# Patient Record
Sex: FEMALE | Race: OTHER | Employment: UNEMPLOYED | URBAN - METROPOLITAN AREA
[De-identification: names, ages, dates, MRNs, and addresses within clinical notes are randomized per-mention and may not be internally consistent; named-entity substitution may affect disease eponyms.]

---

## 2019-06-13 ENCOUNTER — OFFICE VISIT (OUTPATIENT)
Dept: FAMILY MEDICINE CLINIC | Age: 59
End: 2019-06-13

## 2019-06-13 VITALS
BODY MASS INDEX: 25.1 KG/M2 | HEIGHT: 64 IN | RESPIRATION RATE: 18 BRPM | TEMPERATURE: 98.8 F | SYSTOLIC BLOOD PRESSURE: 114 MMHG | HEART RATE: 74 BPM | WEIGHT: 147 LBS | DIASTOLIC BLOOD PRESSURE: 76 MMHG

## 2019-06-13 DIAGNOSIS — I10 ESSENTIAL HYPERTENSION: ICD-10-CM

## 2019-06-13 DIAGNOSIS — R05.9 COUGH: Primary | ICD-10-CM

## 2019-06-13 RX ORDER — VALSARTAN 80 MG/1
80 TABLET ORAL
COMMUNITY
Start: 2019-05-22 | End: 2019-06-27 | Stop reason: ALTCHOICE

## 2019-06-13 RX ORDER — LORATADINE 10 MG/1
10 TABLET ORAL DAILY
COMMUNITY
End: 2019-06-27

## 2019-06-13 RX ORDER — BENZONATATE 200 MG/1
200 CAPSULE ORAL
Qty: 30 CAP | Refills: 0 | Status: SHIPPED | OUTPATIENT
Start: 2019-06-13 | End: 2019-06-27

## 2019-06-13 NOTE — PROGRESS NOTES
Chief Complaint   Patient presents with    Establish Care    Cough     at night x 4 days      1. Have you been to the ER, urgent care clinic since your last visit? Hospitalized since your last visit? No     2. Have you seen or consulted any other health care providers outside of the [address] [place] [address] since your last visit? Include any pap smears or colon screening.  No

## 2019-06-13 NOTE — PROGRESS NOTES
HISTORY OF PRESENT ILLNESS  Marcio Cramer is a 60 y.o. female. Marcio Cramer is here to establish care. She is concerned about a nightly cough for the past 4 days. It is getting worse. The cough is nonproductive and she has no other symptoms other than occasional itching in the back of her throat. \"It is just dry. \"  She has not had this problem before. Nothing in particular makes it better or worse. It is keeping her up. Review of Systems   Constitutional: Negative for chills, fever and malaise/fatigue. HENT: Negative for congestion, ear pain and sore throat. Eyes: Negative for discharge and redness. Respiratory: Negative for cough, sputum production, shortness of breath and wheezing. Cardiovascular: Negative for chest pain. Neurological: Negative for headaches. Visit Vitals  /76   Pulse 74   Temp 98.8 °F (37.1 °C) (Oral)   Resp 18   Ht 5' 4.17\" (1.63 m)   Wt 147 lb (66.7 kg)   BMI 25.10 kg/m²     Physical Exam   Constitutional: She is oriented to person, place, and time. She appears well-developed and well-nourished. No distress. HENT:   Head: Normocephalic. Right Ear: Tympanic membrane, external ear and ear canal normal.   Left Ear: Tympanic membrane, external ear and ear canal normal.   Nose: Nose normal. Right sinus exhibits no maxillary sinus tenderness and no frontal sinus tenderness. Left sinus exhibits no maxillary sinus tenderness and no frontal sinus tenderness. Mouth/Throat: Uvula is midline, oropharynx is clear and moist and mucous membranes are normal.   Eyes: Right eye exhibits no discharge. Left eye exhibits no discharge. Right conjunctiva is not injected. Left conjunctiva is not injected. Cardiovascular: Normal rate, regular rhythm and normal heart sounds. Exam reveals no gallop and no friction rub. No murmur heard. Pulmonary/Chest: Effort normal and breath sounds normal. No respiratory distress. She has no wheezes. She has no rales.    Lymphadenopathy:     She has no cervical adenopathy. Neurological: She is alert and oriented to person, place, and time. Skin: Skin is warm and dry. She is not diaphoretic. Nursing note and vitals reviewed. ASSESSMENT and PLAN    ICD-10-CM ICD-9-CM    1. Cough R05 786.2 DISCONTINUED: loratadine (CLARITIN) 10 mg tablet      DISCONTINUED: benzonatate (TESSALON) 200 mg capsule   2. Essential hypertension J32 409.0 METABOLIC PANEL, BASIC      DISCONTINUED: benzonatate (TESSALON) 200 mg capsule        Cough, possibly allergy related  Blood pressure controlled  Claritin  Tessalon prn    Follow-up and Dispositions    · Return in about 6 months (around 12/13/2019) for blood pressure, if cough not better in 3-4 weeks. Reviewed plan of care. Patient has provided input and agrees with goals.

## 2019-06-14 LAB
BUN SERPL-MCNC: 19 MG/DL (ref 6–24)
BUN/CREAT SERPL: 29 (ref 9–23)
CALCIUM SERPL-MCNC: 9.3 MG/DL (ref 8.7–10.2)
CHLORIDE SERPL-SCNC: 103 MMOL/L (ref 96–106)
CO2 SERPL-SCNC: 24 MMOL/L (ref 20–29)
CREAT SERPL-MCNC: 0.65 MG/DL (ref 0.57–1)
GLUCOSE SERPL-MCNC: 97 MG/DL (ref 65–99)
POTASSIUM SERPL-SCNC: 4 MMOL/L (ref 3.5–5.2)
SODIUM SERPL-SCNC: 142 MMOL/L (ref 134–144)

## 2019-06-21 ENCOUNTER — OFFICE VISIT (OUTPATIENT)
Dept: FAMILY MEDICINE CLINIC | Age: 59
End: 2019-06-21

## 2019-06-21 VITALS
HEART RATE: 82 BPM | DIASTOLIC BLOOD PRESSURE: 70 MMHG | BODY MASS INDEX: 24.89 KG/M2 | TEMPERATURE: 98.7 F | RESPIRATION RATE: 16 BRPM | SYSTOLIC BLOOD PRESSURE: 107 MMHG | OXYGEN SATURATION: 100 % | WEIGHT: 145.8 LBS | HEIGHT: 64 IN

## 2019-06-21 DIAGNOSIS — M25.561 RIGHT KNEE PAIN, UNSPECIFIED CHRONICITY: ICD-10-CM

## 2019-06-21 DIAGNOSIS — M25.571 RIGHT ANKLE PAIN, UNSPECIFIED CHRONICITY: ICD-10-CM

## 2019-06-21 DIAGNOSIS — J01.90 ACUTE NON-RECURRENT SINUSITIS, UNSPECIFIED LOCATION: Primary | ICD-10-CM

## 2019-06-21 RX ORDER — AMOXICILLIN AND CLAVULANATE POTASSIUM 875; 125 MG/1; MG/1
1 TABLET, FILM COATED ORAL 2 TIMES DAILY
Qty: 20 TAB | Refills: 0 | Status: SHIPPED | OUTPATIENT
Start: 2019-06-21 | End: 2019-07-01

## 2019-06-21 NOTE — PATIENT INSTRUCTIONS
Although symptoms may be viral at this point it seems reasonable from a time course perspective and also from the patient's symptomatology to treat as though this is a sinus infection. Medications prescribed, discussed risks, benefits, alternatives and judicious use of antibiotic medications  Supportive care at home discussed as well  Patient has ongoing physical therapy needs and she cannot afford to continue to be seen at [address] [place] [address] for her physical therapy because of insurance coverage.   I will make a referral to physical therapy to be seen at a [facility] facility hopefully this will be more appropriate from an insurance coverage standpoint and she will be able to afford to continue with the therapies that she needs for her recovery in the postsurgical.  Call with questions or concerns

## 2019-06-21 NOTE — PROGRESS NOTES
Family Medicine Acute Visit Progress Note  Patient: Selina Roberts  1960, 60 y.o., female  Encounter Date: 6/21/2019    ASSESSMENT & PLAN    ICD-10-CM ICD-9-CM    1. Acute non-recurrent sinusitis, unspecified location J01.90 461.9    2. Right ankle pain, unspecified chronicity M25.571 719.47 REFERRAL TO PHYSICAL THERAPY   3. Right knee pain, unspecified chronicity M25.561 719.46 REFERRAL TO PHYSICAL THERAPY       Orders Placed This Encounter   [address] [facility] [place]     Referral Priority:   Routine     Referral Type:   PT/OT/ST     Referral Reason:   Specialty Services Required     Requested Specialty:   Physical Therapy     Number of Visits Requested:   1    amoxicillin-clavulanate (AUGMENTIN) 875-125 mg per tablet     Sig: Take 1 Tab by mouth two (2) times a day for 10 days. WITH FOOD     Dispense:  20 Tab     Refill:  0       Patient Instructions   Although symptoms may be viral at this point it seems reasonable from a time course perspective and also from the patient's symptomatology to treat as though this is a sinus infection. Medications prescribed, discussed risks, benefits, alternatives and judicious use of antibiotic medications  Supportive care at home discussed as well  Patient has ongoing physical therapy needs and she cannot afford to continue to be seen at [address] [place] [address] for her physical therapy because of insurance coverage. I will make a referral to physical therapy to be seen at a [facility] facility hopefully this will be more appropriate from an insurance coverage standpoint and she will be able to afford to continue with the therapies that she needs for her recovery in the postsurgical.  Call with questions or concerns      [address] [facility]  Chief Complaint   Patient presents with    Cold Symptoms     x 2 weeks    Cough     Yellow phlem started today.        Reggie De Leon is a 60 y.o. female presenting today for more than 2 weeks of symptoms of cough, ear fullness, facial fullness, purulent drainage, sinus congestion and pain. She reports she was seen a few weeks ago, her cough was dry and the symptoms were just beginning to start and she was given benzonatate. This has not seemed to help and symptoms have progressively worsened over these last 2 weeks. She denies any fevers at home. She is tried some over-the-counter medications, increasing her fluid consumption and increasing her rest but has not had significant improvement. Also she mentions that she has been following with [address] [place] [address] for post surgical care for her right knee and ankle. She unfortunately does not have insurance coverage for the physical therapy done at [address] [place] [address] and recently received quite a large bill from there. She wonders if I could make a referral for her to be seen for physical therapy had a [facility] location so that she might have insurance cover these treatments. [facility] notes reviewed in chart    Review of Systems  A 12 point review of systems was negative except as noted here or in the HPI. OBJECTIVE  Visit Vitals  /70 (BP 1 Location: Left arm, BP Patient Position: Sitting)   Pulse 82   Temp 98.7 °F (37.1 °C) (Oral)   Resp 16   Ht 5' 4.17\" (1.63 m)   Wt 145 lb 12.8 oz (66.1 kg)   SpO2 100%   BMI 24.89 kg/m²       Physical Exam   Constitutional: She is oriented to person, place, and time. She appears well-developed and well-nourished. No distress. Nad, appears stated age, non toxic   HENT:   Head: Normocephalic and atraumatic. Throat clear but erythematous, no exudates or concretions, nares boggy and edematous, tenderness to palpation of frontal sinuses, bilateral erythema of TM with moderate bulging   Eyes: Pupils are equal, round, and reactive to light. Conjunctivae and EOM are normal. Right eye exhibits no discharge. Left eye exhibits no discharge. No scleral icterus. Neck: Normal range of motion. Neck supple.    Cardiovascular: Normal rate, regular rhythm and normal heart sounds. Exam reveals no gallop and no friction rub. No murmur heard. Pulmonary/Chest: Effort normal and breath sounds normal. No stridor. No respiratory distress. She has no wheezes. She has no rales. Abdominal: Soft. Bowel sounds are normal. She exhibits no distension. There is no tenderness. Musculoskeletal: She exhibits no edema. postsurgical scars on right knee and ankle noted   Lymphadenopathy:     She has cervical adenopathy. Neurological: She is alert and oriented to person, place, and time. Skin: Skin is warm and dry. No rash noted. She is not diaphoretic. Psychiatric: She has a normal mood and affect. Her behavior is normal.   Nursing note and vitals reviewed. No results found for any visits on 06/21/19. HISTORICAL  PMH, PSH, FHX, SOCHX, ALLERGIES and MES were reviewed and updated today. Addis Mcdermott MD  [facility]  06/21/19 11:19 AM    Portions of this note may have been populated using smart dictation software and may have \"sounds-like\" errors present. Pt was counseled on risks, benefits and alternatives of treatment options. All questions were asked and answered and the patient was agreeable with the treatment plan as outlined.

## 2019-06-21 NOTE — PROGRESS NOTES
Chief Complaint   Patient presents with    Cold Symptoms     x 2 weeks    Cough     Yellow phlem started today. 1. Have you been to the ER, urgent care clinic since your last visit? Hospitalized since your last visit? No    2. Have you seen or consulted any other health care providers outside of the [address] [place] [address] since your last visit? Include any pap smears or colon screening.  No

## 2019-06-27 ENCOUNTER — OFFICE VISIT (OUTPATIENT)
Dept: FAMILY MEDICINE CLINIC | Age: 59
End: 2019-06-27

## 2019-06-27 ENCOUNTER — HOSPITAL ENCOUNTER (OUTPATIENT)
Dept: GENERAL RADIOLOGY | Age: 59
Discharge: HOME | End: 2019-06-27
Payer: COMMERCIAL

## 2019-06-27 VITALS
SYSTOLIC BLOOD PRESSURE: 123 MMHG | BODY MASS INDEX: 25.1 KG/M2 | HEART RATE: 70 BPM | DIASTOLIC BLOOD PRESSURE: 74 MMHG | HEIGHT: 64 IN | WEIGHT: 147 LBS | RESPIRATION RATE: 18 BRPM | TEMPERATURE: 98.4 F

## 2019-06-27 DIAGNOSIS — N63.20 LEFT BREAST MASS: ICD-10-CM

## 2019-06-27 DIAGNOSIS — I10 ESSENTIAL HYPERTENSION: ICD-10-CM

## 2019-06-27 DIAGNOSIS — R05.9 COUGH: Primary | ICD-10-CM

## 2019-06-27 DIAGNOSIS — R05.9 COUGH: ICD-10-CM

## 2019-06-27 PROCEDURE — 71046 X-RAY EXAM CHEST 2 VIEWS: CPT

## 2019-06-27 RX ORDER — AMLODIPINE BESYLATE 5 MG/1
5 TABLET ORAL DAILY
Qty: 30 TAB | Refills: 1 | Status: SHIPPED | OUTPATIENT
Start: 2019-06-27 | End: 2019-07-18 | Stop reason: SDUPTHER

## 2019-06-27 NOTE — PROGRESS NOTES
Chief Complaint   Patient presents with    Breast Mass     next to left nipple   Pt states she is still coughing. 1. Have you been to the ER, urgent care clinic since your last visit? Hospitalized since your last visit? No     2. Have you seen or consulted any other health care providers outside of the [address] [place] [address] since your last visit? Include any pap smears or colon screening.  No

## 2019-06-27 NOTE — PROGRESS NOTES
HISTORY OF PRESENT ILLNESS  Patrick Jones is a 60 y.o. female. Patrick Jones is here due to a lump in her left breast 2 days ago. Also, she has black spots around the nipple. It is not painful. Her last mammogram was 4 years ago. Also, I saw her for a cough, thought to be allergy related, on the 13th. It is still present and is unchanged. No relief with Tessalon and Claritin. Nothing makes it better or worse. Currently, she is taking Valsartan for her HTN. Review of Systems   HENT: Negative for congestion. No postnasal drainage. Occasional throat itching. Eyes: Negative for discharge and redness. Respiratory: Positive for cough. Negative for shortness of breath and wheezing. Genitourinary:        Left breast mass       Visit Vitals  /74   Pulse 70   Temp 98.4 °F (36.9 °C) (Oral)   Resp 18   Ht 5' 4.17\" (1.63 m)   Wt 147 lb (66.7 kg)   BMI 25.10 kg/m²     Physical Exam   Constitutional: She is oriented to person, place, and time. She appears well-developed and well-nourished. No distress. HENT:   Head: Normocephalic. Right Ear: Tympanic membrane, external ear and ear canal normal.   Left Ear: Tympanic membrane, external ear and ear canal normal.   Nose: Nose normal. Right sinus exhibits no maxillary sinus tenderness and no frontal sinus tenderness. Left sinus exhibits no maxillary sinus tenderness and no frontal sinus tenderness. Mouth/Throat: Uvula is midline, oropharynx is clear and moist and mucous membranes are normal.   Eyes: Right eye exhibits no discharge. Left eye exhibits no discharge. Right conjunctiva is not injected. Left conjunctiva is not injected. Cardiovascular: Normal rate, regular rhythm and normal heart sounds. Exam reveals no gallop and no friction rub. No murmur heard. Pulmonary/Chest: Effort normal and breath sounds normal. No respiratory distress. She has no wheezes. She has no rales. No breast swelling, tenderness or discharge.        Lymphadenopathy: She has no cervical adenopathy. Neurological: She is alert and oriented to person, place, and time. Skin: Skin is warm and dry. She is not diaphoretic. No breast skin or nipple abnormailities   Nursing note and vitals reviewed. ASSESSMENT and PLAN    ICD-10-CM ICD-9-CM    1. Cough R05 786.2 XR CHEST PA LAT   2. Left breast mass N63.20 611.72 XR CHEST PA LAT      ANALI MAMMOGRAM DIAG BILAT SAME DAY INCL CAD      REFERRAL TO BREAST SURGERY   3. Essential hypertension I10 401.9 amLODIPine (NORVASC) 5 mg tablet        Cough may be related to her valsartan use  Diagnostic mammogram today  Referral to breast surgery  chest X-ray  Stop valsartan  Start Norvasc    Follow-up and Dispositions    · Return in about 1 month (around 7/27/2019) for blood pressure. Reviewed plan of care. Patient has provided input and agrees with goals.

## 2019-07-07 ENCOUNTER — TELEPHONE (OUTPATIENT)
Dept: FAMILY MEDICINE CLINIC | Age: 59
End: 2019-07-07

## 2019-07-08 ENCOUNTER — HOSPITAL ENCOUNTER (OUTPATIENT)
Dept: PHYSICAL THERAPY | Age: 59
Discharge: HOME | End: 2019-07-08
Payer: COMMERCIAL

## 2019-07-08 PROCEDURE — 97161 PT EVAL LOW COMPLEX 20 MIN: CPT | Performed by: PHYSICAL THERAPIST

## 2019-07-08 PROCEDURE — 97110 THERAPEUTIC EXERCISES: CPT | Performed by: PHYSICAL THERAPIST

## 2019-07-08 NOTE — PROGRESS NOTES
[facility]  [facility]  [facility]  Phone: [phone]  Fax: [phone]    Plan of Care/ Statement of Necessity for Physical Therapy Services 2-15    Patient name: Vern Bernardo  : 1960  Provider#: [id number]  Referral source: Nina Odom MD      Medical/Treatment Diagnosis: Right knee pain [M25.561]  Right ankle pain [M25.571]     Prior Hospitalization: see medical history     Comorbidities: None  Prior Level of Function: see initial eval  Medications: Verified on Patient Summary List    Start of Care: 18      Onset Date: R knee: 3/2016           R ankle: 2018       The Plan of Care and following information is based on the information from the initial evaluation. Assessment/ key information: Patient presents with R knee OA with a previous surgery several years ago as well as a fracture of the R ankle one year ago. She continues to have ROM and strength limitations at both joints that is limiting her ability to walk for prolonged periods. Evaluation Complexity History MEDIUM  Complexity : 1-2 comorbidities / personal factors will impact the outcome/ POC ; Examination MEDIUM Complexity : 3 Standardized tests and measures addressing body structure, function, activity limitation and / or participation in recreation  ;Presentation MEDIUM Complexity : Evolving with changing characteristics  ; Clinical Decision Making MEDIUM Complexity : FOTO score of 26-74  Overall Complexity Rating: MEDIUM    Problem List: pain affecting function, decrease ROM, decrease strength, impaired gait/ balance, decrease ADL/ functional abilitiies, decrease activity tolerance, decrease flexibility/ joint mobility and decrease transfer abilities   Treatment Plan may include any combination of the following: Therapeutic exercise, Therapeutic activities, Neuromuscular re-education, Physical agent/modality, Gait/balance training, Manual therapy, Patient education, Self Care training, Functional mobility training, Home safety training and Stair training  Patient / Family readiness to learn indicated by: asking questions, trying to perform skills and interest  Persons(s) to be included in education: patient (P)  Barriers to Learning/Limitations: None  Patient Goal (s): I want to be able to walk with less pain.   Patient Self Reported Health Status: excellent  Rehabilitation Potential: excellent    Short Term Goals: To be accomplished in 8 treatments:  1. Patient will be able to walk for 20 minutes with no pain or limitation. 2. Patient will be able to perform a sit-to-stand transfer with no pain or limitation. 3. Patient will be able to descend a curb with her R LE with no pain or limitation. Long Term Goals: To be accomplished in 16 treatments:  1. Patient will be able to walk for 30 minutes with no pain or limitation. 2. Patient will be able to squat through a full ROM with no pain or limitation. 3. Patient will be able to ambulate a flight of stairs with no pain or limitation. Frequency / Duration: Patient to be seen 2 times per week for 8 weeks. Patient/ Caregiver education and instruction: self care, activity modification and exercises    [x]  Plan of care has been reviewed with PTA    Eusebio Oliver, PT , DPT, OCS, Cert. DN   7/8/2019     ________________________________________________________________________    I certify that the above Therapy Services are being furnished while the patient is under my care. I agree with the treatment plan and certify that this therapy is necessary.     [id number] Signature:____________________  Date:____________Time: _________

## 2019-07-10 ENCOUNTER — HOSPITAL ENCOUNTER (OUTPATIENT)
Dept: MAMMOGRAPHY | Age: 59
Discharge: HOME | End: 2019-07-10
Attending: FAMILY MEDICINE
Payer: COMMERCIAL

## 2019-07-10 ENCOUNTER — OFFICE VISIT (OUTPATIENT)
Dept: SURGERY | Age: 59
End: 2019-07-10

## 2019-07-10 VITALS
WEIGHT: 147 LBS | SYSTOLIC BLOOD PRESSURE: 117 MMHG | HEIGHT: 64 IN | DIASTOLIC BLOOD PRESSURE: 64 MMHG | HEART RATE: 83 BPM | BODY MASS INDEX: 25.1 KG/M2

## 2019-07-10 DIAGNOSIS — N63.20 MASS OF LEFT BREAST: ICD-10-CM

## 2019-07-10 DIAGNOSIS — N60.12 FIBROCYSTIC BREAST, LEFT: Primary | ICD-10-CM

## 2019-07-10 PROCEDURE — 77066 DX MAMMO INCL CAD BI: CPT

## 2019-07-10 PROCEDURE — 76642 ULTRASOUND BREAST LIMITED: CPT

## 2019-07-10 NOTE — PROGRESS NOTES
HISTORY OF PRESENT ILLNESS Miesha Butt is a 60 y.o. female. HPI NEW patient referral for consultation by Dr. Philip Meyers for a palpable LEFT breast lump. The patient had breast imaging done today. She denies any nipple inversion or discharge. OB History None Obstetric Comments Menarche  LMP unsure, # of children 2, age of 1st delivery 31 Hysterectomy/oophorectomy no/no, Breast bx none  history of breast feeding no, BCP yes, Hormone therapy none ANALI Results (most recent): 
Results from Hospital Encounter encounter on 07/10/19 [facility] MAMMO BI DX INCL CAD Narrative STUDY:  Bilateral Diagnostic Digital Mammography with left breast ultrasound INDICATION: Palpable abnormality in the left breast 
 
COMPARISON:  New baseline study BREAST COMPOSITION: The breasts are extremely dense, which lowers the 
sensitivity of mammography. FINDINGS: Bilateral digital diagnostic mammography was performed, and is 
interpreted in conjunction with a computer assisted detection (CAD) system. Spot 
compression views were performed of the region of palpable concern, the 
periareolar left breast at approximately 2:00. There are no suspicious findings 
within either breast. There is no skin thickening or nipple retraction. Left breast ultrasound was performed of the region of palpable concern, the 
lateral aspect of the areola at approximately 2:00. There is a 4 mm Hardy 
gland cyst, which accounts for the region of palpable concern. There are no 
suspicious lesions. Impression IMPRESSION: 
 
1. BI-RADS Assessment Category 2: Benign finding. 4 mm Hardy gland cyst in 
the lateral aspect of the left areola, explaining the region of palpable 
concern. 2. No mammographic evidence of malignancy within either breast. 
3. Left breast ultrasound confirms these benign findings. Recommend annual screening mammography. The patient has been notified of these results and recommendations. No family history of breast or ovarian cancer Review of Systems Constitutional: Negative. HENT: Negative. Eyes: Negative. Respiratory: Negative. Cardiovascular: Negative. Gastrointestinal: Negative. Genitourinary: Negative. Musculoskeletal: Negative. Skin: Negative. Neurological: Negative. Endo/Heme/Allergies: Negative. Psychiatric/Behavioral: Negative. Physical Exam 
 
ASSESSMENT and PLAN 
{ASSESSMENT/PLAN:73700}

## 2019-07-10 NOTE — PROGRESS NOTES
HISTORY OF PRESENT ILLNESS  Zari Callaway is a 60 y.o. female. HPI  NEW patient referral for consultation by Dr. Yaakov Ceron for a palpable LEFT breast lump. The patient had breast imaging done today. She denies any nipple inversion or discharge. OB History    None       Obstetric Comments   Menarche  LMP unsure, # of children 2, age of 1st delivery 31 Hysterectomy/oophorectomy no/no, Breast bx none  history of breast feeding no, BCP yes, Hormone therapy none             [facility] Results (most recent):       Results from [place] encounter on 07/10/19   [facility] MAMMO BI DX INCL CAD     Narrative STUDY:  Bilateral Diagnostic Digital Mammography with left breast ultrasound     INDICATION: Palpable abnormality in the left breast     COMPARISON:  New baseline study     BREAST COMPOSITION: The breasts are extremely dense, which lowers the  sensitivity of mammography.     FINDINGS: Bilateral digital diagnostic mammography was performed, and is  interpreted in conjunction with a computer assisted detection (CAD) system. Spot  compression views were performed of the region of palpable concern, the  periareolar left breast at approximately 2:00. There are no suspicious findings  within either breast. There is no skin thickening or nipple retraction.     Left breast ultrasound was performed of the region of palpable concern, the  lateral aspect of the areola at approximately 2:00. There is a 4 mm Hardy  gland cyst, which accounts for the region of palpable concern. There are no  suspicious lesions.        Impression IMPRESSION:     1. BI-RADS Assessment Category 2: Benign finding. 4 mm Hardy gland cyst in  the lateral aspect of the left areola, explaining the region of palpable  concern.   2. No mammographic evidence of malignancy within either breast.  3. Left breast ultrasound confirms these benign findings.     Recommend annual screening mammography.     The patient has been notified of these results and recommendations. No family history of breast or ovarian cancer       Past Medical History:   Diagnosis Date    Arthritis     bilateral knees    Hypercholesterolemia     Hypertension        Past Surgical History:   Procedure Laterality Date    HX ANKLE FRACTURE TX Right     HX KNEE REPLACEMENT Right        Social History     Socioeconomic History    Marital status: SINGLE     Spouse name: Not on file    Number of children: Not on file    Years of education: Not on file    Highest education level: Not on file   Occupational History    Not on file   Social Needs    Financial resource strain: Not on file    Food insecurity:     Worry: Not on file     Inability: Not on file    Transportation needs:     Medical: Not on file     Non-medical: Not on file   Tobacco Use    Smoking status: Never Smoker    Smokeless tobacco: Never Used   Substance and Sexual Activity    Alcohol use: Never     Frequency: Never    Drug use: Never    Sexual activity: Yes     Birth control/protection: None   Lifestyle    Physical activity:     Days per week: Not on file     Minutes per session: Not on file    Stress: Not on file   Relationships    Social connections:     Talks on phone: Not on file     Gets together: Not on file     Attends [employer] service: Not on file     Active member of club or organization: Not on file     Attends meetings of clubs or organizations: Not on file     Relationship status: Not on file    Intimate partner violence:     Fear of current or ex partner: Not on file     Emotionally abused: Not on file     Physically abused: Not on file     Forced sexual activity: Not on file   Other Topics Concern    Not on file   Social History Narrative    Not on file       Current Outpatient Medications on File Prior to Visit   Medication Sig Dispense Refill    amLODIPine (NORVASC) 5 mg tablet Take 1 Tab by mouth daily. 30 Tab 1     No current facility-administered medications on file prior to visit. No Known Allergies    OB History    None      Obstetric Comments   Menarche  LMP unsure, # of children 2, age of 1st delivery 31 Hysterectomy/oophorectomy no/no, Breast bx none  history of breast feeding no, BCP yes, Hormone therapy none             ROS  Constitutional: Negative. HENT: Negative. Eyes: Negative. Respiratory: Negative. Cardiovascular: Negative. Gastrointestinal: Negative. Genitourinary: Negative. Musculoskeletal: Negative. Skin: Negative. Neurological: Negative. Endo/Heme/Allergies: Negative. Psychiatric/Behavioral: Negative. Physical Exam   Cardiovascular: Normal rate, regular rhythm and normal heart sounds. Pulmonary/Chest: Breath sounds normal. Right breast exhibits no inverted nipple, no mass, no nipple discharge, no skin change and no tenderness. Left breast exhibits no inverted nipple, no mass, no nipple discharge, no skin change and no tenderness. Breasts are symmetrical.       Lymphadenopathy:     She has no cervical adenopathy. Right cervical: No superficial cervical, no deep cervical and no posterior cervical adenopathy present. Left cervical: No superficial cervical, no deep cervical and no posterior cervical adenopathy present. She has no axillary adenopathy. Right axillary: No pectoral and no lateral adenopathy present. Left axillary: No pectoral and no lateral adenopathy present. ASSESSMENT and PLAN    ICD-10-CM ICD-9-CM    1. Fibrocystic breast, left N60.12 610.1       New patient presents for evaluation of LEFT breast mass, and is doing well overall. Palpable arthur gland at 4:00 retroareolar. Reviewed mammogram results. Pt to continue annual mammography. F/U PRN. This plan was reviewed with the patient and patient agrees. All questions were answered.     Written by Justin Montana, as dictated by Dr. Fabiana Coreas MD.

## 2019-07-15 ENCOUNTER — HOSPITAL ENCOUNTER (OUTPATIENT)
Dept: PHYSICAL THERAPY | Age: 59
Discharge: HOME | End: 2019-07-15
Payer: COMMERCIAL

## 2019-07-15 PROCEDURE — 97110 THERAPEUTIC EXERCISES: CPT | Performed by: PHYSICAL THERAPIST

## 2019-07-15 NOTE — PROGRESS NOTES
PT DAILY TREATMENT NOTE 2-15    Patient Name: Leah Monte  Date:7/15/2019  : 1960  [x]  Patient  Verified  Payor: [address] [place] [address] Road / Plan: [address] / Product Type: Managed Care Medicaid /    In time:7:30 AM  Out time:8:25 AM  Total Treatment Time (min): 55  Visit #:  2    Treatment Area: Right knee pain [M25.561]  Right ankle pain [M25.571]    SUBJECTIVE  Pain Level (0-10 scale): 0/10  Any medication changes, allergies to medications, adverse drug reactions, diagnosis change, or new procedure performed?: [x] No    [] Yes (see summary sheet for update)  Subjective functional status/changes:   [] No changes reported  Patient reports no significant pain at this moment and was able to perform her HEP this past weekend.     OBJECTIVE    Modality rationale: Patient declined   Min Type Additional Details       [] Estim: []Att   []Unatt    []TENS instruct                  []IFC  []Premod   []NMES                     []Other:  []w/US   []w/ice   []w/heat  Position:  Location:       []  Traction: [] Cervical       []Lumbar                       [] Prone          []Supine                       []Intermittent   []Continuous Lbs:  [] before manual  [] after manual  []w/heat    []  Ultrasound: []Continuous   [] Pulsed                       at: []1MHz   []3MHz Location:  W/cm2:    [] Paraffin         Location:   []w/heat    []  Ice     []  Heat  []  Ice massage Position:  Location:    []  Laser  []  Other: Position:  Location:      []  Vasopneumatic Device Pressure:       [] lo [] med [] hi   Temperature:      [x] Skin assessment post-treatment:  [x]intact []redness- no adverse reaction    []redness - adverse reaction:         55 min Therapeutic Exercise:  [x] See flow sheet :   Rationale: increase ROM, increase strength, improve coordination, improve balance and increase proprioception to improve the patients ability to walk without pain        With   [] TE   [] TA   [] neuro   [] other: Patient Education: [x] Review HEP    [] Progressed/Changed HEP based on:   [] positioning   [] body mechanics   [] transfers   [] heat/ice application    [] other:      Other Objective/Functional Measures:      Pain Level (0-10 scale) post treatment: 0    ASSESSMENT/Changes in Function:   Moderate verbal cueing required with most new exercises due to form concerns. Patient will continue to benefit from skilled PT services to modify and progress therapeutic interventions, address functional mobility deficits, address ROM deficits, address strength deficits, analyze and address soft tissue restrictions, analyze and cue movement patterns, analyze and modify body mechanics/ergonomics and assess and modify postural abnormalities to attain remaining goals. []  See Plan of Care  []  See progress note/recertification  []  See Discharge Summary         Progress towards goals / Updated goals:  Patient tolerated today's progression of therapeutic exercises well and is showing good improvement towards short term goals. PLAN  [x]  Upgrade activities as tolerated     [x]  Continue plan of care  []  Update interventions per flow sheet       []  Discharge due to:_  []  Other:_      Darrius Roque, PT , DPT, OCS, Cert.  DN   7/15/2019

## 2019-07-17 ENCOUNTER — HOSPITAL ENCOUNTER (OUTPATIENT)
Dept: PHYSICAL THERAPY | Age: 59
Discharge: HOME | End: 2019-07-17
Payer: COMMERCIAL

## 2019-07-17 PROCEDURE — 97110 THERAPEUTIC EXERCISES: CPT | Performed by: PHYSICAL THERAPIST

## 2019-07-17 NOTE — PROGRESS NOTES
PT DAILY TREATMENT NOTE 2-15    Patient Name: Lonnie Delgado  Date:2019  : 1960  [x]  Patient  Verified  Payor: [address] [place] [address] Road / Plan: [address] / Product Type: Managed Care Medicaid /    In time:7:40 AM  Out time:8:35 AM  Total Treatment Time (min): 55  Visit #:  3    Treatment Area: Right knee pain [M25.561]  Right ankle pain [M25.571]    SUBJECTIVE  Pain Level (0-10 scale): 0/10  Any medication changes, allergies to medications, adverse drug reactions, diagnosis change, or new procedure performed?: [x] No    [] Yes (see summary sheet for update)  Subjective functional status/changes:   [] No changes reported  Patient reports that her new is much stronger than before and she is very happy with her progress.     OBJECTIVE    Modality rationale: Patient declined   Min Type Additional Details       [] Estim: []Att   []Unatt    []TENS instruct                  []IFC  []Premod   []NMES                     []Other:  []w/US   []w/ice   []w/heat  Position:  Location:       []  Traction: [] Cervical       []Lumbar                       [] Prone          []Supine                       []Intermittent   []Continuous Lbs:  [] before manual  [] after manual  []w/heat    []  Ultrasound: []Continuous   [] Pulsed                       at: []1MHz   []3MHz Location:  W/cm2:    [] Paraffin         Location:   []w/heat    []  Ice     []  Heat  []  Ice massage Position:  Location:    []  Laser  []  Other: Position:  Location:      []  Vasopneumatic Device Pressure:       [] lo [] med [] hi   Temperature:      [x] Skin assessment post-treatment:  [x]intact []redness- no adverse reaction    []redness - adverse reaction:         55 min Therapeutic Exercise:  [x] See flow sheet :   Rationale: increase ROM, increase strength, improve coordination, improve balance and increase proprioception to improve the patients ability to walk without pain        With   [] TE   [] TA   [] neuro   [] other: Patient Education: [x] Review HEP    [] Progressed/Changed HEP based on:   [] positioning   [] body mechanics   [] transfers   [] heat/ice application    [] other:      Other Objective/Functional Measures:    Patient reported an increase in knee pain with goblet squats. Pain Level (0-10 scale) post treatment: 0    ASSESSMENT/Changes in Function:   Min-to-mod verbal cueing required with most new exercises due to form concerns. Patient will continue to benefit from skilled PT services to modify and progress therapeutic interventions, address functional mobility deficits, address ROM deficits, address strength deficits, analyze and address soft tissue restrictions, analyze and cue movement patterns, analyze and modify body mechanics/ergonomics and assess and modify postural abnormalities to attain remaining goals. []  See Plan of Care  []  See progress note/recertification  []  See Discharge Summary         Progress towards goals / Updated goals:  Patient is showing improved form with squatting and is tolerating a steady progression of therapeutic exercises. PLAN  [x]  Upgrade activities as tolerated     [x]  Continue plan of care  []  Update interventions per flow sheet       []  Discharge due to:_  []  Other:_      Eusebio Oliver, PT , DPT, OCS, Cert.  DN   7/17/2019

## 2019-07-18 ENCOUNTER — OFFICE VISIT (OUTPATIENT)
Dept: FAMILY MEDICINE CLINIC | Age: 59
End: 2019-07-18

## 2019-07-18 VITALS
TEMPERATURE: 98 F | SYSTOLIC BLOOD PRESSURE: 113 MMHG | DIASTOLIC BLOOD PRESSURE: 69 MMHG | WEIGHT: 149 LBS | BODY MASS INDEX: 25.44 KG/M2 | HEIGHT: 64 IN | OXYGEN SATURATION: 99 % | RESPIRATION RATE: 16 BRPM | HEART RATE: 78 BPM

## 2019-07-18 DIAGNOSIS — J98.01 COUGH DUE TO BRONCHOSPASM: Primary | ICD-10-CM

## 2019-07-18 DIAGNOSIS — I10 ESSENTIAL HYPERTENSION: ICD-10-CM

## 2019-07-18 RX ORDER — ALBUTEROL SULFATE 90 UG/1
1 AEROSOL, METERED RESPIRATORY (INHALATION)
Qty: 1 INHALER | Refills: 0 | Status: SHIPPED | OUTPATIENT
Start: 2019-07-18 | End: 2019-10-31

## 2019-07-18 RX ORDER — PREDNISONE 20 MG/1
20 TABLET ORAL
Qty: 5 TAB | Refills: 0 | Status: SHIPPED | OUTPATIENT
Start: 2019-07-18 | End: 2019-07-23

## 2019-07-18 RX ORDER — AMLODIPINE BESYLATE 5 MG/1
5 TABLET ORAL DAILY
Qty: 90 TAB | Refills: 1 | Status: SHIPPED | OUTPATIENT
Start: 2019-07-18 | End: 2019-08-26 | Stop reason: SDUPTHER

## 2019-07-18 NOTE — PROGRESS NOTES
Family Medicine Acute Visit Progress Note  Patient: Angel Meredith  1960, 60 y.o., female  Encounter Date: 7/18/2019    ASSESSMENT & PLAN    ICD-10-CM ICD-9-CM    1. Cough due to bronchospasm J98.01 519.11    2. Essential hypertension I10 401.9 amLODIPine (NORVASC) 5 mg tablet       Orders Placed This Encounter    predniSONE (DELTASONE) 20 mg tablet     Sig: Take 20 mg by mouth daily (with breakfast) for 5 days. Dispense:  5 Tab     Refill:  0    albuterol (PROVENTIL HFA, VENTOLIN HFA, PROAIR HFA) 90 mcg/actuation inhaler     Sig: Take 1 Puff by inhalation every four (4) hours as needed for Wheezing. Dispense:  1 Inhaler     Refill:  0    amLODIPine (NORVASC) 5 mg tablet     Sig: Take 1 Tab by mouth daily. Dispense:  90 Tab     Refill:  1     On behalf of Dr John Contreras       Patient Instructions   BP adequately controlled on new medication  Cough may be due to bronchospasm given the patient's descriptors, and may also be due post viral cough syndrome, I will trial steroids with an albuterol inhaler, if not sufficient it may be that the patient needs to wait longer to see if this resolves as post viral cough can sometimes last up to 12 weeks. She understands this  She will continue take all medications as prescribed, drink plenty of water, use humidified air if needed. Okay to use the traditional [facility] medicines that she discussed with me today      CHIEF COMPLAINT  Chief Complaint   Patient presents with    Cough     x 1 month with brown phlem.        Pa Duncan is a 60 y.o. female presenting today for 1 month of cough, seems to be getting worse again, she reports she now has a headache along with oit  Cough, dry, not productive  It comes out of the blue  Tried switching bp med from losartan to amlodipine  Treated sinus infection with antibiotics  Had negative chest xray  No systemic signs of infection  Feels chest is tight  Review of Systems  A 12 point review of systems was negative except as noted here or in the HPI. OBJECTIVE  Visit Vitals  /69 (BP 1 Location: Left arm, BP Patient Position: Sitting)   Pulse 78   Temp 98 °F (36.7 °C) (Oral)   Resp 16   Ht 5' 4\" (1.626 m)   Wt 149 lb (67.6 kg)   SpO2 99%   BMI 25.58 kg/m²       Physical Exam   Constitutional: She is oriented to person, place, and time. She appears well-developed and well-nourished. No distress. NAD, Nontoxic, Appears Stated Age   HENT:   Head: Normocephalic and atraumatic. Mouth/Throat: Oropharynx is clear and moist.   Mild erythema of tonsillar pillars , no exudates, mild posterior cobblestoning   Eyes: Conjunctivae and EOM are normal. Right eye exhibits no discharge. Left eye exhibits no discharge. No scleral icterus. Neck: Neck supple. Cardiovascular: Normal rate, regular rhythm and normal heart sounds. No murmur heard. Pulmonary/Chest: Effort normal and breath sounds normal. No stridor. No respiratory distress. She has no rales. Faint exp wheezing scattered   Abdominal: Soft. Bowel sounds are normal. She exhibits no distension. There is no tenderness. Musculoskeletal: She exhibits no edema or tenderness. Neurological: She is alert and oriented to person, place, and time. Grossly intact CN   Skin: Skin is warm and dry. No rash noted. She is not diaphoretic. Psychiatric: She has a normal mood and affect. Her behavior is normal.   Nursing note and vitals reviewed. No results found for any visits on 07/18/19. HISTORICAL  PMH, PSH, FHX, SOCHX, ALLERGIES and MES were reviewed and updated today. Mendel Suggs MD  [facility]  07/18/19 11:59 AM    Portions of this note may have been populated using smart dictation software and may have \"sounds-like\" errors present. Pt was counseled on risks, benefits and alternatives of treatment options. All questions were asked and answered and the patient was agreeable with the treatment plan as outlined.

## 2019-07-18 NOTE — PROGRESS NOTES
Chief Complaint   Patient presents with    Cough     x 1 month with brown phlem. 1. Have you been to the ER, urgent care clinic since your last visit? Hospitalized since your last visit? No    2. Have you seen or consulted any other health care providers outside of the [address] [place] [address] since your last visit? Include any pap smears or colon screening.  No

## 2019-07-18 NOTE — PATIENT INSTRUCTIONS
BP adequately controlled on new medication  Cough may be due to bronchospasm given the patient's descriptors, and may also be due post viral cough syndrome, I will trial steroids with an albuterol inhaler, if not sufficient it may be that the patient needs to wait longer to see if this resolves as post viral cough can sometimes last up to 12 weeks. She understands this  She will continue take all medications as prescribed, drink plenty of water, use humidified air if needed.   Okay to use the traditional [facility] medicines that she discussed with me today

## 2019-07-22 ENCOUNTER — HOSPITAL ENCOUNTER (OUTPATIENT)
Dept: PHYSICAL THERAPY | Age: 59
Discharge: HOME | End: 2019-07-22
Payer: COMMERCIAL

## 2019-07-22 PROCEDURE — 97110 THERAPEUTIC EXERCISES: CPT

## 2019-07-22 NOTE — PROGRESS NOTES
PT DAILY TREATMENT NOTE 2-15    Patient Name: Ethan Chavez  Date:2019  : 1960  [x]  Patient  Verified  Payor: [address] [place] [address] Road / Plan: [address] / Product Type: Managed Care Medicaid /    In time:7:25a  Out time:8:10a  Total Treatment Time (min): 45  Visit #:  4    Treatment Area: Right knee pain [M25.561]  Right ankle pain [M25.571]    SUBJECTIVE  Pain Level (0-10 scale): 1/10- right ankle  Any medication changes, allergies to medications, adverse drug reactions, diagnosis change, or new procedure performed?: [x] No    [] Yes (see summary sheet for update)  Subjective functional status/changes:   [] No changes reported  Patient reports she woke with some medial ankle pain and is not sure why. Patient reports no issues with HEP.     OBJECTIVE    Modality rationale: Patient declined   Min Type Additional Details       [] Estim: []Att   []Unatt    []TENS instruct                  []IFC  []Premod   []NMES                     []Other:  []w/US   []w/ice   []w/heat  Position:  Location:       []  Traction: [] Cervical       []Lumbar                       [] Prone          []Supine                       []Intermittent   []Continuous Lbs:  [] before manual  [] after manual  []w/heat    []  Ultrasound: []Continuous   [] Pulsed                       at: []1MHz   []3MHz Location:  W/cm2:    [] Paraffin         Location:   []w/heat    []  Ice     []  Heat  []  Ice massage Position:  Location:    []  Laser  []  Other: Position:  Location:      []  Vasopneumatic Device Pressure:       [] lo [] med [] hi   Temperature:      [x] Skin assessment post-treatment:  [x]intact []redness- no adverse reaction    []redness - adverse reaction:         45 min Therapeutic Exercise:  [x] See flow sheet :   Rationale: increase ROM, increase strength, improve coordination, improve balance and increase proprioception to improve the patients ability to walk without pain          With   [] TE   [] TA   [] neuro   [] other: Patient Education: [x] Review HEP    [] Progressed/Changed HEP based on:   [] positioning   [] body mechanics   [] transfers   [] heat/ice application    [] other:      Other Objective/Functional Measures:    Patient reported an increase in knee pain with squats, decreased knee pain with form correction    Pain Level (0-10 scale) post treatment: 0    ASSESSMENT/Changes in Function:   Min-to-mod verbal cueing required with several exercises due to form concerns and speed of exercises. Patient will continue to benefit from skilled PT services to modify and progress therapeutic interventions, address functional mobility deficits, address ROM deficits, address strength deficits, analyze and address soft tissue restrictions, analyze and cue movement patterns, analyze and modify body mechanics/ergonomics and assess and modify postural abnormalities to attain remaining goals. []  See Plan of Care  []  See progress note/recertification  []  See Discharge Summary         Progress towards goals / Updated goals:  Patient will do well with continued progression at next session to continue to improve balance and strength. Patient making good progress with improved mechanics for squats and overall tolerance for exercises.      PLAN  [x]  Upgrade activities as tolerated     [x]  Continue plan of care  []  Update interventions per flow sheet       []  Discharge due to:_  []  Other:_      Vanetta Mcardle , PTA  7/22/2019

## 2019-07-23 ENCOUNTER — OFFICE VISIT (OUTPATIENT)
Dept: FAMILY MEDICINE CLINIC | Age: 59
End: 2019-07-23

## 2019-07-23 VITALS
BODY MASS INDEX: 25.44 KG/M2 | WEIGHT: 149 LBS | DIASTOLIC BLOOD PRESSURE: 69 MMHG | HEART RATE: 79 BPM | TEMPERATURE: 98.4 F | SYSTOLIC BLOOD PRESSURE: 110 MMHG | HEIGHT: 64 IN | RESPIRATION RATE: 18 BRPM

## 2019-07-23 DIAGNOSIS — R05.3 CHRONIC COUGH: Primary | ICD-10-CM

## 2019-07-23 DIAGNOSIS — Z11.1 TUBERCULOSIS SCREENING: ICD-10-CM

## 2019-07-23 DIAGNOSIS — Q24.9 CONGENITAL HEART DEFECT: ICD-10-CM

## 2019-07-23 RX ORDER — BENZONATATE 200 MG/1
200 CAPSULE ORAL
Qty: 30 CAP | Refills: 0 | Status: SHIPPED | OUTPATIENT
Start: 2019-07-23 | End: 2019-09-04

## 2019-07-23 RX ORDER — OMEPRAZOLE 40 MG/1
40 CAPSULE, DELAYED RELEASE ORAL DAILY
Qty: 30 CAP | Refills: 1 | Status: SHIPPED | OUTPATIENT
Start: 2019-07-23 | End: 2019-09-04

## 2019-07-23 NOTE — PROGRESS NOTES
HISTORY OF PRESENT ILLNESS  Tigre Payne is a 60 y.o. female. Tigre Payne is here for follow up on her chronic, nonproductive cough, which started in mid-June, and her HTN. I last saw her 6/26/19 and switched her from valsartan to Norvasc. Her chest X-ray, done around that time, was normal.  The cough is worse. Her HTN is a chronic problem. The current episode started more than 1 week ago. The problem occurs constantly and is stable. Pertinent negatives include no chest pain, no abdominal pain, and no shortness of breath. Nothing aggravates the symptoms. The symptoms are relieved by Norvasc, which is working well. She mentions that she is being seen in [facility] for a congenital heart defect. Review of Systems   Constitutional: Negative for weight loss. No weight gain   Eyes: Negative for blurred vision. Respiratory: Positive for sputum production and wheezing. Negative for cough and shortness of breath. Her sputum is white   Cardiovascular: Negative for chest pain and leg swelling. Gastrointestinal: Positive for heartburn. Negative for abdominal pain, nausea and vomiting. Neurological: Positive for headaches. Negative for dizziness, sensory change, speech change and focal weakness. Occasional headache       Visit Vitals  /69   Pulse 79   Temp 98.4 °F (36.9 °C) (Oral)   Resp 18   Ht 5' 4\" (1.626 m)   Wt 149 lb (67.6 kg)   BMI 25.58 kg/m²     BP Readings from Last 3 Encounters:   07/18/19 113/69   07/10/19 117/64   06/27/19 123/74     Physical Exam   Constitutional: She is oriented to person, place, and time. She appears well-developed and well-nourished. No distress. Cardiovascular: Normal rate, regular rhythm and normal heart sounds. Exam reveals no gallop and no friction rub. No murmur heard. Pulmonary/Chest: Effort normal and breath sounds normal. No respiratory distress. She has no wheezes. She has no rales. Abdominal: Soft.  Normal appearance and bowel sounds are normal. She exhibits no distension. There is no hepatosplenomegaly. There is no tenderness. There is no rebound and no guarding. Musculoskeletal: She exhibits no edema. Neurological: She is alert and oriented to person, place, and time. Skin: Skin is warm and dry. She is not diaphoretic. Nursing note and vitals reviewed. ASSESSMENT and PLAN    ICD-10-CM ICD-9-CM    1. Chronic cough R05 786.2 PULMONARY FUNCTION TEST      omeprazole (PRILOSEC) 40 mg capsule      benzonatate (TESSALON) 200 mg capsule      AMB POC TUBERCULOSIS, INTRADERMAL (SKIN TEST)   2. Congenital heart defect Q24.9 746.9 ECHO ADULT COMPLETE   3. Tuberculosis screening Z11.1 V74.1 AMB POC TUBERCULOSIS, INTRADERMAL (SKIN TEST)        GI vs Pulmonary vs Cardiac  Start Prilosec  Tessalon prn  pulmonary function tests  echocardiogram  PPD    Follow-up and Dispositions    · Return in about 6 weeks (around 9/3/2019) for chronic cough/2 days for PPD reading. Reviewed plan of care. Patient has provided input and agrees with goals.

## 2019-07-25 ENCOUNTER — HOSPITAL ENCOUNTER (OUTPATIENT)
Dept: PHYSICAL THERAPY | Age: 59
Discharge: HOME | End: 2019-07-25
Payer: COMMERCIAL

## 2019-07-25 ENCOUNTER — CLINICAL SUPPORT (OUTPATIENT)
Dept: FAMILY MEDICINE CLINIC | Age: 59
End: 2019-07-25

## 2019-07-25 DIAGNOSIS — Z11.1 ENCOUNTER FOR PPD SKIN TEST READING: Primary | ICD-10-CM

## 2019-07-25 LAB
MM INDURATION POC: 0 MM (ref 0–5)
PPD POC: NEGATIVE NEGATIVE

## 2019-07-25 PROCEDURE — 97110 THERAPEUTIC EXERCISES: CPT

## 2019-07-25 NOTE — PROGRESS NOTES
PT DAILY TREATMENT NOTE 2-15    Patient Name: Vern Bernardo  Date:2019  : 1960  [x]  Patient  Verified  Payor: [address] [place] [address] Road / Plan: [address] / Product Type: Managed Care Medicaid /    In time:7:25a  Out time:8:20a  Total Treatment Time (min): 55  Visit #:  5    Treatment Area: Right knee pain [M25.561]  Right ankle pain [M25.571]    SUBJECTIVE  Pain Level (0-10 scale): 0/10- right ankle  Any medication changes, allergies to medications, adverse drug reactions, diagnosis change, or new procedure performed?: [x] No    [] Yes (see summary sheet for update)  Subjective functional status/changes:   [] No changes reported  Patient reports she is feeling good today and has not had any issues since last visit.     OBJECTIVE    Modality rationale: Patient declined   Min Type Additional Details       [] Estim: []Att   []Unatt    []TENS instruct                  []IFC  []Premod   []NMES                     []Other:  []w/US   []w/ice   []w/heat  Position:  Location:       []  Traction: [] Cervical       []Lumbar                       [] Prone          []Supine                       []Intermittent   []Continuous Lbs:  [] before manual  [] after manual  []w/heat    []  Ultrasound: []Continuous   [] Pulsed                       at: []1MHz   []3MHz Location:  W/cm2:    [] Paraffin         Location:   []w/heat    []  Ice     []  Heat  []  Ice massage Position:  Location:    []  Laser  []  Other: Position:  Location:      []  Vasopneumatic Device Pressure:       [] lo [] med [] hi   Temperature:      [x] Skin assessment post-treatment:  [x]intact []redness- no adverse reaction    []redness - adverse reaction:         55 min Therapeutic Exercise:  [x] See flow sheet :   Rationale: increase ROM, increase strength, improve coordination, improve balance and increase proprioception to improve the patients ability to walk without pain          With   [] TE   [] TA   [] neuro   [] other: Patient Education: [x] Review HEP    [] Progressed/Changed HEP based on:   [] positioning   [] body mechanics   [] transfers   [] heat/ice application    [] other:      Other Objective/Functional Measures:    Pt with improved from with squats, pain with eccentric and SL HR    Pain Level (0-10 scale) post treatment: 0    ASSESSMENT/Changes in Function:   Min-to-mod verbal cueing required with several exercises due to form concerns and speed of exercises. Patient will continue to benefit from skilled PT services to modify and progress therapeutic interventions, address functional mobility deficits, address ROM deficits, address strength deficits, analyze and address soft tissue restrictions, analyze and cue movement patterns, analyze and modify body mechanics/ergonomics and assess and modify postural abnormalities to attain remaining goals. []  See Plan of Care  []  See progress note/recertification  []  See Discharge Summary         Progress towards goals / Updated goals:  Patient able to advance several exercises and improved form with squats. Patient continues to require verbal cues for mechanics and speed with several exercise. Patient making good progress with improved mechanics for squats and overall tolerance for exercises.      PLAN  [x]  Upgrade activities as tolerated     [x]  Continue plan of care  []  Update interventions per flow sheet       []  Discharge due to:_  []  Other:_      Janet Brito , DEVEN  7/25/2019

## 2019-07-29 ENCOUNTER — APPOINTMENT (OUTPATIENT)
Dept: PHYSICAL THERAPY | Age: 59
End: 2019-07-29
Payer: COMMERCIAL

## 2019-07-31 ENCOUNTER — TELEPHONE (OUTPATIENT)
Dept: FAMILY MEDICINE CLINIC | Age: 59
End: 2019-07-31

## 2019-07-31 ENCOUNTER — HOSPITAL ENCOUNTER (OUTPATIENT)
Dept: PULMONOLOGY | Age: 59
Discharge: HOME | End: 2019-07-31
Attending: FAMILY MEDICINE
Payer: COMMERCIAL

## 2019-07-31 ENCOUNTER — HOSPITAL ENCOUNTER (OUTPATIENT)
Dept: NON INVASIVE DIAGNOSTICS | Age: 59
Discharge: HOME | End: 2019-07-31
Attending: FAMILY MEDICINE
Payer: COMMERCIAL

## 2019-07-31 VITALS
HEIGHT: 64 IN | BODY MASS INDEX: 25.44 KG/M2 | WEIGHT: 149 LBS | SYSTOLIC BLOOD PRESSURE: 147 MMHG | DIASTOLIC BLOOD PRESSURE: 88 MMHG

## 2019-07-31 DIAGNOSIS — Q24.9 CONGENITAL HEART DEFECT: ICD-10-CM

## 2019-07-31 DIAGNOSIS — R05.3 CHRONIC COUGH: ICD-10-CM

## 2019-07-31 LAB
ECHO AO ROOT DIAM: 3.66 CM
ECHO AV AREA PEAK VELOCITY: 2.5 CM2
ECHO AV AREA/BSA PEAK VELOCITY: 1.4 CM2/M2
ECHO AV PEAK GRADIENT: 5.5 MMHG
ECHO AV PEAK VELOCITY: 116.96 CM/S
ECHO EST RA PRESSURE: 3 MMHG
ECHO LA AREA 4C: 17.6 CM2
ECHO LA MAJOR AXIS: 3.76 CM
ECHO LA TO AORTIC ROOT RATIO: 1.03
ECHO LA VOL 2C: 54.75 ML (ref 22–52)
ECHO LA VOL 4C: 43.16 ML (ref 22–52)
ECHO LA VOL BP: 48.8 ML (ref 22–52)
ECHO LA VOL/BSA BIPLANE: 28.27 ML/M2 (ref 16–28)
ECHO LA VOLUME INDEX A2C: 31.72 ML/M2 (ref 16–28)
ECHO LA VOLUME INDEX A4C: 25.01 ML/M2 (ref 16–28)
ECHO LV E' LATERAL VELOCITY: 12.26 CENTIMETER/SECOND
ECHO LV E' SEPTAL VELOCITY: 7.44 CENTIMETER/SECOND
ECHO LV INTERNAL DIMENSION DIASTOLIC: 4.96 CM (ref 3.9–5.3)
ECHO LV INTERNAL DIMENSION SYSTOLIC: 3.27 CM
ECHO LV IVSD: 0.79 CM (ref 0.6–0.9)
ECHO LV MASS 2D: 155.5 G (ref 67–162)
ECHO LV MASS INDEX 2D: 77.8 G/M2 (ref 43–95)
ECHO LV POSTERIOR WALL DIASTOLIC: 0.83 CM (ref 0.6–0.9)
ECHO LVOT DIAM: 1.98 CM
ECHO LVOT PEAK GRADIENT: 3.5 MMHG
ECHO LVOT PEAK VELOCITY: 93.81 CM/S
ECHO MV A VELOCITY: 70.64 CM/S
ECHO MV AREA PHT: 3.8 CM2
ECHO MV E DECELERATION TIME (DT): 200 MS
ECHO MV E VELOCITY: 63.03 CM/S
ECHO MV E/A RATIO: 0.9
ECHO MV PRESSURE HALF TIME (PHT): 58 MS
ECHO MV REGURGITANT PEAK GRADIENT: 82.2 MMHG
ECHO MV REGURGITANT PEAK VELOCITY: 453.3 CM/S
ECHO PULMONARY ARTERY SYSTOLIC PRESSURE (PASP): 21.1 MMHG
ECHO PV MAX VELOCITY: 67.4 CM/S
ECHO PV PEAK GRADIENT: 1.8 MMHG
ECHO RIGHT VENTRICULAR SYSTOLIC PRESSURE (RVSP): 21.1 MMHG
ECHO RV INTERNAL DIMENSION: 2.75 CM
ECHO RV TAPSE: 1.7 CM (ref 1.5–2)
ECHO TV REGURGITANT MAX VELOCITY: 212.75 CM/S
ECHO TV REGURGITANT PEAK GRADIENT: 18.1 MMHG

## 2019-07-31 PROCEDURE — 94726 PLETHYSMOGRAPHY LUNG VOLUMES: CPT

## 2019-07-31 PROCEDURE — 94375 RESPIRATORY FLOW VOLUME LOOP: CPT

## 2019-07-31 PROCEDURE — 93306 TTE W/DOPPLER COMPLETE: CPT

## 2019-07-31 PROCEDURE — 94729 DIFFUSING CAPACITY: CPT

## 2019-07-31 RX ORDER — ALBUTEROL SULFATE 0.83 MG/ML
2.5 SOLUTION RESPIRATORY (INHALATION)
Status: DISCONTINUED | OUTPATIENT
Start: 2019-07-31 | End: 2019-07-31

## 2019-08-01 ENCOUNTER — APPOINTMENT (OUTPATIENT)
Dept: PHYSICAL THERAPY | Age: 59
End: 2019-08-01
Payer: COMMERCIAL

## 2019-08-01 ENCOUNTER — HOSPITAL ENCOUNTER (OUTPATIENT)
Dept: PHYSICAL THERAPY | Age: 59
Discharge: HOME | End: 2019-08-01
Payer: COMMERCIAL

## 2019-08-01 PROCEDURE — 97110 THERAPEUTIC EXERCISES: CPT

## 2019-08-01 NOTE — PROGRESS NOTES
PT DAILY TREATMENT NOTE 2-15    Patient Name: Joe Lin  Date:2019  : 1960  [x]  Patient  Verified  Payor: [address] [place] [address] Road / Plan: [address] / Product Type: Managed Care Medicaid /    In time:7:30a  Out time:8:10a  Total Treatment Time (min): 40  Visit #:  6    Treatment Area: Right knee pain [M25.561]  Right ankle pain [M25.571]    SUBJECTIVE  Pain Level (0-10 scale): 0/10- right ankle  Any medication changes, allergies to medications, adverse drug reactions, diagnosis change, or new procedure performed?: [x] No    [] Yes (see summary sheet for update)  Subjective functional status/changes:   [x] No changes reported  Patient reports she is feeling good today and has not had any issues since last visit.     OBJECTIVE    Modality rationale: Patient declined   Min Type Additional Details       [] Estim: []Att   []Unatt    []TENS instruct                  []IFC  []Premod   []NMES                     []Other:  []w/US   []w/ice   []w/heat  Position:  Location:       []  Traction: [] Cervical       []Lumbar                       [] Prone          []Supine                       []Intermittent   []Continuous Lbs:  [] before manual  [] after manual  []w/heat    []  Ultrasound: []Continuous   [] Pulsed                       at: []1MHz   []3MHz Location:  W/cm2:    [] Paraffin         Location:   []w/heat    []  Ice     []  Heat  []  Ice massage Position:  Location:    []  Laser  []  Other: Position:  Location:      []  Vasopneumatic Device Pressure:       [] lo [] med [] hi   Temperature:      [x] Skin assessment post-treatment:  [x]intact []redness- no adverse reaction    []redness - adverse reaction:         40 min Therapeutic Exercise:  [x] See flow sheet :   Rationale: increase ROM, increase strength, improve coordination, improve balance and increase proprioception to improve the patients ability to walk without pain          With   [] TE   [] TA   [] neuro   [] other: Patient Education: [x] Review HEP    [] Progressed/Changed HEP based on:   [] positioning   [] body mechanics   [] transfers   [] heat/ice application    [] other:      Other Objective/Functional Measures:    Pt with good squat form and shows improve ROM,     Pain Level (0-10 scale) post treatment: 0    ASSESSMENT/Changes in Function:   Min verbal cueing required with several exercises due to form concerns and speed of exercises. Patient will continue to benefit from skilled PT services to modify and progress therapeutic interventions, address functional mobility deficits, address ROM deficits, address strength deficits, analyze and address soft tissue restrictions, analyze and cue movement patterns, analyze and modify body mechanics/ergonomics and assess and modify postural abnormalities to attain remaining goals. []  See Plan of Care  []  See progress note/recertification  []  See Discharge Summary         Progress towards goals / Updated goals:  Patient able to advance several exercises and improved form with squats. Patient continues to require verbal cues for mechanics and speed with several exercise. Patient making good progress towards goals and discharge with improved mechanics for squats and overall tolerance for exercises.      PLAN  [x]  Upgrade activities as tolerated     [x]  Continue plan of care  []  Update interventions per flow sheet       []  Discharge due to:_  [x]  Other:_  Will go over goals and measurements at next visit    Mary Soto , PTA  8/1/2019

## 2019-08-01 NOTE — PROCEDURES
FEV1 - 2.33, 94%  FVC - 2.82, 85%  FEV1/FVC - 83%  TLC - 5.28, 100%  RV - 123%  DLCO - 86%    Spirometry and lung volumes reveal mild air trapping.   Diffusing capacity is normal.

## 2019-08-05 ENCOUNTER — APPOINTMENT (OUTPATIENT)
Dept: PHYSICAL THERAPY | Age: 59
End: 2019-08-05
Payer: COMMERCIAL

## 2019-08-08 ENCOUNTER — HOSPITAL ENCOUNTER (OUTPATIENT)
Dept: PHYSICAL THERAPY | Age: 59
Discharge: HOME | End: 2019-08-08
Payer: COMMERCIAL

## 2019-08-08 PROCEDURE — 97110 THERAPEUTIC EXERCISES: CPT

## 2019-08-08 NOTE — PROGRESS NOTES
[employer] [facility]  [facility]  [facility]  [place], [address]  Phone: [phone]  Fax: [phone]    Discharge Summary  2-15    Patient name: Zari Callaway  : 1960  Provider#: [id number]  Referral source: Renee Dumont MD      Medical/Treatment Diagnosis: Right knee pain [M25.561]  Right ankle pain [M25.571]     Prior Hospitalization: see medical history     Comorbidities: See Plan of Care  Prior Level of Function:See Plan of Care  Medications: Verified on Patient Summary List    Start of Care: 19      Onset Date:   Visits from Start of Care: 7     Missed Visits: 0  Reporting Period : 19 to 19      ASSESSMENT/SUMMARY OF CARE:      AROM Ankle:             L                      R     DF:                              15                    15  PF                                55                    55  INV                              45                    45  EVR                             30                    30     AROM Knee:  Flexion:                       135                  130  Extension:                   0                      0     MMT:                           L                      R  Hip Abduction:             5/5                   5/5  Gluts:                           5/5                   5/5  HS:                              4+/5                 5/5  Quads:           4+/5                 5/5         Patient demonstrates overall improved AROM and strength with no pain. Patient has met all goals and is able to complete all ADL's with no issues. Patient will do well with HEP to maintain gains made in therapy.     Short Term Goals: To be accomplished in 8 treatments:  1. Patient will be able to walk for 20 minutes with no pain or limitation. Met  2. Patient will be able to perform a sit-to-stand transfer with no pain or limitation. Met  3. Patient will be able to descend a curb with her R LE with no pain or limitation.  Met  Long Term Goals: To be accomplished in 16 treatments:  1. Patient will be able to walk for 30 minutes with no pain or limitation. Met  2. Patient will be able to squat through a full ROM with no pain or limitation. Met  3. Patient will be able to ambulate a flight of stairs with no pain or limitation. Progressing  Frequency / Duration: Patient to be seen 2 times per week for 8 weeks.         RECOMMENDATIONS:  [x]Discontinue therapy: [x]Patient has reached or is progressing toward set goals      []Patient is non-compliant or has abdicated      []Due to lack of appreciable progress towards set goals      []Other    Orlando Maravilla, PT 8/8/2019

## 2019-08-08 NOTE — PROGRESS NOTES
PT DAILY TREATMENT NOTE 2-15    Patient Name: Anuj Anthony  Date:2019  : 1960  [x]  Patient  Verified  [facility] [address] / Plan: [address] / Product Type: Managed Care Medicaid /    In time:7:30a  Out time:8:10a  Total Treatment Time (min): 40  Visit #:  7    Treatment Area: Right knee pain [M25.561]  Right ankle pain [M25.571]    SUBJECTIVE  Pain Level (0-10 scale): 0/10- right ankle  Any medication changes, allergies to medications, adverse drug reactions, diagnosis change, or new procedure performed?: [x] No    [] Yes (see summary sheet for update)  Subjective functional status/changes:   [x] No changes reported  Patient states sh feels much stronger and is ready for discharge.     OBJECTIVE    Modality rationale: Patient declined   Min Type Additional Details       [] Estim: []Att   []Unatt    []TENS instruct                  []IFC  []Premod   []NMES                     []Other:  []w/US   []w/ice   []w/heat  Position:  Location:       []  Traction: [] Cervical       []Lumbar                       [] Prone          []Supine                       []Intermittent   []Continuous Lbs:  [] before manual  [] after manual  []w/heat    []  Ultrasound: []Continuous   [] Pulsed                       at: []1MHz   []3MHz Location:  W/cm2:    [] Paraffin         Location:   []w/heat    []  Ice     []  Heat  []  Ice massage Position:  Location:    []  Laser  []  Other: Position:  Location:      []  Vasopneumatic Device Pressure:       [] lo [] med [] hi   Temperature:      [x] Skin assessment post-treatment:  [x]intact []redness- no adverse reaction    []redness - adverse reaction:         40 min Therapeutic Exercise:  [x] See flow sheet :   Rationale: increase ROM, increase strength, improve coordination, improve balance and increase proprioception to improve the patients ability to walk without pain          With   [] TE   [] TA   [] neuro   [] other: Patient Education: [x] Review HEP    [] Progressed/Changed HEP based on:   [] positioning   [] body mechanics   [] transfers   [] heat/ice application    [] other:      Other Objective/Functional Measures:        AROM Ankle:  L  R    DF:   15  15  PF   55  55  INV   45  45  EVR   30  30    AROM Knee:  Flexion:  135  130  Extension:  0  0    MMT:   L  R  Hip Abduction:  5/5  5/5  Gluts:   5/5  5/5  HS:   4+/5  5/5  Quads:  4+/5  5/5      Pain Level (0-10 scale) post treatment: 0    ASSESSMENT/Changes in Function:        []  See Plan of Care  []  See progress note/recertification  [x]  See Discharge Summary         Progress towards goals / Updated goals:  Patient demonstrates overall improved AROM and strength with no pain. Patient has met all goals and is able to complete all ADL's with no issues. Patient will do well with HEP to maintain gains made in therapy. Short Term Goals: To be accomplished in 8 treatments:  1. Patient will be able to walk for 20 minutes with no pain or limitation. Met  2. Patient will be able to perform a sit-to-stand transfer with no pain or limitation. Met  3. Patient will be able to descend a curb with her R LE with no pain or limitation. Met  Long Term Goals: To be accomplished in 16 treatments:  1. Patient will be able to walk for 30 minutes with no pain or limitation. Met  2. Patient will be able to squat through a full ROM with no pain or limitation. Met  3. Patient will be able to ambulate a flight of stairs with no pain or limitation. Progressing  Frequency / Duration: Patient to be seen 2 times per week for 8 weeks.     PLAN  []  Upgrade activities as tolerated     []  Continue plan of care  []  Update interventions per flow sheet       [x]  Discharge due to:_  []  Other:_      Huy Martino , DEVEN  8/8/2019

## 2019-08-18 ENCOUNTER — TELEPHONE (OUTPATIENT)
Dept: FAMILY MEDICINE CLINIC | Age: 59
End: 2019-08-18

## 2019-08-19 NOTE — TELEPHONE ENCOUNTER
I received her pulmonary function tests reports and there is not interpretation on them. Please call the pulmonary lab and have them add this.

## 2019-08-23 NOTE — TELEPHONE ENCOUNTER
Called pulmonary department and spoke with Brody Portillo. Brody Portillo states he will look in to getting pt's interpretation and connect office back, he was provided office number.

## 2019-08-23 NOTE — TELEPHONE ENCOUNTER
Dani Funk called office back and advises narrative is in connect care. Under procedure tab, pt's narrative is there.

## 2019-08-26 DIAGNOSIS — I10 ESSENTIAL HYPERTENSION: ICD-10-CM

## 2019-08-26 NOTE — TELEPHONE ENCOUNTER
Please call patient and let her know I finally received her test interpretation and she has some air trapping in her lungs, otherwise they are normal.  This all needs to be taken into context, so I can evaluate this more when she comes in for appointment next month. Nisha Treviño

## 2019-08-27 RX ORDER — AMLODIPINE BESYLATE 5 MG/1
5 TABLET ORAL DAILY
Qty: 90 TAB | Refills: 0 | Status: SHIPPED | OUTPATIENT
Start: 2019-08-27 | End: 2019-10-31 | Stop reason: ALTCHOICE

## 2019-09-03 NOTE — PROGRESS NOTES
HISTORY OF PRESENT ILLNESS  Chandler Mcleod is a 60 y.o. female. Chandler Mcleod is here due to her chronic cough. It is almost gone. I last saw her for this on 7/23/19 and started her on Prilosec which is working very well. She discontinued it about 2 weeks ago. Her PPD was negative and her pulmonary function tests showed mild air trapping. There is no personal or family history of lung problems. Review of Systems   Constitutional: Negative for chills, fever and malaise/fatigue. Respiratory: Positive for cough and wheezing. Negative for sputum production and shortness of breath. Cardiovascular: Negative for chest pain and leg swelling. Gastrointestinal: Negative for abdominal pain, heartburn, nausea and vomiting. Visit Vitals  /65   Pulse 72   Temp 97.8 °F (36.6 °C) (Oral)   Resp 18   Ht 5' 4\" (1.626 m)   Wt 146 lb (66.2 kg)   SpO2 98%   BMI 25.06 kg/m²       Physical Exam   Constitutional: She is oriented to person, place, and time. She appears well-developed and well-nourished. No distress. Cardiovascular: Normal rate, regular rhythm and normal heart sounds. Exam reveals no gallop and no friction rub. No murmur heard. Pulmonary/Chest: Effort normal and breath sounds normal. No respiratory distress. She has no wheezes. She has no rales. Abdominal: Soft. Normal appearance and bowel sounds are normal. She exhibits no distension. There is no hepatosplenomegaly. There is no tenderness. There is no rebound and no guarding. Musculoskeletal: She exhibits no edema. Neurological: She is alert and oriented to person, place, and time. Skin: Skin is warm and dry. She is not diaphoretic. Nursing note and vitals reviewed. ASSESSMENT and PLAN    ICD-10-CM ICD-9-CM    1. Chronic cough R05 786.2 budesonide (PULMICORT FLEXHALER) 90 mcg/actuation aepb inhaler   2.  Encounter for FIT (fecal immunochemical test) screening Z12.11 V76.51 OCCULT BLOOD IMMUNOASSAY,DIAGNOSTIC        Likely asthma exacerbated by gastroesophageal reflux disease  Restart Prilosec  Pulmicort  Kit for FOBT testing given to patient    Follow-up and Dispositions    · Return in about 6 weeks (around 10/16/2019) for cough. Reviewed plan of care. Patient has provided input and agrees with goals.

## 2019-09-04 ENCOUNTER — OFFICE VISIT (OUTPATIENT)
Dept: FAMILY MEDICINE CLINIC | Age: 59
End: 2019-09-04

## 2019-09-04 VITALS
BODY MASS INDEX: 24.92 KG/M2 | SYSTOLIC BLOOD PRESSURE: 110 MMHG | DIASTOLIC BLOOD PRESSURE: 65 MMHG | HEIGHT: 64 IN | WEIGHT: 146 LBS | RESPIRATION RATE: 18 BRPM | OXYGEN SATURATION: 98 % | TEMPERATURE: 97.8 F | HEART RATE: 72 BPM

## 2019-09-04 DIAGNOSIS — R05.3 CHRONIC COUGH: Primary | ICD-10-CM

## 2019-09-04 DIAGNOSIS — Z12.11 ENCOUNTER FOR FIT (FECAL IMMUNOCHEMICAL TEST) SCREENING: ICD-10-CM

## 2019-09-04 RX ORDER — PROPRANOLOL HYDROCHLORIDE 10 MG/1
TABLET ORAL 3 TIMES DAILY
COMMUNITY
End: 2019-10-31

## 2019-09-04 RX ORDER — ROSUVASTATIN CALCIUM 10 MG/1
10 TABLET, COATED ORAL
COMMUNITY
End: 2019-09-16

## 2019-09-04 NOTE — PATIENT INSTRUCTIONS
Using a Metered-Dose Inhaler: After Your Visit  Your Care Instructions  A metered-dose inhaler lets you breathe medicine into your lungs quickly. Inhaled medicine works faster than the same medicine in a pill. An inhaler allows you to take less medicine than you would need if you took it as a pill. \"Metered-dose\" means that the inhaler gives a measured amount of medicine each time you use it. A metered-dose inhaler delivers medicine in the form of a liquid mist.    Follow-up care is a key part of your treatment and safety. Be sure to make and go to all appointments, and call your doctor if you are having problems. Its also a good idea to know your test results and keep a list of the medicines you take. How can you care for yourself at home? To get started using your inhaler  · Talk with your doctor, respiratory therapist, or pharmacist to be sure you are using your inhaler the right way. It might help if you practice using it in front of a mirror. Use the inhaler exactly as prescribed. · Check that you have the correct medicine. If you use several inhalers, put a label on each one so that you know which one to use at the right time. · Keep track of how much medicine is in the inhaler. Check the label to see how many doses are in the container. If you know how many puffs you can take, you can replace the inhaler before you run out. Your doctor or pharmacist can teach you how to keep track of how much medicine is left. · If you are using a corticosteroid inhaler, gargle and rinse out your mouth with water after use. Do not swallow the water. Swallowing the water will increase the chance that the medicine will get into your bloodstream. This may make it more likely that you will have side effects from the medicine. To use an inhaler  · Shake the inhaler as directed, and remove the cap. Check the inhaler instructions to see if you need to prime your inhaler before you use it.  If it needs priming, follow the instructions on how to prime your inhaler. · Hold the inhaler upright with the mouthpiece at the bottom. · Tilt your head back a little, and breathe out slowly and completely. · Position the inhaler in one of two ways:   · You can place the inhaler 1 to 2 inches in front of your open mouth, without closing your lips over it. Try to open your mouth as wide as you can. Placing the inhaler in front of your open mouth may be better for getting the medicine into your lungs, but some people may find this too hard to do. · Or you can place the inhaler in your mouth. This is easier for most people and lowers the risk that any of the medicine will get into your eyes. · Start taking slow, even breaths through your mouth. Press down on the inhaler one time, then inhale fully. · Hold your breath for 10 seconds. This will let the medicine settle in your lungs. · If you need to take a second dose, wait 30 to 60 seconds to allow the inhaler valve to refill. Where can you learn more? Go to [employer]   Enter K111 in the search box to learn more about \"Using a Metered-Dose Inhaler: After Your Visit. \"    © 4397-9340 [employer], Incorporated. Care instructions adapted under license by [employer] (which disclaims liability or warranty for this information). This care instruction is for use with your licensed healthcare professional. If you have questions about a medical condition or this instruction, always ask your healthcare professional. [place] [address] any warranty or liability for your use of this information. Content Version: 9.9.610155;  Last Revised: February 23, 2012

## 2019-09-04 NOTE — PROGRESS NOTES
Chief Complaint   Patient presents with    Cough     6 wk f/u; pt states cough is improved     1. Have you been to the ER, urgent care clinic since your last visit? Hospitalized since your last visit? No     2. Have you seen or consulted any other health care providers outside of the [address] [place] [address] since your last visit? Include any pap smears or colon screening. No     Pt declines vaccines.

## 2019-09-11 LAB
HEMOCCULT STL QL IA: POSITIVE
SPECIMEN STATUS REPORT, ROLRST: NORMAL

## 2019-09-14 ENCOUNTER — TELEPHONE (OUTPATIENT)
Dept: FAMILY MEDICINE CLINIC | Age: 59
End: 2019-09-14

## 2019-09-15 NOTE — TELEPHONE ENCOUNTER
Please call patient and let her know she has blood in her stool and needs a colonoscopy.   Does she have a gastroenterologist?

## 2019-09-16 ENCOUNTER — TELEPHONE (OUTPATIENT)
Dept: FAMILY MEDICINE CLINIC | Age: 59
End: 2019-09-16

## 2019-09-16 ENCOUNTER — OFFICE VISIT (OUTPATIENT)
Dept: FAMILY MEDICINE CLINIC | Age: 59
End: 2019-09-16

## 2019-09-16 ENCOUNTER — HOSPITAL ENCOUNTER (OUTPATIENT)
Dept: GENERAL RADIOLOGY | Age: 59
Discharge: HOME | End: 2019-09-16
Payer: COMMERCIAL

## 2019-09-16 VITALS
HEIGHT: 64 IN | TEMPERATURE: 98 F | WEIGHT: 142 LBS | BODY MASS INDEX: 24.24 KG/M2 | HEART RATE: 64 BPM | SYSTOLIC BLOOD PRESSURE: 116 MMHG | RESPIRATION RATE: 18 BRPM | DIASTOLIC BLOOD PRESSURE: 72 MMHG

## 2019-09-16 DIAGNOSIS — M77.42 METATARSALGIA OF LEFT FOOT: Primary | ICD-10-CM

## 2019-09-16 DIAGNOSIS — M77.42 METATARSALGIA OF LEFT FOOT: ICD-10-CM

## 2019-09-16 DIAGNOSIS — R19.5 HEME POSITIVE STOOL: ICD-10-CM

## 2019-09-16 DIAGNOSIS — I10 ESSENTIAL HYPERTENSION: ICD-10-CM

## 2019-09-16 PROCEDURE — 73630 X-RAY EXAM OF FOOT: CPT

## 2019-09-16 RX ORDER — NAPROXEN 500 MG/1
500 TABLET ORAL 2 TIMES DAILY WITH MEALS
Qty: 30 TAB | Refills: 1 | Status: SHIPPED | OUTPATIENT
Start: 2019-09-16 | End: 2019-10-31

## 2019-09-16 NOTE — TELEPHONE ENCOUNTER
Pt called to advise of appointment with Dr. Clyde Ramos on 11/26/19 at 10:15 am and office is requesting notes be faxed to [phone].  [place]

## 2019-09-16 NOTE — PROGRESS NOTES
HISTORY OF PRESENT ILLNESS  Suha Coleman is a 60 y.o. female. Suha Coleman is here due to left foot pain, that is unchanged, for the past week. She was doing a lot of walking when this started. No previous history of trauma. Is is painful if she touches it or puts a lot of pressure on the foot. Nothing makes it better despite trying 2 Advil. She has HTN and is taking Norvasc but this does not seem to be related to her medication. Her FOBT was positive. Review of Systems   Constitutional: Negative for chills and fever. No weight gain   Respiratory: Negative for shortness of breath. Cardiovascular: Positive for leg swelling. Negative for chest pain. Musculoskeletal:        Left foot pain   Skin:        The foot is warm with mild redness       Visit Vitals  /72   Pulse 64   Temp 98 °F (36.7 °C) (Oral)   Resp 18   Ht 5' 4\" (1.626 m)   Wt 142 lb (64.4 kg)   BMI 24.37 kg/m²     Physical Exam   Constitutional: She is oriented to person, place, and time. She appears well-developed and well-nourished. No distress. Cardiovascular: Normal rate, regular rhythm and normal heart sounds. Exam reveals no gallop and no friction rub. No murmur heard. Pulses:       Dorsalis pedis pulses are 2+ on the left side. Posterior tibial pulses are 2+ on the left side. Pulmonary/Chest: Effort normal and breath sounds normal. No respiratory distress. She has no wheezes. She has no rales. Musculoskeletal: She exhibits edema. Left foot: There is tenderness and bony tenderness. There is normal capillary refill and no deformity. Left dorsal forefoot puffy. 2nd - 4th metatarsals tender, especially the 3rd one. 3rd metatarsal head extremely tender. No RLE edema. Neurological: She is alert and oriented to person, place, and time. Skin: Skin is warm and dry. She is not diaphoretic. No left foot redness or warmth   Nursing note and vitals reviewed.       ASSESSMENT and PLAN    ICD-10-CM ICD-9-CM 1. Metatarsalgia of left foot M77.42 726.70 XR FOOT LT MIN 3 V      naproxen (NAPROSYN) 500 mg tablet   2. Heme positive stool R19.5 792.1 REFERRAL TO GASTROENTEROLOGY   3. Essential hypertension I10 401.9         Metatarsalgia  Blood pressure controlled  Foot x-ray  Naprosyn prn  Supportive flat shoes with metatarsal bars  Gastroenterology referral    Follow-up and Dispositions    · Return if not better in 3-4 weeks or if worse. Reviewed plan of care. Patient has provided input and agrees with goals.

## 2019-09-16 NOTE — TELEPHONE ENCOUNTER
Called pt, to discuss FOBT results, however, per pt she rather discuss results at scheduled appointment with Dr. Kaylee Price today. This has been added to pt's visit.

## 2019-10-31 ENCOUNTER — OFFICE VISIT (OUTPATIENT)
Dept: FAMILY MEDICINE CLINIC | Age: 59
End: 2019-10-31

## 2019-10-31 VITALS
DIASTOLIC BLOOD PRESSURE: 68 MMHG | BODY MASS INDEX: 24.24 KG/M2 | HEIGHT: 64 IN | WEIGHT: 142 LBS | TEMPERATURE: 98 F | RESPIRATION RATE: 18 BRPM | SYSTOLIC BLOOD PRESSURE: 116 MMHG | HEART RATE: 81 BPM

## 2019-10-31 DIAGNOSIS — Z11.59 NEED FOR HEPATITIS C SCREENING TEST: ICD-10-CM

## 2019-10-31 DIAGNOSIS — I10 ESSENTIAL HYPERTENSION: Primary | ICD-10-CM

## 2019-10-31 DIAGNOSIS — M76.60 ACHILLES TENDON PAIN: ICD-10-CM

## 2019-10-31 DIAGNOSIS — R60.0 BILATERAL LEG EDEMA: ICD-10-CM

## 2019-10-31 RX ORDER — VALSARTAN 80 MG/1
80 TABLET ORAL DAILY
Qty: 30 TAB | Refills: 1 | Status: SHIPPED | OUTPATIENT
Start: 2019-10-31 | End: 2020-01-13

## 2019-10-31 RX ORDER — NAPROXEN 500 MG/1
500 TABLET ORAL 2 TIMES DAILY WITH MEALS
Qty: 30 TAB | Refills: 1
Start: 2019-10-31 | End: 2020-06-24

## 2019-11-01 LAB
BUN SERPL-MCNC: 14 MG/DL (ref 8–27)
BUN/CREAT SERPL: 19 (ref 12–28)
CALCIUM SERPL-MCNC: 9.5 MG/DL (ref 8.7–10.3)
CHLORIDE SERPL-SCNC: 103 MMOL/L (ref 96–106)
CO2 SERPL-SCNC: 24 MMOL/L (ref 20–29)
CREAT SERPL-MCNC: 0.75 MG/DL (ref 0.57–1)
GLUCOSE SERPL-MCNC: 122 MG/DL (ref 65–99)
HCV AB S/CO SERPL IA: <0.1 S/CO RATIO (ref 0–0.9)
HCV AB SERPL QL IA: NORMAL
POTASSIUM SERPL-SCNC: 3.4 MMOL/L (ref 3.5–5.2)
SODIUM SERPL-SCNC: 141 MMOL/L (ref 134–144)

## 2019-11-03 DIAGNOSIS — R73.9 ELEVATED BLOOD SUGAR: Primary | ICD-10-CM

## 2019-11-15 LAB
EST. AVERAGE GLUCOSE BLD GHB EST-MCNC: 114 MG/DL
HBA1C MFR BLD: 5.6 % (ref 4.8–5.6)

## 2019-12-11 NOTE — PROGRESS NOTES
HISTORY OF PRESENT ILLNESS  Frankie Koroma is a 60 y.o. female. Patient presents with:  Blood Pressure Check: 6 wk f/u  Jaw Pain: x 3 wks; went to Urgent Care 2 wks ago; was prescribed cyclobenzaprine 11/25/19    Frankie Koroma is here to follow up on their HTN. This is a chronic problem. Her Norvasc was stopped and replaced with Diovan at her last visit due to edema. The problem occurs constantly and is a little worse. The symptoms are relieved by Diovan, which is/are working well. The jaw pain is a little bit better, but it still painful to open her mouth. Flexeril helps a little. Review of Systems   Constitutional: Negative for weight loss. No weight gain   Eyes: Negative for blurred vision. Respiratory: Negative for shortness of breath. Cardiovascular: Negative for chest pain and leg swelling. Gastrointestinal: Negative for abdominal pain. Neurological: Negative for dizziness, sensory change, speech change, focal weakness and headaches. Visit Vitals  /80   Pulse 79   Temp 97.2 °F (36.2 °C) (Oral)   Resp 18   Ht 5' 4\" (1.626 m)   Wt 147 lb (66.7 kg)   BMI 25.23 kg/m²     BP Readings from Last 3 Encounters:   10/31/19 116/68   09/16/19 116/72   09/04/19 110/65     Physical Exam  Vitals signs and nursing note reviewed. Constitutional:       General: She is not in acute distress. Appearance: She is well-developed. She is not diaphoretic. HENT:      Head:        Right Ear: Tympanic membrane, ear canal and external ear normal.      Left Ear: Tympanic membrane, ear canal and external ear normal.   Cardiovascular:      Rate and Rhythm: Normal rate and regular rhythm. Heart sounds: Normal heart sounds. No murmur. No friction rub. No gallop. Pulmonary:      Effort: Pulmonary effort is normal. No respiratory distress. Breath sounds: Normal breath sounds. No wheezing or rales. Skin:     General: Skin is warm and dry.    Neurological:      Mental Status: She is alert and oriented to person, place, and time. ASSESSMENT and PLAN    ICD-10-CM ICD-9-CM    1. Essential hypertension I10 401.9    2. Arthralgia of right temporomandibular joint M26.621 524.62 ibuprofen (MOTRIN) 600 mg tablet        Blood pressure controlled  TMJ pain  Continue current plans. Motrin, heat prn  Soft diet  Dental follow up if not better in 2 weeks    Follow-up and Dispositions    · Return in about 6 months (around 6/12/2020) for blood pressure/if jaw not better in 2 weeks. Reviewed plan of care. Patient has provided input and agrees with goals.

## 2019-12-12 ENCOUNTER — OFFICE VISIT (OUTPATIENT)
Dept: FAMILY MEDICINE CLINIC | Age: 59
End: 2019-12-12

## 2019-12-12 VITALS
RESPIRATION RATE: 18 BRPM | HEART RATE: 79 BPM | DIASTOLIC BLOOD PRESSURE: 80 MMHG | HEIGHT: 64 IN | BODY MASS INDEX: 25.1 KG/M2 | WEIGHT: 147 LBS | TEMPERATURE: 97.2 F | SYSTOLIC BLOOD PRESSURE: 131 MMHG

## 2019-12-12 DIAGNOSIS — I10 ESSENTIAL HYPERTENSION: Primary | ICD-10-CM

## 2019-12-12 DIAGNOSIS — M26.621 ARTHRALGIA OF RIGHT TEMPOROMANDIBULAR JOINT: ICD-10-CM

## 2019-12-12 RX ORDER — CYCLOBENZAPRINE HCL 5 MG
TABLET ORAL
COMMUNITY
Start: 2019-11-25 | End: 2019-12-12

## 2019-12-12 RX ORDER — IBUPROFEN 600 MG/1
600 TABLET ORAL
Qty: 30 TAB | Refills: 1 | Status: SHIPPED | OUTPATIENT
Start: 2019-12-12 | End: 2020-06-24

## 2019-12-12 NOTE — PATIENT INSTRUCTIONS
Temporomandibular Disorder: Care Instructions  Your Care Instructions    Temporomandibular (TM) disorders are a problem with the muscles and joints that connect your jaw to your skull. They cause pain when you open your mouth, chew, or yawn. You may feel this pain on one or both sides. TM disorders are often caused by tight jaw muscles. The tightness can be caused by clenching or grinding your teeth. This may happen when you have a lot of stress in your life. If you lower your stress, you may be able to stop clenching or grinding your teeth. This will help relax your jaw and reduce your pain. You may also be able to do some things at home to feel better. But if none of this works, your doctor may prescribe medicine to help relax your muscles and control the pain. Follow-up care is a key part of your treatment and safety. Be sure to make and go to all appointments, and call your doctor if you are having problems. It's also a good idea to know your test results and keep a list of the medicines you take. How can you care for yourself at home? · Put a warm, moist cloth or heating pad set on low on your jaw. Do this for 10 to 20 minutes at a time. Put a thin cloth between the heating pad and your skin. · Avoid hard or chewy foods that cause your jaws to work very hard. Examples include popcorn, jerky, tough meats, chewy breads, gum, and raw apples and carrots. · Choose softer foods that are easy to chew. These include eggs, yogurt, and soup. · Cut your food into small pieces. Chew slowly. · If your jaw gets too painful to chew, or if it locks, you may need to puree your food for a few days or weeks. · To relax your jaw, repeat this exercise for a few minutes every morning and evening. Watch yourself in a mirror. Gently open and close your mouth. Move your jaw straight up and down. But don't do this if it makes your pain worse.   · Get at least 30 minutes of exercise on most days of the week to relieve stress. Walking is a good choice. You also may want to do other activities, such as running, swimming, cycling, or playing tennis or team sports. · Do not:  ? Hold a phone between your shoulder and your jaw. ? Open your mouth all the way, like when you sing loudly or yawn. ? Clench or grind your teeth, bite your lips, or chew your fingernails. ? Clench things such as pens, pipes, or cigars between your teeth. When should you call for help? Call your doctor now or seek immediate medical care if:    · Your jaw is locked open or shut or it is hard to move your jaw.    Watch closely for changes in your health, and be sure to contact your doctor if:    · Your jaw pain gets worse.     · Your face is swollen.     · You do not get better as expected. Where can you learn more? Go to http://ana-cadence.info/. Enter B952 in the search box to learn more about \"Temporomandibular Disorder: Care Instructions. \"  Current as of: October 3, 2018  Content Version: 12.2  © 5992-4537 [employer]. Care instructions adapted under license by [employer] (which disclaims liability or warranty for this information). If you have questions about a medical condition or this instruction, always ask your healthcare professional. [address] any warranty or liability for your use of this information.

## 2019-12-12 NOTE — PROGRESS NOTES
Chief Complaint   Patient presents with    Blood Pressure Check     6 wk f/u    Jaw Pain     x 3 wks; went to Urgent Care 2 wks ago; was prescribed cyclobenzaprine 11/25/19     1. Have you been to the ER, urgent care clinic since your last visit? Hospitalized since your last visit? Yes 11/25/19 Urgent Care for jaw pain    2. Have you seen or consulted any other health care providers outside of the [address] [place] [address] since your last visit? Include any pap smears or colon screening.  No

## 2020-01-21 ENCOUNTER — HOSPITAL ENCOUNTER (OUTPATIENT)
Age: 60
Setting detail: OUTPATIENT SURGERY
Discharge: HOME | End: 2020-01-21
Attending: INTERNAL MEDICINE | Admitting: INTERNAL MEDICINE
Payer: COMMERCIAL

## 2020-01-21 ENCOUNTER — ANESTHESIA (OUTPATIENT)
Dept: ENDOSCOPY | Age: 60
End: 2020-01-21
Payer: COMMERCIAL

## 2020-01-21 ENCOUNTER — ANESTHESIA EVENT (OUTPATIENT)
Dept: ENDOSCOPY | Age: 60
End: 2020-01-21
Payer: COMMERCIAL

## 2020-01-21 VITALS
RESPIRATION RATE: 18 BRPM | OXYGEN SATURATION: 100 % | DIASTOLIC BLOOD PRESSURE: 72 MMHG | TEMPERATURE: 97.8 F | WEIGHT: 145.72 LBS | SYSTOLIC BLOOD PRESSURE: 119 MMHG | HEART RATE: 72 BPM | BODY MASS INDEX: 24.28 KG/M2 | HEIGHT: 65 IN

## 2020-01-21 PROCEDURE — 76060000031 HC ANESTHESIA FIRST 0.5 HR: Performed by: INTERNAL MEDICINE

## 2020-01-21 PROCEDURE — 74011250636 HC RX REV CODE- 250/636: Performed by: INTERNAL MEDICINE

## 2020-01-21 PROCEDURE — 74011000250 HC RX REV CODE- 250: Performed by: NURSE ANESTHETIST, CERTIFIED REGISTERED

## 2020-01-21 PROCEDURE — 88305 TISSUE EXAM BY PATHOLOGIST: CPT

## 2020-01-21 PROCEDURE — 76040000019: Performed by: INTERNAL MEDICINE

## 2020-01-21 PROCEDURE — 74011250636 HC RX REV CODE- 250/636: Performed by: NURSE ANESTHETIST, CERTIFIED REGISTERED

## 2020-01-21 PROCEDURE — 77030021593 HC FCPS BIOP ENDOSC BSC -A: Performed by: INTERNAL MEDICINE

## 2020-01-21 RX ORDER — LIDOCAINE HYDROCHLORIDE 20 MG/ML
INJECTION, SOLUTION EPIDURAL; INFILTRATION; INTRACAUDAL; PERINEURAL AS NEEDED
Status: DISCONTINUED | OUTPATIENT
Start: 2020-01-21 | End: 2020-01-21 | Stop reason: HOSPADM

## 2020-01-21 RX ORDER — ATROPINE SULFATE 0.1 MG/ML
0.4 INJECTION INTRAVENOUS
Status: DISCONTINUED | OUTPATIENT
Start: 2020-01-21 | End: 2020-01-21 | Stop reason: HOSPADM

## 2020-01-21 RX ORDER — PROPOFOL 10 MG/ML
INJECTION, EMULSION INTRAVENOUS
Status: DISCONTINUED | OUTPATIENT
Start: 2020-01-21 | End: 2020-01-21 | Stop reason: HOSPADM

## 2020-01-21 RX ORDER — MIDAZOLAM HYDROCHLORIDE 1 MG/ML
.25-5 INJECTION, SOLUTION INTRAMUSCULAR; INTRAVENOUS
Status: DISCONTINUED | OUTPATIENT
Start: 2020-01-21 | End: 2020-01-21 | Stop reason: HOSPADM

## 2020-01-21 RX ORDER — FLUMAZENIL 0.1 MG/ML
0.2 INJECTION INTRAVENOUS
Status: DISCONTINUED | OUTPATIENT
Start: 2020-01-21 | End: 2020-01-21 | Stop reason: HOSPADM

## 2020-01-21 RX ORDER — SODIUM CHLORIDE 9 MG/ML
50 INJECTION, SOLUTION INTRAVENOUS CONTINUOUS
Status: DISCONTINUED | OUTPATIENT
Start: 2020-01-21 | End: 2020-01-21 | Stop reason: HOSPADM

## 2020-01-21 RX ORDER — NALOXONE HYDROCHLORIDE 0.4 MG/ML
0.4 INJECTION, SOLUTION INTRAMUSCULAR; INTRAVENOUS; SUBCUTANEOUS
Status: DISCONTINUED | OUTPATIENT
Start: 2020-01-21 | End: 2020-01-21 | Stop reason: HOSPADM

## 2020-01-21 RX ORDER — EPINEPHRINE 0.1 MG/ML
1 INJECTION INTRACARDIAC; INTRAVENOUS
Status: DISCONTINUED | OUTPATIENT
Start: 2020-01-21 | End: 2020-01-21 | Stop reason: HOSPADM

## 2020-01-21 RX ORDER — DEXTROMETHORPHAN/PSEUDOEPHED 2.5-7.5/.8
1.2 DROPS ORAL
Status: DISCONTINUED | OUTPATIENT
Start: 2020-01-21 | End: 2020-01-21 | Stop reason: HOSPADM

## 2020-01-21 RX ORDER — PROPOFOL 10 MG/ML
INJECTION, EMULSION INTRAVENOUS AS NEEDED
Status: DISCONTINUED | OUTPATIENT
Start: 2020-01-21 | End: 2020-01-21 | Stop reason: HOSPADM

## 2020-01-21 RX ADMIN — PROPOFOL 125 MCG/KG/MIN: 10 INJECTION, EMULSION INTRAVENOUS at 13:21

## 2020-01-21 RX ADMIN — PROPOFOL 100 MG: 10 INJECTION, EMULSION INTRAVENOUS at 13:21

## 2020-01-21 RX ADMIN — LIDOCAINE HYDROCHLORIDE 40 MG: 20 INJECTION, SOLUTION INTRAVENOUS at 13:21

## 2020-01-21 RX ADMIN — SODIUM CHLORIDE 50 ML/HR: 900 INJECTION, SOLUTION INTRAVENOUS at 12:38

## 2020-01-21 NOTE — DISCHARGE INSTRUCTIONS
[address] [facility]. Jhon Carbajal M.D.  [phone]            COLON DISCHARGE INSTRUCTIONS       2020    Justin Wilkerson  :  1960  [facility] Medical Record Number:  [id number]      COLONOSCOPY FINDINGS:  Your colonoscopy showed one diminutive polyp that was removed and sent to pathology, otherwise mucosa within normal. Small internal hemorrhoids. DISCOMFORT:  Redness at IV site- apply warm compress to area; if redness or soreness persist- contact your physician  There may be a slight amount of blood passed from the rectum  Gaseous discomfort- walking, belching will help relieve any discomfort  You may not operate a vehicle for 12 hours  You may not engage in an occupation involving machinery or appliances for rest of today  You may not drink alcoholic beverages for at least 12 hours  Avoid making any critical decisions for at least 24 hour  DIET:   High fiber diet. - however -  remember your colon is empty and a heavy meal will produce gas. Avoid these foods:  vegetables, fried / greasy foods, carbonated drinks for today     ACTIVITY:  You may resume your normal daily activities it is recommended that you spend the remainder of the day resting -  avoid any strenuous activity. CALL M.D. ANY SIGN OF:   Increasing pain, nausea, vomiting  Abdominal distension (swelling)  New increased bleeding (oral or rectal)  Fever (chills)  Pain in chest area  Bloody discharge from nose or mouth   Shortness of breath    Follow-up Instructions:   Call Dr. Maddison Payne if any questions or problems. Telephone # [phone]  Biopsy results will be available in  5 to 7 days  Should have a repeat colonoscopy in 5 years if polyp shows adenoma. Maintain high fiber diet and daily bowel regimen to avoid constipation.

## 2020-01-21 NOTE — ROUTINE PROCESS
Rachel Gunn  1960  [id number]    Situation:  Verbal report received from: Lexy Sanderson RN  Procedure: Procedure(s):  COLONOSCOPY  ENDOSCOPIC POLYPECTOMY    Background:    Preoperative diagnosis: CONSTIPATION  Postoperative diagnosis: Transverse Colon Polyp  Hemorrhoids    :  Dr. Gutierrez Eli  Assistant(s): Endoscopy Technician-1: Heather Puckett  Endoscopy RN-1: Louis Fitzgerald RN    Specimens:   ID Type Source Tests Collected by Time Destination   1 : Transverse Colon Polyp Preservative   Radha Baptiste MD 1/21/2020 1330 Pathology     H. Pylori  no    Assessment:  Intra-procedure medications   Anesthesia gave intra-procedure sedation and medications, see anesthesia flow sheet yes    Intravenous fluids: NS@ KVO     Vital signs stable     Abdominal assessment: round and soft     Recommendation:  Discharge patient per MD order.   Family or Friend   Permission to share finding with family or friend yes

## 2020-01-21 NOTE — ANESTHESIA PREPROCEDURE EVALUATION
Relevant Problems   No relevant active problems       Anesthetic History   No history of anesthetic complications            Review of Systems / Medical History  Patient summary reviewed, nursing notes reviewed and pertinent labs reviewed    Pulmonary  Within defined limits                 Neuro/Psych   Within defined limits           Cardiovascular    Hypertension          Hyperlipidemia         GI/Hepatic/Renal  Within defined limits              Endo/Other        Arthritis     Other Findings              Physical Exam    Airway  Mallampati: II  TM Distance: 4 - 6 cm  Neck ROM: normal range of motion   Mouth opening: Normal     Cardiovascular    Rhythm: regular  Rate: normal         Dental    Dentition: Lower dentition intact and Upper dentition intact     Pulmonary  Breath sounds clear to auscultation               Abdominal         Other Findings            Anesthetic Plan    ASA: 2  Anesthesia type: MAC          Induction: Intravenous  Anesthetic plan and risks discussed with: Patient

## 2020-01-21 NOTE — PROCEDURES
Mallroy Noriega M.D.  [phone]            2020          Colonoscopy Operative Report  Yosvany León  :  1960  [facility] Medical Record Number:  [id number]      Indications:    positive cologuard test     :  Abi Rojas MD    Referring Provider: Roxann Escamilla MD    Sedation:  MAC anesthesia    Pre-Procedural Exam:      Airway: clear,  No airway problems anticipated  Heart: RRR, without gallops or rubs  Lungs: clear bilaterally without wheezes, crackles, or rhonchi  Abdomen: soft, nontender, nondistended, bowel sounds present  Mental Status: awake, alert and oriented to person, place and time     Procedure Details:  After informed consent was obtained with all risks and benefits of procedure explained and preoperative exam completed, the patient was taken to the endoscopy suite and placed in the left lateral decubitus position. Upon sequential sedation as per above, a digital rectal exam was performed. The Olympus videocolonoscope  was inserted in the rectum and carefully advanced to the cecum, which was identified by the ileocecal valve and appendiceal orifice. The quality of preparation was excellent. The colonoscope was slowly withdrawn with careful inspection and evaluation between folds. Retroflexion in the rectum was performed. Findings:   Terminal Ileum: normal  Cecum: normal  Ascending Colon: normal  Transverse Colon: 1  Sessile polyp(s), the largest 2 mm in size; Descending Colon: normal  Sigmoid: normal  Rectum: no mucosal lesion appreciated  Grade 1 internal hemorrhoid(s); Interventions:  1 complete polypectomy were performed using cold biopsy forceps and the polyps were  retrieved    Specimen Removed:  specimen #1, 2 mm in size, located in the transverse colon removed by cold biopsy and sent for pathology    Complications: None. EBL:  None.     Impression:  One diminutive polyp removed and sent to pathology, otherwise mucosa within normal. Small internal hemorrhoids    Recommendations:  -If adenoma is present, repeat colonoscopy in 5 years.   -High fiber diet.    -Resume normal medication(s). Discharge Disposition:  Home in the company of a  when able to ambulate.     Patricia Arevalo MD  1/21/2020  1:41 PM

## 2020-01-21 NOTE — H&P
The patient is a 60year old female who presents with a complaint of Constipation. Reason for encounter: consultation at the request of Dr. Abena Berger). Note for \"Constipation\": She has had issues with constipation all her life. Her father is a [facility] medicine doctor and has tried different herbal supplements without relief. She has a bowel movement once every day or 2 days, and when she travels she has more constipation. She has tried Miralax but it did not work well enough. She denies seeing any blood in the stools or black in the stools however she had occult blood in the stools at Dr. Ari Dior. She has never had a colonoscopy. She reports intermittent issues with hemorrhoids over the years after her son's delivery. She had surgery for hemorrhoids 28 years ago. She denies any family history of colon cancer, however reports her sister was diagnosed with ovarian cancer. Problem List/Past Medical Monika Garcia MD; 11/25/2019 10:48 AM)  No Known Problems  [11/25/2019]: (Marked as Inactive)    Past Surgical History (Rocco Virk; 11/25/2019 10:31 AM)  Foot Surgery - Right   ankle  Knee Replacement, Total   Right. Allergies (Rocco Virk; 11/25/2019 10:31 AM)  No Known Drug Allergies  [11/21/2019]:  No Known Allergies  [11/21/2019]:    Medication History (Rocco Virk; 11/25/2019 10:32 AM)  Valsartan  (80MG Tablet, Oral daily) Active. Medications Reconciled     Family History (Rocco Ricardo [facility]; 11/25/2019 10:32 AM)  Hypertension   Mother. Social History (Rocco Virk; 11/25/2019 10:32 AM)  Blood Transfusion   No.  Alcohol Use   Has never drank. Employment status   Retired. Marital status   . Tobacco Use   Never smoker. Review of Systems (Rocco Virk; 11/25/2019 10:31 AM)  General Not Present- Chronic Fatigue, Poor Appetite, Weight Gain and Weight Loss. Skin Not Present- Itching, Rash and Skin Color Changes.   HEENT Not Present- Hearing Loss and Vertigo. Respiratory Not Present- Difficulty Breathing and TB exposure. Cardiovascular Present- Hypertension. Not Present- Chest Pain, Use of Antibiotics before Dental Procedures and Use of Blood Thinners. Gastrointestinal Present- See HPI. Musculoskeletal Not Present- Arthritis, Hip Replacement Surgery and Knee Replacement Surgery. Neurological Not Present- Weakness. Psychiatric Not Present- Depression. Endocrine Not Present- Diabetes and Thyroid Problems. Hematology Not Present- Anemia. Vitals (Leopold Caroline. Meekins; 11/25/2019 10:27 AM)  11/25/2019 10:27 AM  Weight: 150 lb   Height: 66 in   Body Surface Area: 1.77 m²   Body Mass Index: 24.21 kg/m²    Temp.: 98.4° F     BP: 122/78 (Sitting, Left Arm, Standard)              Physical Exam (Jose Addison MD; 11/25/2019 10:47 AM)  General  Mental Status - Alert. General Appearance - Cooperative, Pleasant, Not in acute distress. Orientation - Oriented X3. Build & Nutrition - Well nourished and Well developed. Integumentary  General Characteristics  Overall examination of the patient's skin reveals - no rashes, no bruises and no spider angiomas. Color - normal coloration of skin. Head and Neck  Neck  Global Assessment - full range of motion and supple, no bruit auscultated on the right, no bruit auscultated on the left, non-tender, no lymphadenopathy. Thyroid  Gland Characteristics - normal size and consistency. Eye  Eyeball - Left - No Exophthalmos. Eyeball - Right - No Exophthalmos. Sclera/Conjunctiva - Left - No Jaundice. Sclera/Conjunctiva - Right - No Jaundice. Chest and Lung Exam  Chest and lung exam reveals  - quiet, even and easy respiratory effort with no use of accessory muscles. Auscultation  Breath sounds - Normal. Adventitious sounds - No Adventitious sounds. Cardiovascular  Auscultation  Rhythm - Regular, No Tachycardia, No Bradycardia . Heart Sounds - Normal heart sounds , S1 WNL and S2 WNL, No S3, No Summation Gallop.  Murmurs & Other Heart Sounds - Auscultation of the heart reveals - No Murmurs. Abdomen  Inspection  Inspection of the abdomen reveals - Non-distended. Palpation/Percussion  Tenderness - Non-Tender. Rebound tenderness - No rebound. Liver - No hepatosplenomegaly. Abdominal Mass Palpable - No masses. Other Characteristics - No Ascites. Organomegally - None. Auscultation  Auscultation of the abdomen reveals - Bowel sounds normal, No Abdominal bruits and No Succussion splash. Rectal - Did not examine. Peripheral Vascular  Upper Extremity  Inspection - Left - Normal - No Clubbing, No Cyanosis, No Edema, Pulses Intact. Right - Normal - No Clubbing, No Cyanosis, No Edema, Pulses Intact. Palpation - Edema - Left - No edema. Right - No edema. Lower Extremity  Inspection - Left - Inspection Normal. Right - Inspection Normal. Palpation - Edema - Left - No edema. Right - No edema. Neurologic  Neurologic evaluation reveals  - Cranial nerves grossly intact, no focal neurologic deficits. Motor  Involuntary Movements - Asterixis - not present. Musculoskeletal  Global Assessment  Gait and Station - normal gait and station. Assessment & Plan (Jose Addison MD; 11/28/2019 7:00 AM)  Constipation (Preliminary Diagnosis) (564.00  K59.00)  Impression: Recommended high fiber diet, fiber supplementation and Miralax 17g orally every night. Positive colorectal cancer screening using Cologuard test (787.7  R19.5)  Impression: Discussed at length with her, will need to proceed with colonoscopy. Current Plans  COLONOSCOPY, DIAGNOSTIC (64603) (Discussed risks and benefits with the patient to include:; perforation, post polypectomy, or post biopsy bleeding, missed lesions, and sedation reactions.)  Started Suprep Bowel Prep Kit 17.5-3.13-1.6GM/177ML, 180 Milliliter Take as directed before Colonoscopy, 180 Milliliter, 1 day starting 11/25/2019, No Refill.   Pt Education - How to access health information online: discussed with patient and provided information. Patient is to call me for any questions or concerns.

## 2020-01-21 NOTE — ANESTHESIA POSTPROCEDURE EVALUATION
Procedure(s):  COLONOSCOPY  ENDOSCOPIC POLYPECTOMY. MAC    Anesthesia Post Evaluation        Patient location during evaluation: PACU  Level of consciousness: awake  Pain management: adequate  Airway patency: patent  Anesthetic complications: no  Cardiovascular status: acceptable  Respiratory status: acceptable  Hydration status: acceptable  Post anesthesia nausea and vomiting:  none      Vitals Value Taken Time   /72 1/21/2020  2:05 PM   Temp 36.6 °C (97.8 °F) 1/21/2020  1:45 PM   Pulse 67 1/21/2020  2:06 PM   Resp 15 1/21/2020  2:06 PM   SpO2 98 % 1/21/2020  2:06 PM   Vitals shown include unvalidated device data.

## 2020-02-07 ENCOUNTER — HOSPITAL ENCOUNTER (OUTPATIENT)
Dept: CT IMAGING | Age: 60
Discharge: HOME | End: 2020-02-07
Attending: ORTHOPAEDIC SURGERY
Payer: COMMERCIAL

## 2020-02-07 DIAGNOSIS — Z96.659 KNEE JOINT REPLACEMENT STATUS: ICD-10-CM

## 2020-02-07 PROCEDURE — 73700 CT LOWER EXTREMITY W/O DYE: CPT

## 2020-02-10 DIAGNOSIS — I10 ESSENTIAL HYPERTENSION: ICD-10-CM

## 2020-02-10 RX ORDER — VALSARTAN 80 MG/1
80 TABLET ORAL DAILY
Qty: 90 TAB | Refills: 4 | Status: SHIPPED | OUTPATIENT
Start: 2020-02-10 | End: 2021-01-11 | Stop reason: SDUPTHER

## 2020-06-02 ENCOUNTER — TELEPHONE (OUTPATIENT)
Dept: FAMILY MEDICINE CLINIC | Age: 60
End: 2020-06-02

## 2020-06-24 ENCOUNTER — OFFICE VISIT (OUTPATIENT)
Dept: FAMILY MEDICINE CLINIC | Age: 60
End: 2020-06-24

## 2020-06-24 ENCOUNTER — HOSPITAL ENCOUNTER (OUTPATIENT)
Dept: MAMMOGRAPHY | Age: 60
Discharge: HOME | End: 2020-06-24
Attending: FAMILY MEDICINE
Payer: COMMERCIAL

## 2020-06-24 VITALS
SYSTOLIC BLOOD PRESSURE: 131 MMHG | RESPIRATION RATE: 18 BRPM | DIASTOLIC BLOOD PRESSURE: 90 MMHG | WEIGHT: 149 LBS | HEIGHT: 65 IN | BODY MASS INDEX: 24.83 KG/M2 | HEART RATE: 73 BPM | OXYGEN SATURATION: 99 % | TEMPERATURE: 97.6 F

## 2020-06-24 DIAGNOSIS — I10 ESSENTIAL HYPERTENSION: Primary | ICD-10-CM

## 2020-06-24 DIAGNOSIS — Z12.31 BREAST CANCER SCREENING BY MAMMOGRAM: ICD-10-CM

## 2020-06-24 DIAGNOSIS — G47.00 INSOMNIA, UNSPECIFIED TYPE: ICD-10-CM

## 2020-06-24 DIAGNOSIS — F43.21 GRIEF: ICD-10-CM

## 2020-06-24 DIAGNOSIS — E78.00 HYPERCHOLESTEROLEMIA: ICD-10-CM

## 2020-06-24 PROCEDURE — 77067 SCR MAMMO BI INCL CAD: CPT

## 2020-06-24 RX ORDER — TRAZODONE HYDROCHLORIDE 50 MG/1
50 TABLET ORAL
Qty: 30 TAB | Refills: 0 | Status: SHIPPED | OUTPATIENT
Start: 2020-06-24 | End: 2020-10-14

## 2020-06-24 NOTE — PROGRESS NOTES
Chief Complaint   Patient presents with    Mass     left sdie under upper abdomen - no pain- had for 1.5 month    Groin Pain     right side only at night     Hypertension     follow up

## 2020-06-24 NOTE — PROGRESS NOTES
HISTORY OF PRESENT ILLNESS  Génesis Quinonez is a 60 y.o. female. Génesis Quinonez is here to follow up on their HTN. This is a chronic problem. The problem occurs constantly and is worse. The symptoms are relieved by Diovan, which is/are working moderately    She is not sleeping because her  recently passed away. Her son is with her and she talks with her daughter every day. She is going through his things right now. Review of Systems   Constitutional: Negative for weight loss. No weight gain   Eyes: Negative for blurred vision. Respiratory: Negative for shortness of breath. Cardiovascular: Negative for chest pain and leg swelling. Gastrointestinal: Negative for abdominal pain. Neurological: Negative for dizziness, sensory change, speech change, focal weakness and headaches. Psychiatric/Behavioral: Positive for depression. The patient has insomnia. The patient is not nervous/anxious. Visit Vitals  /90   Pulse 73   Temp 97.6 °F (36.4 °C) (Temporal)   Resp 18   Ht 5' 5\" (1.651 m)   Wt 149 lb (67.6 kg)   SpO2 99%   BMI 24.79 kg/m²     BP Readings from Last 3 Encounters:   01/21/20 119/72   12/12/19 131/80   10/31/19 116/68       Physical Exam  Vitals signs and nursing note reviewed. Constitutional:       General: She is not in acute distress. Appearance: She is well-developed. She is not diaphoretic. Cardiovascular:      Rate and Rhythm: Normal rate and regular rhythm. Heart sounds: Normal heart sounds. No murmur. No friction rub. No gallop. Pulmonary:      Effort: Pulmonary effort is normal. No respiratory distress. Breath sounds: Normal breath sounds. No wheezing or rales. Skin:     General: Skin is warm and dry. Neurological:      Mental Status: She is alert and oriented to person, place, and time. Psychiatric:      Comments: tearful         ASSESSMENT and PLAN    ICD-10-CM ICD-9-CM    1. Essential hypertension J12 224.0 METABOLIC PANEL, BASIC   2. Hypercholesterolemia E78.00 272.0 LIPID PANEL      HEPATIC FUNCTION PANEL   3. Insomnia, unspecified type G47.00 780.52 traZODone (DESYREL) 50 mg tablet   4. Grief F43.21 309.0         Blood pressure elevated, likely due to her stress and grief, insomnia due to this  Needs follow up labs  Labs per orders. Trazodone     Follow-up and Dispositions    · Return in about 3 weeks (around 7/15/2020) for blood pressure, grief, insomnia. Reviewed plan of care. Patient has provided input and agrees with goals.

## 2020-06-24 NOTE — PATIENT INSTRUCTIONS
Grief (Actual/Anticipated): Care Instructions  Your Care Instructions     Grief is your emotional reaction to a major loss. The words \"sorrow\" and \"heartache\" often are used to describe feelings of grief. You feel grief when you lose a beloved person, pet, place, or thing. It is also natural to feel grief when you lose a valued way of life, such as a job, marriage, or good health. You may begin to grieve before a loss occurs. You may grieve for a loved one who is sick and dying. Children and adults often feel the pain of loss before a big move or divorce. This type of grief helps you get ready for a loss. Grief is different for each person. There is no \"normal\" or \"expected\" period of time for grieving. Some people adjust to their loss within a couple of months. Others may take 2 years or longer, especially if their lives were changed a lot or if the loss was sudden and shocking. Grieving can cause problems such as headaches, loss of appetite, and trouble with thinking or sleeping. You may withdraw from friends and family and behave in ways that are unusual for you. Grief may cause you to question your beliefs and views about life. Grief is natural and does not require medical treatment. But if you have trouble sleeping, it may help to take sleeping pills for a short time. It may help to talk with people who have been through or are going through similar losses. You may also want to talk to a counselor about your feelings. Talking about your loss, sharing your cares and concerns, and getting support from others are important parts of healthy grieving. Follow-up care is a key part of your treatment and safety. Be sure to make and go to all appointments, and call your doctor if you are having problems. It's also a good idea to know your test results and keep a list of the medicines you take. How can you care for yourself at home? · Get enough sleep. Your mind helps make sense of your life while you sleep. Missing sleep can lead to illness and make it harder for you to deal with your grief. · Eat healthy foods. Try to avoid eating only foods that give you comfort. Ask someone to join you for a meal if you do not like eating alone. Consider taking a multivitamin every day. · Get some exercise every day. Even a walk can help you deal with your grief. Other exercises, such as yoga, can also help you manage stress. · Comfort yourself. Take time to look at photos or use special items that make you feel better. · Stay involved in your life. Do not withdraw from the activities you enjoy. People you know at work, Rastafari, clubs, or other groups can help you get through your period of grief. · Think about joining a support group to help you deal with your grief. There are many support groups to help people recover from grief. When should you call for help? [id number] anytime you think you may need emergency care. For example, call if:  · You feel you cannot stop from hurting yourself or someone else. Watch closely for changes in your health, and be sure to contact your doctor if:  · You think you may be depressed. · You do not get better as expected. Where can you learn more? Go to http://ana-cadence.info/  Enter H249 in the search box to learn more about \"Grief (Actual/Anticipated): Care Instructions. \"  Current as of: December 9, 2019               Content Version: 12.5  © 2507-3708 [employer], [employer]. Care instructions adapted under license by [employer] (which disclaims liability or warranty for this information). If you have questions about a medical condition or this instruction, always ask your healthcare professional. [address] any warranty or liability for your use of this information.

## 2020-06-25 ENCOUNTER — HOSPITAL ENCOUNTER (OUTPATIENT)
Dept: LAB | Age: 60
Discharge: HOME | End: 2020-06-25

## 2020-06-25 DIAGNOSIS — I10 ESSENTIAL HYPERTENSION: ICD-10-CM

## 2020-06-25 DIAGNOSIS — E78.00 HYPERCHOLESTEROLEMIA: ICD-10-CM

## 2020-06-25 LAB
ALBUMIN SERPL-MCNC: 3.9 G/DL (ref 3.5–5)
ALBUMIN/GLOB SERPL: 1.2 {RATIO} (ref 1.1–2.2)
ALP SERPL-CCNC: 110 U/L (ref 45–117)
ALT SERPL-CCNC: 24 U/L (ref 12–78)
ANION GAP SERPL CALC-SCNC: 7 MMOL/L (ref 5–15)
AST SERPL-CCNC: 11 U/L (ref 15–37)
BILIRUB DIRECT SERPL-MCNC: 0.2 MG/DL (ref 0–0.2)
BILIRUB SERPL-MCNC: 0.6 MG/DL (ref 0.2–1)
BUN SERPL-MCNC: 14 MG/DL (ref 6–20)
BUN/CREAT SERPL: 23 (ref 12–20)
CALCIUM SERPL-MCNC: 8.7 MG/DL (ref 8.5–10.1)
CHLORIDE SERPL-SCNC: 109 MMOL/L (ref 97–108)
CHOLEST SERPL-MCNC: 180 MG/DL
CO2 SERPL-SCNC: 25 MMOL/L (ref 21–32)
CREAT SERPL-MCNC: 0.61 MG/DL (ref 0.55–1.02)
GLOBULIN SER CALC-MCNC: 3.2 G/DL (ref 2–4)
GLUCOSE SERPL-MCNC: 102 MG/DL (ref 65–100)
HDLC SERPL-MCNC: 49 MG/DL
HDLC SERPL: 3.7 {RATIO} (ref 0–5)
LDLC SERPL CALC-MCNC: 98.2 MG/DL (ref 0–100)
LIPID PROFILE,FLP: ABNORMAL
POTASSIUM SERPL-SCNC: 3.8 MMOL/L (ref 3.5–5.1)
PROT SERPL-MCNC: 7.1 G/DL (ref 6.4–8.2)
SODIUM SERPL-SCNC: 141 MMOL/L (ref 136–145)
TRIGL SERPL-MCNC: 164 MG/DL (ref ?–150)
VLDLC SERPL CALC-MCNC: 32.8 MG/DL

## 2020-07-19 NOTE — PROGRESS NOTES
HISTORY OF PRESENT ILLNESS  Leobardo Holm is a 60 y.o. female. Patient presents with:  Hypertension: followup   Possible Hernia: upper abdomen- not pain but sore     Leobardo Holm is here to follow up on their HTN. This is a chronic problem. The problem occurs constantly and is improved. The symptoms are relieved by Diovan, which is/are working well. Her blood pressure was elevated last visit, in part, due to her grief, depression and insomnia related to her 's recent death. She is better and is sleeping OK. Her right abs are larger than her left. Review of Systems   Constitutional: Negative for malaise/fatigue and weight loss. No weight gain   Eyes: Negative for blurred vision. Respiratory: Negative for shortness of breath. Cardiovascular: Negative for chest pain, palpitations and leg swelling. Neurological: Positive for headaches. Negative for dizziness, tingling, sensory change, speech change and focal weakness. Occasional headaches, good relief with Tylenol   Psychiatric/Behavioral: Negative for depression, hallucinations, substance abuse and suicidal ideas. The patient is not nervous/anxious and does not have insomnia. Visit Vitals  /72   Pulse 76   Temp 97.6 °F (36.4 °C) (Temporal)   Resp 20   Ht 5' 5\" (1.651 m)   Wt 150 lb (68 kg)   SpO2 98%   BMI 24.96 kg/m²       BP Readings from Last 3 Encounters:   06/24/20 131/90   01/21/20 119/72   12/12/19 131/80       Physical Exam  Constitutional:       General: She is not in acute distress. Appearance: Normal appearance. Abdominal:      Comments: Right upper abs slightly more prominent   Neurological:      Mental Status: She is alert and oriented to person, place, and time. Motor: No tremor. Psychiatric:         Mood and Affect: Mood normal.         Behavior: Behavior normal.         Thought Content:  Thought content normal.         Judgment: Judgment normal.         ASSESSMENT and PLAN    ICD-10-CM ICD-9-CM 1. Essential hypertension  I10 401.9    2. Grief  F43.21 309.0    3. Depressed mood  R45.89 799.29    4. Insomnia, unspecified type  G47.00 780.52    5. Abdominal wall bulge  R19.00 789.30         Blood pressure controlled  Grief and mood improving  Sleeping OK  Continue current plans. Reassured that mild abdominal asymmetry can be normal     Follow-up and Dispositions    · Return in about 6 months (around 1/20/2021) for blood pressure. Reviewed plan of care. Patient has provided input and agrees with goals.

## 2020-07-20 ENCOUNTER — OFFICE VISIT (OUTPATIENT)
Dept: FAMILY MEDICINE CLINIC | Age: 60
End: 2020-07-20

## 2020-07-20 VITALS
WEIGHT: 150 LBS | TEMPERATURE: 97.6 F | SYSTOLIC BLOOD PRESSURE: 116 MMHG | RESPIRATION RATE: 20 BRPM | HEIGHT: 65 IN | BODY MASS INDEX: 24.99 KG/M2 | HEART RATE: 76 BPM | OXYGEN SATURATION: 98 % | DIASTOLIC BLOOD PRESSURE: 72 MMHG

## 2020-07-20 DIAGNOSIS — F43.21 GRIEF: ICD-10-CM

## 2020-07-20 DIAGNOSIS — I10 ESSENTIAL HYPERTENSION: Primary | ICD-10-CM

## 2020-07-20 DIAGNOSIS — R19.00 ABDOMINAL WALL BULGE: ICD-10-CM

## 2020-07-20 DIAGNOSIS — R45.89 DEPRESSED MOOD: ICD-10-CM

## 2020-07-20 DIAGNOSIS — G47.00 INSOMNIA, UNSPECIFIED TYPE: ICD-10-CM

## 2020-07-20 NOTE — PROGRESS NOTES
Chief Complaint   Patient presents with    Hypertension     followup     Possible Hernia     upper abdomen- not pain but sore

## 2020-08-24 ENCOUNTER — OFFICE VISIT (OUTPATIENT)
Dept: FAMILY MEDICINE CLINIC | Age: 60
End: 2020-08-24
Payer: COMMERCIAL

## 2020-08-24 ENCOUNTER — TELEPHONE (OUTPATIENT)
Dept: FAMILY MEDICINE CLINIC | Age: 60
End: 2020-08-24

## 2020-08-24 ENCOUNTER — HOSPITAL ENCOUNTER (OUTPATIENT)
Dept: GENERAL RADIOLOGY | Age: 60
Discharge: HOME | End: 2020-08-24
Payer: COMMERCIAL

## 2020-08-24 VITALS
BODY MASS INDEX: 24.86 KG/M2 | TEMPERATURE: 96.4 F | OXYGEN SATURATION: 100 % | WEIGHT: 149.2 LBS | DIASTOLIC BLOOD PRESSURE: 93 MMHG | HEART RATE: 64 BPM | RESPIRATION RATE: 16 BRPM | HEIGHT: 65 IN | SYSTOLIC BLOOD PRESSURE: 145 MMHG

## 2020-08-24 DIAGNOSIS — M25.552 PAIN IN JOINT OF LEFT HIP: Primary | ICD-10-CM

## 2020-08-24 DIAGNOSIS — M25.552 PAIN IN JOINT OF LEFT HIP: ICD-10-CM

## 2020-08-24 DIAGNOSIS — Q65.89 HIP DYSPLASIA: Primary | ICD-10-CM

## 2020-08-24 PROCEDURE — 99213 OFFICE O/P EST LOW 20 MIN: CPT | Performed by: FAMILY MEDICINE

## 2020-08-24 PROCEDURE — 73502 X-RAY EXAM HIP UNI 2-3 VIEWS: CPT

## 2020-08-24 RX ORDER — NAPROXEN 500 MG/1
500 TABLET ORAL 2 TIMES DAILY WITH MEALS
COMMUNITY
End: 2020-08-24

## 2020-08-24 RX ORDER — DICLOFENAC SODIUM 100 MG/1
100 TABLET, FILM COATED, EXTENDED RELEASE ORAL DAILY
Qty: 30 TAB | Refills: 1 | Status: SHIPPED | OUTPATIENT
Start: 2020-08-24 | End: 2020-12-06 | Stop reason: ALTCHOICE

## 2020-08-24 NOTE — PROGRESS NOTES
Chief Complaint   Patient presents with    Pelvic Pain     Left, Radiates down left leg. 1. Have you been to the ER, urgent care clinic since your last visit? Hospitalized since your last visit? No    2. Have you seen or consulted any other health care providers outside of the [address] [place] [address] since your last visit? Include any pap smears or colon screening.  No

## 2020-08-24 NOTE — PROGRESS NOTES
HISTORY OF PRESENT ILLNESS  Negar Landry is a 60 y.o. female. Patient presents with:  Pelvic Pain: Left, Radiates down left leg. It started several months and is getting worse. Usually it radiates around to the hip and down the lateral thigh to the knee. Nothing makes worse or better. No relief with Naprosyn. Review of Systems   Constitutional: Negative for chills and fever. Musculoskeletal: Negative for back pain. Neurological: Negative for tingling, sensory change and focal weakness. Visit Vitals  BP (!) 145/93 (BP 1 Location: Left arm, BP Patient Position: Sitting)   Pulse 64   Temp (!) 96.4 °F (35.8 °C) (Tympanic)   Resp 16   Ht 5' 5\" (1.651 m)   Wt 149 lb 3.2 oz (67.7 kg)   SpO2 100%   BMI 24.83 kg/m²     BP Readings from Last 3 Encounters:   08/24/20 (!) 145/93   07/20/20 116/72   06/24/20 131/90       Physical Exam  Musculoskeletal:      Left hip: She exhibits decreased range of motion. She exhibits no tenderness, no bony tenderness and no crepitus. Neurological:      Sensory: Sensation is intact. Deep Tendon Reflexes:      Reflex Scores:       Patellar reflexes are 2+ on the right side and 2+ on the left side. ASSESSMENT and PLAN    ICD-10-CM ICD-9-CM    1. Pain in joint of left hip  M25.552 719.45 XR HIP LT W OR WO PELV 2-3 VWS      diclofenac (VOLTAREN XR) 100 mg ER tablet        Suspect osteoarthritis  Hip x-ray  Stop Naprosyn  Voltaren every day  Heat     Follow-up and Dispositions    · Return if symptoms worsen or fail to improve. Reviewed plan of care. Patient has provided input and agrees with goals.

## 2020-08-25 NOTE — TELEPHONE ENCOUNTER
Patient returned call to office and will  referral at the  instead. Patient verbalized understanding of X-ray findings per Dr Jerrica Morrissey.

## 2020-08-25 NOTE — TELEPHONE ENCOUNTER
Please call patient and let her know her x-ray shows the the bone in her hip joint is partially uncovered. I would like for her to see Orthopedics and have printed a referral request..

## 2020-08-25 NOTE — TELEPHONE ENCOUNTER
Attempted to contact patient at number on file, no answer, left a message for patient to return call to office. Per Dr Nedra Gutiérrez her x-ray shows the the bone in her hip joint is partially uncovered. I would like for her to see Orthopedics and have printed a referral request.. Referral mailed 08/25/2020.

## 2020-10-14 ENCOUNTER — OFFICE VISIT (OUTPATIENT)
Dept: FAMILY MEDICINE CLINIC | Age: 60
End: 2020-10-14
Payer: COMMERCIAL

## 2020-10-14 VITALS
TEMPERATURE: 97.4 F | HEIGHT: 65 IN | BODY MASS INDEX: 24.49 KG/M2 | WEIGHT: 147 LBS | RESPIRATION RATE: 20 BRPM | SYSTOLIC BLOOD PRESSURE: 130 MMHG | DIASTOLIC BLOOD PRESSURE: 86 MMHG | OXYGEN SATURATION: 100 % | HEART RATE: 67 BPM

## 2020-10-14 DIAGNOSIS — G47.00 INSOMNIA, UNSPECIFIED TYPE: ICD-10-CM

## 2020-10-14 DIAGNOSIS — M79.2 NEUROPATHIC PAIN: ICD-10-CM

## 2020-10-14 DIAGNOSIS — B02.9 HERPES ZOSTER WITHOUT COMPLICATION: Primary | ICD-10-CM

## 2020-10-14 DIAGNOSIS — F43.21 SITUATIONAL DEPRESSION: ICD-10-CM

## 2020-10-14 PROCEDURE — 99213 OFFICE O/P EST LOW 20 MIN: CPT | Performed by: FAMILY MEDICINE

## 2020-10-14 RX ORDER — VALACYCLOVIR HYDROCHLORIDE 1 G/1
1000 TABLET, FILM COATED ORAL EVERY 8 HOURS
Qty: 21 TAB | Refills: 0 | Status: SHIPPED | OUTPATIENT
Start: 2020-10-14 | End: 2020-10-21

## 2020-10-14 RX ORDER — GABAPENTIN 100 MG/1
100 CAPSULE ORAL
Qty: 30 CAP | Refills: 1 | Status: SHIPPED | OUTPATIENT
Start: 2020-10-14 | End: 2021-01-13

## 2020-10-14 RX ORDER — TRAZODONE HYDROCHLORIDE 100 MG/1
100 TABLET ORAL
Qty: 30 TAB | Refills: 0 | Status: SHIPPED | OUTPATIENT
Start: 2020-10-14 | End: 2021-01-13

## 2020-10-14 NOTE — PROGRESS NOTES
Chief Complaint   Patient presents with    Rash     blispers under breast - pain and burning,and itchy

## 2020-10-14 NOTE — PATIENT INSTRUCTIONS

## 2020-10-14 NOTE — PROGRESS NOTES
HISTORY OF PRESENT ILLNESS  Roshan Monahan is a 63 y.o. female. Patient presents with:  Rash: blispers under breast - pain and burning,and itchy    The rash has been present for two weeks and is getting worse, however, the burning is getting better. Wearing her bra makes it worse. Her friend gave her some fluocinonide cream which helped some. She has some new lesions. Also, she has had insomnia since her  passed in May and the trazodone only works for several hours. No history of depression, but her son has bipolar. Review of Systems   Constitutional: Negative for malaise/fatigue and weight loss. No weight gain   Respiratory: Negative for shortness of breath. Cardiovascular: Negative for chest pain and palpitations. Skin: Positive for itching and rash. Psychiatric/Behavioral: Positive for depression. Negative for hallucinations, substance abuse and suicidal ideas. The patient is nervous/anxious and has insomnia. She does not enjoy life       Visit Vitals  /86   Pulse 67   Temp 97.4 °F (36.3 °C) (Temporal)   Resp 20   Ht 5' 5\" (1.651 m)   Wt 147 lb (66.7 kg)   SpO2 100%   BMI 24.46 kg/m²       Physical Exam  Constitutional:       General: She is not in acute distress. Appearance: Normal appearance. Skin:         Neurological:      Mental Status: She is alert and oriented to person, place, and time. Motor: No tremor. Psychiatric:         Mood and Affect: Mood normal.         Behavior: Behavior normal.         Thought Content: Thought content normal.         Judgment: Judgment normal.       MDQ - negative for bipolar    ASSESSMENT and PLAN    ICD-10-CM ICD-9-CM    1. Herpes zoster without complication  W74.0 978.7 valACYclovir (Valtrex) 1 gram tablet   2. Neuropathic pain  M79.2 729.2 gabapentin (NEURONTIN) 100 mg capsule   3. Situational depression  F43.21 309.0 REFERRAL TO PSYCHOLOGY   4.  Insomnia, unspecified type  G47.00 780.52 traZODone (DESYREL) 100 mg tablet        Start Valtrex  Gabapentin  Obtain counseling  Increase trazodone    Follow-up and Dispositions    · Return if symptoms worsen or fail to improve. Reviewed plan of care. Patient has provided input and agrees with goals.

## 2020-12-01 DIAGNOSIS — M77.42 METATARSALGIA OF LEFT FOOT: ICD-10-CM

## 2020-12-06 RX ORDER — NAPROXEN 500 MG/1
TABLET ORAL
Qty: 30 TAB | Refills: 0 | Status: SHIPPED
Start: 2020-12-06 | End: 2021-07-12

## 2020-12-15 ENCOUNTER — TELEPHONE (OUTPATIENT)
Dept: FAMILY MEDICINE CLINIC | Age: 60
End: 2020-12-15

## 2020-12-15 NOTE — TELEPHONE ENCOUNTER
Pt is scheduled for left hip surgery on 01/21/21. Pt has been scheduled for pre-op on 01/13/21. Forms are at .

## 2021-01-11 DIAGNOSIS — I10 ESSENTIAL HYPERTENSION: ICD-10-CM

## 2021-01-11 RX ORDER — VALSARTAN 80 MG/1
80 TABLET ORAL DAILY
Qty: 90 TAB | Refills: 2 | Status: SHIPPED | OUTPATIENT
Start: 2021-01-11 | End: 2021-10-04

## 2021-01-13 ENCOUNTER — OFFICE VISIT (OUTPATIENT)
Dept: FAMILY MEDICINE CLINIC | Age: 61
End: 2021-01-13
Payer: COMMERCIAL

## 2021-01-13 VITALS
WEIGHT: 150 LBS | HEIGHT: 65 IN | OXYGEN SATURATION: 98 % | BODY MASS INDEX: 24.99 KG/M2 | TEMPERATURE: 96 F | HEART RATE: 73 BPM | DIASTOLIC BLOOD PRESSURE: 86 MMHG | SYSTOLIC BLOOD PRESSURE: 136 MMHG | RESPIRATION RATE: 20 BRPM

## 2021-01-13 DIAGNOSIS — R82.90 ABNORMAL URINALYSIS: ICD-10-CM

## 2021-01-13 DIAGNOSIS — I10 ESSENTIAL HYPERTENSION: ICD-10-CM

## 2021-01-13 DIAGNOSIS — Z01.818 PREOPERATIVE GENERAL PHYSICAL EXAMINATION: Primary | ICD-10-CM

## 2021-01-13 DIAGNOSIS — M16.12 PRIMARY OSTEOARTHRITIS OF LEFT HIP: ICD-10-CM

## 2021-01-13 LAB
BILIRUB UR QL STRIP: NEGATIVE
GLUCOSE UR-MCNC: NEGATIVE MG/DL
KETONES P FAST UR STRIP-MCNC: NEGATIVE MG/DL
PH UR STRIP: 6 [PH] (ref 4.6–8)
PROT UR QL STRIP: NEGATIVE
SP GR UR STRIP: 1.02 (ref 1–1.03)
UA UROBILINOGEN AMB POC: NORMAL (ref 0.2–1)
URINALYSIS CLARITY POC: CLEAR
URINALYSIS COLOR POC: YELLOW
URINE BLOOD POC: NORMAL
URINE LEUKOCYTES POC: NORMAL
URINE NITRITES POC: NEGATIVE

## 2021-01-13 PROCEDURE — 81003 URINALYSIS AUTO W/O SCOPE: CPT | Performed by: FAMILY MEDICINE

## 2021-01-13 PROCEDURE — 99213 OFFICE O/P EST LOW 20 MIN: CPT | Performed by: FAMILY MEDICINE

## 2021-01-13 RX ORDER — AMPICILLIN TRIHYDRATE 250 MG
600 CAPSULE ORAL
COMMUNITY
End: 2021-07-12

## 2021-01-13 NOTE — PROGRESS NOTES
Preoperative Evaluation    Date of Exam: 2021    Aron Lombardi is a 63 y.o. female (:1960) who presents for preoperative evaluation. She will be having a left hip total hip arthroplasty 21. Latex Allergy: no    Problem List:     Patient Active Problem List    Diagnosis Date Noted    Arthritis     Hypertension     Hypercholesterolemia      Medical History:     Past Medical History:   Diagnosis Date    Arthritis     bilateral knees    Hypercholesterolemia     Hypertension      Allergies:   No Known Allergies   Medications:     Current Outpatient Medications   Medication Sig    red yeast rice extract 600 mg cap Take 600 mg by mouth now.  OTHER Digestive enzymes    valsartan (DIOVAN) 80 mg tablet Take 1 Tab by mouth daily.  naproxen (NAPROSYN) 500 mg tablet TAKE ONE TABLET BY MOUTH TWICE A DAY WITH MEALS (WITH FOOD OR MILK)     No current facility-administered medications for this visit. Surgical History:     Past Surgical History:   Procedure Laterality Date    COLONOSCOPY N/A 2020    [facility]    HX ANKLE FRACTURE TX Right     HX KNEE REPLACEMENT Right      Social History:     Social History     Socioeconomic History    Marital status: SINGLE     Spouse name: Not on file    Number of children: Not on file    Years of education: Not on file    Highest education level: Not on file   Tobacco Use    Smoking status: Never Smoker    Smokeless tobacco: Never Used   Substance and Sexual Activity    Alcohol use: Never     Frequency: Never    Drug use: Never    Sexual activity: Yes     Birth control/protection: None       Anesthesia Complications: None  History of abnormal bleeding : None  History of Blood Transfusions: no  Health Care Directive or Living Will: no    Objective:     ROS:   Feeling well. No dyspnea or chest pain on exertion. No abdominal pain, change in bowel habits, black or bloody stools.   No urinary tract symptoms. No neurological complaints. OBJECTIVE:   The patient appears well, alert, oriented x 3, in no distress. Visit Vitals  /86   Pulse 73   Temp (!) 96 °F (35.6 °C) (Temporal)   Resp 20   Ht 5' 5\" (1.651 m)   Wt 150 lb (68 kg)   SpO2 98%   BMI 24.96 kg/m²     HEENT:ENT Neck supple. RAMANDEEP. Chest: Lungs are clear, good air entry, no wheezes, rhonchi or rales. Cardiovascular: S1 and S2 normal, no murmurs, regular rate and rhythm. Abdomen: soft without tenderness, guarding, mass or organomegaly. Extremities: show no edema, normal peripheral pulses. Neurological: is normal, no focal findings. DIAGNOSTICS:   1/18/2021  3:14 PM - [facility], [facility]    Component Value Flag Ref Range Units Status   Color YELLOW/STRAW      Final   Comment:   Color Reference Range: Straw, Yellow or Dark Yellow   Appearance CLEAR   CLEAR   Final   Specific gravity 1.020   1.003 - 1.030   Final   pH (UA) 6.5   5.0 - 8.0   Final   Protein Negative   Negative mg/dL Final   Glucose Negative   Negative mg/dL Final   Ketone Negative   Negative mg/dL Final   Bilirubin Negative   Negative   Final   Blood TRACE  Abnormal   Negative   Final   Urobilinogen 0.2   0.2 - 1.0 EU/dL Final   Nitrites Negative   Negative   Final   Leukocyte Esterase TRACE  Abnormal   Negative   Final   WBC 0-4   0 - 4 /hpf Final   RBC 0-5   0 - 5 /hpf Final   Epithelial cells MODERATE  Abnormal   FEW /lpf Final   Comment:   Epithelial cell category consists of squamous cells and /or transitional   urothelial cells. Renal tubular cells, if present, are separately identified as   such. Bacteria 1+  Abnormal   Negative /hpf Final     Urine Culture - negative                ICD-10-CM ICD-9-CM    1. Preoperative general physical examination  Z01.818 V72.83 AMB POC URINALYSIS DIP STICK AUTO W/O MICRO   2. Primary osteoarthritis of left hip  M16.12 715.15    3. Essential hypertension  I10 401.9    4.  Abnormal urinalysis  R82.90 791.9 URINALYSIS W/MICROSCOPIC      CULTURE, URINE        Blood pressure controlled  Urine culture negative    Follow-up and Dispositions    · Return in about 6 months (around 7/13/2021) for blood pressure. Reviewed plan of care. Patient has provided input and agrees with goals.               IMPRESSION:   Low risk for planned surgery  No contraindications to planned surgery  Pending labs and EKG results    Collin Dorado MD   1/13/2021

## 2021-01-13 NOTE — LETTER
1/13/2021 [address] [facility] [address] [id number] Dear Ms. Mario Mendieta, We had an appointment reserved for you today and were concerned when you did not show or call within 24 hours to cancel the appointment. Our policy is to call patients two days prior to their appointment to remind them of the date and time. We perform these calls as a courtesy to our patients and to allow us the opportunity to rebook the time slot should the appointment not be necessary. Recognizing that everyones time is valuable and that appointment time is limited, we ask that you provide 24 hours notice if you are unable to keep your appointment. Please call us at your earliest convenience to reschedule your appointment as your provider felt it was important to see you. Thank you for your anticipated cooperation. The scheduling staff: [address] [place] [address] 
[phone]

## 2021-01-13 NOTE — PROGRESS NOTES
Chief Complaint   Patient presents with    Pre-op Exam     Left hip replacement     Labs     Will be completed at [facility] tomorrow- PCP to complete urinalysis

## 2021-01-18 LAB
APPEARANCE UR: CLEAR
BACTERIA SPEC CULT: NORMAL
BACTERIA URNS QL MICRO: ABNORMAL /HPF
BILIRUB UR QL: NEGATIVE
COLOR UR: ABNORMAL
EPITH CASTS URNS QL MICRO: ABNORMAL /LPF
GLUCOSE UR STRIP.AUTO-MCNC: NEGATIVE MG/DL
HGB UR QL STRIP: ABNORMAL
KETONES UR QL STRIP.AUTO: NEGATIVE MG/DL
LEUKOCYTE ESTERASE UR QL STRIP.AUTO: ABNORMAL
NITRITE UR QL STRIP.AUTO: NEGATIVE
PH UR STRIP: 6.5 [PH] (ref 5–8)
PROT UR STRIP-MCNC: NEGATIVE MG/DL
RBC #/AREA URNS HPF: ABNORMAL /HPF (ref 0–5)
SERVICE CMNT-IMP: NORMAL
SP GR UR REFRACTOMETRY: 1.02 (ref 1–1.03)
UROBILINOGEN UR QL STRIP.AUTO: 0.2 EU/DL (ref 0.2–1)
WBC URNS QL MICRO: ABNORMAL /HPF (ref 0–4)

## 2021-01-20 ENCOUNTER — TELEPHONE (OUTPATIENT)
Dept: FAMILY MEDICINE CLINIC | Age: 61
End: 2021-01-20

## 2021-01-20 NOTE — TELEPHONE ENCOUNTER
----- Message from Toshia Byrd sent at 1/20/2021  2:01 PM EST -----  Regarding: Medication Question  Contact: [phone]  General Message/Vendor Calls    Caller's first and last name:NA        Reason for call: Requesting to know if patient needs to discontinue taking BP medication prior to hip replacement surgery on 01/27/21. Callback required yes/no and why: Yes, discussion of medication for upcoming hip replacement surgery.        Best contact number(s): [phone]      Details to clarify the request: MOUNIKA Byrd

## 2021-01-21 ENCOUNTER — TELEPHONE (OUTPATIENT)
Dept: FAMILY MEDICINE CLINIC | Age: 61
End: 2021-01-21

## 2021-01-21 DIAGNOSIS — B18.1 CHRONIC HEPATITIS B (HCC): Primary | ICD-10-CM

## 2021-01-21 NOTE — TELEPHONE ENCOUNTER
Phone call with Dr. Brenda Ivan. She is having a THR and her labs came back with a positive hep B antigen. In questioning her, he found out that she has chronic hepatitis B. They will go ahead with the hip replacement, but he wanted to let me know. Thanked him and said I would get in touch with the patient.

## 2021-01-21 NOTE — TELEPHONE ENCOUNTER
Dr Darwin Alfaro from [facility] would like to speak with Dr Alycia Monte regarding patient having a positive Hep B antigen test. Please call [phone]

## 2021-01-21 NOTE — TELEPHONE ENCOUNTER
Please call patient and let her know that I spoke with her orthopedist and he told me he had chronic hepatitis B. I would like for to get lab work before her surgery and have printed lab order.

## 2021-01-21 NOTE — TELEPHONE ENCOUNTER
----- Message from Willis Sarabia sent at 1/21/2021 10:34 AM EST -----  Regarding: Dr. Vianey Aguilar Message/Vendor Calls    Caller's first and last name:  Tom Baca RN, [facility], Dr. Pa Dozier, Surgeon      Reason for call:  Requesting last office notes      Callback required yes/no and why:  Yes, confirming information was faxed      Best contact number(s):  [phone]      Details to clarify the request:  Pt is scheduled to undergo total hip replacement on 01/27/21      Willis Sarabia

## 2021-01-21 NOTE — TELEPHONE ENCOUNTER
Pre-op exam and note was faxed 2 days ago, confirmation received. Working at home today, will fax again tomorrow.

## 2021-01-22 NOTE — TELEPHONE ENCOUNTER
Called and spoke with pt, and she has been advised and states understanding of this and agrees with plan. Pt will go today to have labs drawn.

## 2021-04-01 NOTE — COMMUNICATION BODY
HISTORY OF PRESENT ILLNESS  Joe Lin is a 60 y.o. female. HPI  NEW patient referral for consultation by Dr. Phillip Stapleton for a palpable LEFT breast lump. The patient had breast imaging done today. She denies any nipple inversion or discharge. OB History    None       Obstetric Comments   Menarche  LMP unsure, # of children 2, age of 1st delivery 31 Hysterectomy/oophorectomy no/no, Breast bx none  history of breast feeding no, BCP yes, Hormone therapy none             [facility] Results (most recent):       Results from [place] encounter on 07/10/19   [facility] MAMMO BI DX INCL CAD     Narrative STUDY:  Bilateral Diagnostic Digital Mammography with left breast ultrasound     INDICATION: Palpable abnormality in the left breast     COMPARISON:  New baseline study     BREAST COMPOSITION: The breasts are extremely dense, which lowers the  sensitivity of mammography.     FINDINGS: Bilateral digital diagnostic mammography was performed, and is  interpreted in conjunction with a computer assisted detection (CAD) system. Spot  compression views were performed of the region of palpable concern, the  periareolar left breast at approximately 2:00. There are no suspicious findings  within either breast. There is no skin thickening or nipple retraction.     Left breast ultrasound was performed of the region of palpable concern, the  lateral aspect of the areola at approximately 2:00. There is a 4 mm Hardy  gland cyst, which accounts for the region of palpable concern. There are no  suspicious lesions.        Impression IMPRESSION:     1. BI-RADS Assessment Category 2: Benign finding. 4 mm Hardy gland cyst in  the lateral aspect of the left areola, explaining the region of palpable  concern.   2. No mammographic evidence of malignancy within either breast.  3. Left breast ultrasound confirms these benign findings.     Recommend annual screening mammography.     The patient has been notified of these results and recommendations. No family history of breast or ovarian cancer       Past Medical History:   Diagnosis Date    Arthritis     bilateral knees    Hypercholesterolemia     Hypertension        Past Surgical History:   Procedure Laterality Date    HX ANKLE FRACTURE TX Right     HX KNEE REPLACEMENT Right        Social History     Socioeconomic History    Marital status: SINGLE     Spouse name: Not on file    Number of children: Not on file    Years of education: Not on file    Highest education level: Not on file   Occupational History    Not on file   Social Needs    Financial resource strain: Not on file    Food insecurity:     Worry: Not on file     Inability: Not on file    Transportation needs:     Medical: Not on file     Non-medical: Not on file   Tobacco Use    Smoking status: Never Smoker    Smokeless tobacco: Never Used   Substance and Sexual Activity    Alcohol use: Never     Frequency: Never    Drug use: Never    Sexual activity: Yes     Birth control/protection: None   Lifestyle    Physical activity:     Days per week: Not on file     Minutes per session: Not on file    Stress: Not on file   Relationships    Social connections:     Talks on phone: Not on file     Gets together: Not on file     Attends Faith service: Not on file     Active member of club or organization: Not on file     Attends meetings of clubs or organizations: Not on file     Relationship status: Not on file    Intimate partner violence:     Fear of current or ex partner: Not on file     Emotionally abused: Not on file     Physically abused: Not on file     Forced sexual activity: Not on file   Other Topics Concern    Not on file   Social History Narrative    Not on file       Current Outpatient Medications on File Prior to Visit   Medication Sig Dispense Refill    amLODIPine (NORVASC) 5 mg tablet Take 1 Tab by mouth daily. 30 Tab 1     No current facility-administered medications on file prior to visit. No Known Allergies    OB History    None      Obstetric Comments   Menarche  LMP unsure, # of children 2, age of 1st delivery 31 Hysterectomy/oophorectomy no/no, Breast bx none  history of breast feeding no, BCP yes, Hormone therapy none             ROS  Constitutional: Negative. HENT: Negative. Eyes: Negative. Respiratory: Negative. Cardiovascular: Negative. Gastrointestinal: Negative. Genitourinary: Negative. Musculoskeletal: Negative. Skin: Negative. Neurological: Negative. Endo/Heme/Allergies: Negative. Psychiatric/Behavioral: Negative. Physical Exam   Cardiovascular: Normal rate, regular rhythm and normal heart sounds. Pulmonary/Chest: Breath sounds normal. Right breast exhibits no inverted nipple, no mass, no nipple discharge, no skin change and no tenderness. Left breast exhibits no inverted nipple, no mass, no nipple discharge, no skin change and no tenderness. Breasts are symmetrical.       Lymphadenopathy:     She has no cervical adenopathy. Right cervical: No superficial cervical, no deep cervical and no posterior cervical adenopathy present. Left cervical: No superficial cervical, no deep cervical and no posterior cervical adenopathy present. She has no axillary adenopathy. Right axillary: No pectoral and no lateral adenopathy present. Left axillary: No pectoral and no lateral adenopathy present. ASSESSMENT and PLAN    ICD-10-CM ICD-9-CM    1. Fibrocystic breast, left N60.12 610.1       New patient presents for evaluation of LEFT breast mass, and is doing well overall. Palpable arthur gland at 4:00 retroareolar. Reviewed mammogram results. Pt to continue annual mammography. F/U PRN. This plan was reviewed with the patient and patient agrees. All questions were answered.     Written by Cody Church, as dictated by Dr. Mo García MD. moderate

## 2021-04-13 ENCOUNTER — TELEPHONE (OUTPATIENT)
Dept: FAMILY MEDICINE CLINIC | Age: 61
End: 2021-04-13

## 2021-04-13 NOTE — TELEPHONE ENCOUNTER
Kriss Cisneros from [facility] needs the last BP reading on pt from 2020.  She will fax a request. States you can call or fax info back to [phone]  Phone [phone]

## 2021-04-13 NOTE — TELEPHONE ENCOUNTER
Documentation received from [facility] requesting BP reading for pt and another pt in the practice.  Georgem

## 2021-04-13 NOTE — TELEPHONE ENCOUNTER
Pt called requesting assistance with scheduling covid vaccine in [place] due to recent hip surgery and does not want to travel too far.  Chalo

## 2021-04-24 ENCOUNTER — IMMUNIZATION (OUTPATIENT)
Dept: INTERNAL MEDICINE CLINIC | Age: 61
End: 2021-04-24
Payer: COMMERCIAL

## 2021-04-24 DIAGNOSIS — Z23 ENCOUNTER FOR IMMUNIZATION: Primary | ICD-10-CM

## 2021-04-24 PROCEDURE — 91300 COVID-19, MRNA, LNP-S, PF, 30MCG/0.3ML DOSE(PFIZER): CPT | Performed by: FAMILY MEDICINE

## 2021-04-24 PROCEDURE — 0001A COVID-19, MRNA, LNP-S, PF, 30MCG/0.3ML DOSE(PFIZER): CPT | Performed by: FAMILY MEDICINE

## 2021-05-15 ENCOUNTER — IMMUNIZATION (OUTPATIENT)
Dept: INTERNAL MEDICINE CLINIC | Age: 61
End: 2021-05-15
Payer: COMMERCIAL

## 2021-05-15 DIAGNOSIS — Z23 ENCOUNTER FOR IMMUNIZATION: Primary | ICD-10-CM

## 2021-05-15 PROCEDURE — 0002A COVID-19, MRNA, LNP-S, PF, 30MCG/0.3ML DOSE(PFIZER): CPT | Performed by: FAMILY MEDICINE

## 2021-05-15 PROCEDURE — 91300 COVID-19, MRNA, LNP-S, PF, 30MCG/0.3ML DOSE(PFIZER): CPT | Performed by: FAMILY MEDICINE

## 2021-06-08 ENCOUNTER — TRANSCRIBE ORDER (OUTPATIENT)
Dept: SCHEDULING | Age: 61
End: 2021-06-08

## 2021-06-08 DIAGNOSIS — Z12.31 SCREENING MAMMOGRAM FOR HIGH-RISK PATIENT: Primary | ICD-10-CM

## 2021-06-25 ENCOUNTER — HOSPITAL ENCOUNTER (OUTPATIENT)
Dept: MAMMOGRAPHY | Age: 61
Discharge: HOME | End: 2021-06-25
Attending: FAMILY MEDICINE
Payer: COMMERCIAL

## 2021-06-25 ENCOUNTER — TELEPHONE (OUTPATIENT)
Dept: FAMILY MEDICINE CLINIC | Age: 61
End: 2021-06-25

## 2021-06-25 DIAGNOSIS — Z12.31 SCREENING MAMMOGRAM FOR HIGH-RISK PATIENT: ICD-10-CM

## 2021-06-25 PROCEDURE — 77067 SCR MAMMO BI INCL CAD: CPT

## 2021-07-12 ENCOUNTER — OFFICE VISIT (OUTPATIENT)
Dept: FAMILY MEDICINE CLINIC | Age: 61
End: 2021-07-12
Payer: COMMERCIAL

## 2021-07-12 VITALS
OXYGEN SATURATION: 97 % | SYSTOLIC BLOOD PRESSURE: 130 MMHG | HEIGHT: 65 IN | DIASTOLIC BLOOD PRESSURE: 80 MMHG | HEART RATE: 90 BPM | RESPIRATION RATE: 16 BRPM | TEMPERATURE: 98.5 F | WEIGHT: 148 LBS | BODY MASS INDEX: 24.66 KG/M2

## 2021-07-12 DIAGNOSIS — I10 ESSENTIAL HYPERTENSION: Primary | ICD-10-CM

## 2021-07-12 DIAGNOSIS — S86.911A KNEE STRAIN, RIGHT, INITIAL ENCOUNTER: ICD-10-CM

## 2021-07-12 DIAGNOSIS — B18.1 CHRONIC HEPATITIS B (HCC): ICD-10-CM

## 2021-07-12 PROCEDURE — 99214 OFFICE O/P EST MOD 30 MIN: CPT | Performed by: FAMILY MEDICINE

## 2021-07-12 RX ORDER — BENZONATATE 100 MG/1
CAPSULE ORAL
COMMUNITY
Start: 2021-07-09 | End: 2021-07-12

## 2021-07-12 RX ORDER — IBUPROFEN 600 MG/1
600 TABLET ORAL
Qty: 30 TABLET | Refills: 0 | Status: SHIPPED | OUTPATIENT
Start: 2021-07-12 | End: 2021-12-01

## 2021-07-12 NOTE — PROGRESS NOTES
HISTORY OF PRESENT ILLNESS  Karla Delgado is a 63 y.o. female. Patient presents with: Follow-up: htn med compliant bp controlled. would like to be seen for R knee pain as well     Her knee has been hurting for 2 weeks and is getting worse. It started after walking her dog and then, a week later, missing a step and coming down on the leg wrong. No locking or giving way. She has an Orthopedics appointment in 4 days. There is a history of previous knee surgery in [facility]. Review of Systems   Eyes: Negative for blurred vision. Respiratory: Negative for shortness of breath. Cardiovascular: Negative for chest pain. Musculoskeletal: Positive for joint pain. Knee swelling   Skin:        No knee redness or warmth   Neurological: Negative for dizziness, sensory change, speech change, focal weakness and headaches. Visit Vitals  /80   Pulse 90   Temp 98.5 °F (36.9 °C) (Oral)   Resp 16   Ht 5' 5\" (1.651 m)   Wt 148 lb (67.1 kg)   SpO2 97%   BMI 24.63 kg/m²     Physical Exam  Vitals and nursing note reviewed. Constitutional:       General: She is not in acute distress. Appearance: She is well-developed. She is not diaphoretic. Cardiovascular:      Rate and Rhythm: Normal rate and regular rhythm. Heart sounds: Normal heart sounds. No murmur heard. No friction rub. No gallop. Pulmonary:      Effort: Pulmonary effort is normal. No respiratory distress. Breath sounds: Normal breath sounds. No wheezing or rales. Skin:     General: Skin is warm and dry. Neurological:      Mental Status: She is alert and oriented to person, place, and time. ASSESSMENT and PLAN    ICD-10-CM ICD-9-CM    1. Essential hypertension  J12 103.7 METABOLIC PANEL, BASIC   2. Knee strain, right, initial encounter  S86.911A 844.9 ibuprofen (MOTRIN) 600 mg tablet   3.  Chronic hepatitis B (HCC)  B18.1 070.32 HEPATIC FUNCTION PANEL      REFERRAL TO LIVER HEPATOLOGY        Blood pressure controlled  Labs per orders. Motrin, ice prn  Hepatology referral    Follow-up and Dispositions    · Return in about 6 months (around 1/12/2022) for blood pressure. Reviewed plan of care. Patient has provided input and agrees with goals.

## 2021-07-12 NOTE — PROGRESS NOTES
Chief Complaint   Patient presents with    Follow-up     htn med compliant bp controlled.  would like to be seen for R knee pain as well

## 2021-09-10 ENCOUNTER — TELEPHONE (OUTPATIENT)
Dept: FAMILY MEDICINE CLINIC | Age: 61
End: 2021-09-10

## 2021-09-10 NOTE — TELEPHONE ENCOUNTER
----- Message from [facility] sent at 9/10/2021  3:07 PM EDT -----  Regarding: / telephone  General Message/Vendor Calls    Caller's first and last name: Jermaine Lyon      Reason for call: Wants to know if  perform PCR testing      Callback required yes/no and why:Y      Best contact number(s): [phone]      Details to clarify the request: Pt leaving on 10/08, so she will need testing done 3 days before trip. Estella Engel 09/10/2021.

## 2021-09-10 NOTE — TELEPHONE ENCOUNTER
----- Message from [facility] sent at 9/10/2021  3:07 PM EDT -----  Regarding: / telephone  General Message/Vendor Calls    Caller's first and last name: Laila Whitman      Reason for call: Wants to know if  perform PCR testing      Callback required yes/no and why:Y      Best contact number(s): [phone]      Details to clarify the request: Pt leaving on 10/08, so she will need testing done 3 days before trip. Kenroy Engel 09/10/2021.

## 2021-09-15 NOTE — TELEPHONE ENCOUNTER
Called and spoke with pt and she states she will call [employer] or [facility] for PCR testing. Pt states she need records back with in 3 days, and did not want to risk not having results back in time. Advised pt our results can take 3-5 days to receive back.

## 2021-09-21 ENCOUNTER — OFFICE VISIT (OUTPATIENT)
Dept: HEMATOLOGY | Age: 61
End: 2021-09-21
Payer: COMMERCIAL

## 2021-09-21 VITALS
WEIGHT: 146.8 LBS | OXYGEN SATURATION: 99 % | HEIGHT: 65 IN | RESPIRATION RATE: 16 BRPM | TEMPERATURE: 96.6 F | DIASTOLIC BLOOD PRESSURE: 89 MMHG | HEART RATE: 69 BPM | BODY MASS INDEX: 24.46 KG/M2 | SYSTOLIC BLOOD PRESSURE: 130 MMHG

## 2021-09-21 DIAGNOSIS — B18.1 CHRONIC HEPATITIS B (HCC): Primary | ICD-10-CM

## 2021-09-21 PROBLEM — Z96.649 HISTORY OF HIP REPLACEMENT: Status: ACTIVE | Noted: 2021-09-21

## 2021-09-21 PROBLEM — Z96.651 HISTORY OF TOTAL RIGHT KNEE REPLACEMENT: Status: ACTIVE | Noted: 2021-09-21

## 2021-09-21 LAB
ALBUMIN SERPL-MCNC: 4.1 G/DL (ref 3.5–5)
ALBUMIN/GLOB SERPL: 1.2 {RATIO} (ref 1.1–2.2)
ALP SERPL-CCNC: 111 U/L (ref 45–117)
ALT SERPL-CCNC: 24 U/L (ref 12–78)
ANION GAP SERPL CALC-SCNC: 5 MMOL/L (ref 5–15)
AST SERPL-CCNC: 21 U/L (ref 15–37)
BASOPHILS # BLD: 0 K/UL (ref 0–0.1)
BASOPHILS NFR BLD: 1 % (ref 0–1)
BILIRUB DIRECT SERPL-MCNC: 0.2 MG/DL (ref 0–0.2)
BILIRUB SERPL-MCNC: 0.7 MG/DL (ref 0.2–1)
BUN SERPL-MCNC: 14 MG/DL (ref 6–20)
BUN/CREAT SERPL: 22 (ref 12–20)
CALCIUM SERPL-MCNC: 8.9 MG/DL (ref 8.5–10.1)
CHLORIDE SERPL-SCNC: 108 MMOL/L (ref 97–108)
CO2 SERPL-SCNC: 27 MMOL/L (ref 21–32)
CREAT SERPL-MCNC: 0.64 MG/DL (ref 0.55–1.02)
DIFFERENTIAL METHOD BLD: NORMAL
EOSINOPHIL # BLD: 0.1 K/UL (ref 0–0.4)
EOSINOPHIL NFR BLD: 1 % (ref 0–7)
ERYTHROCYTE [DISTWIDTH] IN BLOOD BY AUTOMATED COUNT: 12.8 % (ref 11.5–14.5)
GLOBULIN SER CALC-MCNC: 3.4 G/DL (ref 2–4)
GLUCOSE SERPL-MCNC: 82 MG/DL (ref 65–100)
HBV SURFACE AB SER QL: NONREACTIVE
HBV SURFACE AB SER-ACNC: <3.1 MIU/ML
HBV SURFACE AG SER QL: 441.14 INDEX
HBV SURFACE AG SER QL: POSITIVE
HCT VFR BLD AUTO: 46.6 % (ref 35–47)
HGB BLD-MCNC: 15.3 G/DL (ref 11.5–16)
IMM GRANULOCYTES # BLD AUTO: 0 K/UL (ref 0–0.04)
IMM GRANULOCYTES NFR BLD AUTO: 0 % (ref 0–0.5)
LYMPHOCYTES # BLD: 1.7 K/UL (ref 0.8–3.5)
LYMPHOCYTES NFR BLD: 39 % (ref 12–49)
MCH RBC QN AUTO: 29.9 PG (ref 26–34)
MCHC RBC AUTO-ENTMCNC: 32.8 G/DL (ref 30–36.5)
MCV RBC AUTO: 91.2 FL (ref 80–99)
MONOCYTES # BLD: 0.3 K/UL (ref 0–1)
MONOCYTES NFR BLD: 7 % (ref 5–13)
NEUTS SEG # BLD: 2.2 K/UL (ref 1.8–8)
NEUTS SEG NFR BLD: 52 % (ref 32–75)
NRBC # BLD: 0 K/UL (ref 0–0.01)
NRBC BLD-RTO: 0 PER 100 WBC
PLATELET # BLD AUTO: 238 K/UL (ref 150–400)
PMV BLD AUTO: 9.5 FL (ref 8.9–12.9)
POTASSIUM SERPL-SCNC: 4.2 MMOL/L (ref 3.5–5.1)
PROT SERPL-MCNC: 7.5 G/DL (ref 6.4–8.2)
RBC # BLD AUTO: 5.11 M/UL (ref 3.8–5.2)
SODIUM SERPL-SCNC: 140 MMOL/L (ref 136–145)
WBC # BLD AUTO: 4.3 K/UL (ref 3.6–11)

## 2021-09-21 PROCEDURE — 99204 OFFICE O/P NEW MOD 45 MIN: CPT | Performed by: INTERNAL MEDICINE

## 2021-09-21 RX ORDER — CELECOXIB 200 MG/1
CAPSULE ORAL
COMMUNITY
Start: 2021-09-05 | End: 2021-12-01

## 2021-09-21 NOTE — PROGRESS NOTES
Chief Complaint   Patient presents with    New Patient     1. Have you been to the ER, urgent care clinic since your last visit? Hospitalized since your last visit? No    2. Have you seen or consulted any other health care providers outside of the [address] [place] [address] since your last visit? Include any pap smears or colon screening.  No     Visit Vitals  /89 (BP 1 Location: Right arm, BP Patient Position: Sitting, BP Cuff Size: Adult)   Pulse 69   Temp (!) 96.6 °F (35.9 °C) (Temporal)   Resp 16   Ht 5' 5\" (1.651 m)   Wt 146 lb 12.8 oz (66.6 kg)   SpO2 99%   BMI 24.43 kg/m²

## 2021-09-21 NOTE — Clinical Note
10/10/2021    Patient: Angélica Villeda   YOB: 1960   Date of Visit: 9/21/2021     Dorene Casarez, [address] [address] [address]  Via In [facility] [address]    Dear Dorene Casarez MD,      Thank you for referring Ms. Marlyn Lemus to [address] for evaluation. My notes for this consultation are attached. If you have questions, please do not hesitate to call me. I look forward to following your patient along with you.       Sincerely,    Tanvi Allan MD

## 2021-09-21 NOTE — PROGRESS NOTES
[address] [facility]      Keiry Vogel MD, Sindi Campa, Dwight Flores MD, MPH      Rox Nuno, PA-NAMRATA Devi, [facility]     Saranya Valencia, [facility]   Monica Booth, [facility]    Christine Toro, [facility]       Emma Issa Alli [address]    at [address], [facility] Victor M Poole  22.    [phone]    FAX: [address] [place] [address] Drive, [address], [address]    [phone]    FAX: [phone]       Patient Care Team:  Merced Coreas MD as PCP - General (Family Medicine)  Merced Coreas MD as PCP - [facility] Provider  Evan Jorgensen MD (Breast Surgery)      Problem List  Date Reviewed: 9/21/2021        Codes Class Noted    History of hip replacement ICD-10-CM: Z96.649  ICD-9-CM: V43.64  9/21/2021        History of total right knee replacement ICD-10-CM: Z96.651  ICD-9-CM: V43.65  9/21/2021        Chronic hepatitis B ([facility] ICD-10-CM: B18.1  ICD-9-CM: 070.32  1/21/2021        Arthritis ICD-10-CM: M19.90  ICD-9-CM: 716.90  Unknown    Overview Signed 6/13/2019  2:18 PM by Merced Coreas MD     bilateral knees             Hypertension ICD-10-CM: N56  ICD-9-CM: 401.9  Unknown              The clinicians listed above have asked me to see Judith Valentin in consultation regarding chronic HBV and its management. All medical records sent by the referring physicians were reviewed     The patient is a 57y.o. year old  female who has tested positive for HBV in 1980s. Risk factors for acquiring HBV are immigration from [place]. There was no history of acute icteric hepatitis     Imaging of the liver has not been performed. An assessment of liver fibrosis with biopsy or elastography has not been performed.       The patient has never received treatment for chronic HBV. The patient does not have any symptoms which could be attributed to the liver disorder. The patient is not experiencing the following symptoms which are commonly seen in this liver disorder:   fatigue,   pain in the right side over the liver,     The patient completes all daily activities without any functional limitations. ASSESSMENT AND PLAN:  Chronic HBV  This is E-antigen negative inactive HBV. The degree of fibrosis has not yet been determined. Liver transaminases are normal.  The ALP is normal.  Liver function is normal.  The platelet count is   normal.      Based upon laboratory studies and imaging the patient does not appear to have significant liver linjury    The level of HBV DNA is <10. The patient is not currently receiving treatment for HBV. The patient does not require treatment for HBV because this is inactive disease. Will perform serologic studies for HCV. This was negative. Will perform imaging of the liver with ultrasound. The need to perform an assessment of liver fibrosis was discussed with the patient. The Fibroscan can assess liver fibrosis and determine if a patient has advanced fibrosis or cirrhosis without the need for liver biopsy. This will be performed at the next office visit. The Fibroscan can be repeated annually or as often as clinically indicated to assess for fibrosis progression and/or regression. Liver Cyst  The patient has several large liver cysts   One of the cysts has internal echos suggesting tis may have been 2 smaller cysts which fused and then then had some internal bleeding. Liver cysts are common and benign over 99% of the time. They rarely grow over time but even if they do this is slow and an increase in size of the cyst over many years does not suggest malignancy.   The most common reason for suspected growth is that the measurements of cyst diameter where not taken from the same location in the cyst or that abutting cysts have fused. Cysts are not responsive to hormonal therapy and HRT or OCH do not need to be stopped. Because one cyst has internal echos and the patient has HBV and at [address] for [facility]. will perform MRI to further characterize the cysts. .    Will measure the following serologic cancer markers AFP. This was normal.    Will check CA 19-9 and CEA at the next appointment. The patient is asymptomatic     Screening for Hepatocellular Carcinoma  HCC screening in patients with HBV should be performed with ultrasound on an annual basis in patients with inactive HBV who are under the age of 48 years. Over the age of 48 years [place] [address]. screening with ultrasound should be performed every 6 months. Screening for Hepatocellular Carcinoma  HCC screening with ultrasound demonstrates several cysts one of which has internal echos. Will perform dynamic MRI to further characterize the lesion and help determine if this is HCC. Treatment of other medical problems in patients with chronic liver disease  There are no contraindications for the patient to take most medications that are necessary for treatment of other medical issues. Normal doses of acetaminophen, as recommended on the label of the bottle, are not hepatotoxic except in the setting of daily alcohol use, even in patients with cirrhosis and can be utilized for pain. Counseling for alcohol in patients with chronic liver disease  The patient was counseled regarding alcohol consumption and the effect of alcohol on chronic liver disease. The patient does not consume any significant amount of alcohol. Vaccinations   Vaccination for viral hepatitis A is recommended since the patient has no serologic evidence of previous exposure or vaccination with immunity. The patient has received 2 doses of COVID-19 vaccine. Routine vaccinations against other bacterial and viral agents can be performed as indicated.   Annual flu vaccination should be administered if indicated. ALLERGIES  No Known Allergies    MEDICATIONS  Current Outpatient Medications   Medication Sig    celecoxib (CELEBREX) 200 mg capsule TAKE 1 CAPSULE BY MOUTH EVERY DAY WITH FOOD    ibuprofen (MOTRIN) 600 mg tablet Take 1 Tablet by mouth every six (6) hours as needed for Pain.  valsartan (DIOVAN) 80 mg tablet Take 1 Tab by mouth daily. No current facility-administered medications for this visit. SYSTEM REVIEW NOT RELATED TO LIVER DISEASE OR REVIEWED ABOVE:  Constitution systems: Negative for fever, chills, weight gain, weight loss. Eyes: Negative for visual changes. ENT: Negative for sore throat, painful swallowing. Respiratory: Negative for cough, hemoptysis, SOB. Cardiology: Negative for chest pain, palpitations. GI:  Negative for constipation or diarrhea. : Negative for urinary frequency, dysuria, hematuria, nocturia. Skin: Negative for rash. Hematology: Negative for easy bruising, blood clots. Musculo-skelatal: Negative for back pain, muscle pain, weakness. Neurologic: Negative for headaches, dizziness, vertigo, memory problems not related to HE. Psychology: Negative for anxiety, depression. FAMILY HISTORY:  There is no family history of liver disease. SOCIAL HISTORY:  The patient is . The patient has 2 children,   The children have been vaccinated for HBV. The patient has never used tobacco products. The patient has never consumed significant amounts of alcohol. The patient used to work in [employer]. The patient has not worked since 2013. PHYSICAL EXAMINATION:  Visit Vitals  /89 (BP 1 Location: Right arm, BP Patient Position: Sitting, BP Cuff Size: Adult)   Pulse 69   Temp (!) 96.6 °F (35.9 °C) (Temporal)   Resp 16   Ht 5' 5\" (1.651 m)   Wt 146 lb 12.8 oz (66.6 kg)   SpO2 99%   BMI 24.43 kg/m²     General: No acute distress. Eyes: Sclera anicteric. ENT: No oral lesions.   Thyroid normal.  Nodes: No adenopathy. Skin: No spider angiomata. No jaundice. No palmar erythema. Respiratory: Lungs clear to auscultation. Cardiovascular: Regular heart rate. No murmurs. No JVD. Abdomen: Soft non-tender. Liver size normal to percussion/palpation. Spleen not palpable. No obvious ascites. Extremities: No edema. No muscle wasting. No gross arthritic changes. Neurologic: Alert and oriented. Cranial nerves grossly intact. No asterixis. LABORATORY STUDIES:  Liver [place] of [address] Units 9/21/2021 7/12/2021   WBC 3.6 - 11.0 K/uL 4.3    ANC 1.8 - 8.0 K/UL 2.2    HGB 11.5 - 16.0 g/dL 15.3     - 400 K/uL 238    AST 15 - 37 U/L 21 17   ALT 12 - 78 U/L 24 26   Alk Phos 45 - 117 U/L 111 118 (H)   Bili, Total 0.2 - 1.0 MG/DL 0.7 0.5   Bili, Direct 0.0 - 0.2 MG/DL 0.2 0.2   Albumin 3.5 - 5.0 g/dL 4.1 3.9   BUN 6 - 20 MG/DL 14 17   Creat 0.55 - 1.02 MG/DL 0.64 0.64   Na 136 - 145 mmol/L 140 140   K 3.5 - 5.1 mmol/L 4.2 3.9   Cl 97 - 108 mmol/L 108 105   CO2 21 - 32 mmol/L 27 30   Glucose 65 - 100 mg/dL 82 82     Cancer Screening Latest Ref Rng & Units 9/21/2021   AFP, Serum 0.0 - 8.0 ng/mL 1.8     SEROLOGIES:  Serologies Latest Ref Rng & Units 9/21/2021   Hep A Ab, Total Negative   Negative   Hep B Surface Ag Index 441.14   Hep B Surface Ag Interp Negative   Positive (A)   Hep B Core Ab, Total Negative   Positive (A)   Hep B Surface Ab mIU/mL <3.10   Hep B Surface Ab Interp NONREACTIVE   NONREACTIVE   Hep C Ab 0.0 - 0.9 s/co ratio <0.1     9/2021. HBEantigen negative, anti-HBE positive. Virology Latest Ref Rng & Units 9/21/2021   HBV DNA IU/mL <10     LIVER HISTOLOGY:  Not available or performed    ENDOSCOPIC PROCEDURES:  Not available or performed    RADIOLOGY:  9/2021. Ultrasound of liver. Normal appearing liver. Normal PV flow. Several large cysts measuring:  6.7 x 5.1 cm  5.4 x 5.2 cm containing echogenic material.  3.3 x 2.4 cm.       OTHER TESTING:  Not available or performed    FOLLOW-UP:  All of the issues listed above in the Assessment and Plan were discussed with the patient. All questions were answered. The patient expressed a clear understanding of the above. [address] [place] [address] in 4 weeks for Fibroscan to review all data and determine the treatment plan.       Keiry Vogel MD  [facility] [address] [place] [address] 22.  [phone]  [address] [place] [address]

## 2021-09-23 LAB
AFP L3 MFR SERPL: NORMAL % (ref 0–9.9)
AFP SERPL-MCNC: 1.8 NG/ML (ref 0–8)
HAV AB SER QL IA: NEGATIVE
HBV CORE AB SERPL QL IA: POSITIVE
HBV DNA SERPL NAA+PROBE-ACNC: <10 IU/ML
HBV DNA SERPL NAA+PROBE-LOG IU: NORMAL LOG10 IU/ML
HBV E AB SERPL QL IA: POSITIVE
HBV E AG SERPL QL IA: NEGATIVE
HCV AB S/CO SERPL IA: <0.1 S/CO RATIO (ref 0–0.9)
HCV AB SERPL QL IA: NORMAL
TEST INFORMATION: NORMAL

## 2021-09-24 ENCOUNTER — HOSPITAL ENCOUNTER (OUTPATIENT)
Dept: ULTRASOUND IMAGING | Age: 61
Discharge: HOME | End: 2021-09-24
Attending: INTERNAL MEDICINE
Payer: COMMERCIAL

## 2021-09-24 DIAGNOSIS — B18.1 CHRONIC HEPATITIS B (HCC): ICD-10-CM

## 2021-09-24 PROCEDURE — 76705 ECHO EXAM OF ABDOMEN: CPT

## 2021-09-29 LAB — HDV AB SER QL IA: NEGATIVE

## 2021-09-30 DIAGNOSIS — I10 ESSENTIAL HYPERTENSION: ICD-10-CM

## 2021-10-04 RX ORDER — VALSARTAN 80 MG/1
TABLET ORAL
Qty: 90 TABLET | Refills: 3 | Status: SHIPPED | OUTPATIENT
Start: 2021-10-04 | End: 2022-01-10 | Stop reason: SDUPTHER

## 2021-10-04 NOTE — TELEPHONE ENCOUNTER
Pt called to check status of refill request from 9/30/21.  Please address as soon as possible, pt is now out of medication

## 2021-12-01 ENCOUNTER — OFFICE VISIT (OUTPATIENT)
Dept: FAMILY MEDICINE CLINIC | Age: 61
End: 2021-12-01
Payer: COMMERCIAL

## 2021-12-01 VITALS
SYSTOLIC BLOOD PRESSURE: 136 MMHG | TEMPERATURE: 97.1 F | DIASTOLIC BLOOD PRESSURE: 68 MMHG | WEIGHT: 151 LBS | HEIGHT: 65 IN | OXYGEN SATURATION: 99 % | BODY MASS INDEX: 25.16 KG/M2 | HEART RATE: 61 BPM

## 2021-12-01 DIAGNOSIS — M54.2 CHRONIC NECK PAIN: ICD-10-CM

## 2021-12-01 DIAGNOSIS — M54.50 CHRONIC LEFT-SIDED LOW BACK PAIN WITHOUT SCIATICA: Primary | ICD-10-CM

## 2021-12-01 DIAGNOSIS — G89.29 CHRONIC NECK PAIN: ICD-10-CM

## 2021-12-01 DIAGNOSIS — G89.29 CHRONIC LEFT-SIDED LOW BACK PAIN WITHOUT SCIATICA: Primary | ICD-10-CM

## 2021-12-01 PROCEDURE — 99214 OFFICE O/P EST MOD 30 MIN: CPT | Performed by: FAMILY MEDICINE

## 2021-12-01 RX ORDER — METHOCARBAMOL 500 MG/1
500 TABLET, FILM COATED ORAL
Qty: 40 TABLET | Refills: 1 | Status: SHIPPED | OUTPATIENT
Start: 2021-12-01 | End: 2022-01-10

## 2021-12-01 RX ORDER — DICLOFENAC SODIUM 100 MG/1
100 TABLET, FILM COATED, EXTENDED RELEASE ORAL DAILY
Qty: 30 TABLET | Refills: 1 | Status: SHIPPED | OUTPATIENT
Start: 2021-12-01 | End: 2022-01-10

## 2021-12-01 NOTE — PROGRESS NOTES
HISTORY OF PRESENT ILLNESS  Maggy Vargas is a 57 y.o. female. Patient presents with:  Neck Pain: started about two months ago. Back Pain    No history of neck trauma. Her pain is unchanged and does not radiate. She has had low back pain for about 2 months. There is also no history of trauma. This also does not radiate. Also, she is taking nothing for pain. Review of Systems   Constitutional: Negative for chills and fever. Gastrointestinal:        No bowel incontinence   Genitourinary:        No bladder incontinence   Musculoskeletal: Positive for back pain and neck pain. Negative for falls. Neurological: Negative for tingling, sensory change and focal weakness. Visit Vitals  /68 (BP 1 Location: Left upper arm, BP Patient Position: Sitting, BP Cuff Size: Adult)   Pulse 61   Temp 97.1 °F (36.2 °C) (Temporal)   Ht 5' 5\" (1.651 m)   Wt 151 lb (68.5 kg)   SpO2 99%   BMI 25.13 kg/m²     Physical Exam  Constitutional:       General: She is not in acute distress. Appearance: She is well-developed. She is not diaphoretic. Musculoskeletal:      Cervical back: Spasms and tenderness present. No swelling, deformity or bony tenderness. Lumbar back: No spasms, tenderness or bony tenderness. Normal range of motion. Negative right straight leg raise test and negative left straight leg raise test.        Back:       Comments: The left low back and buttock are tender   Skin:     General: Skin is warm and dry. Neurological:      Mental Status: She is alert and oriented to person, place, and time. Sensory: No sensory deficit. Gait: Gait normal.      Deep Tendon Reflexes:      Reflex Scores:       Tricep reflexes are 2+ on the right side and 2+ on the left side. Bicep reflexes are 2+ on the right side and 2+ on the left side. Brachioradialis reflexes are 2+ on the right side and 2+ on the left side.        Patellar reflexes are 2+ on the right side and 2+ on the left side.       Achilles reflexes are 2+ on the right side and 2+ on the left side. Comments: Negative SLRs         ASSESSMENT and PLAN    ICD-10-CM ICD-9-CM    1. Chronic left-sided low back pain without sciatica  M54.50 724.2 diclofenac (VOLTAREN XR) 100 mg ER tablet    G89.29 338.29    2. Chronic neck pain  M54.2 723.1 diclofenac (VOLTAREN XR) 100 mg ER tablet    G89.29 338.29 methocarbamoL (Robaxin) 500 mg tablet        Likely mechanical  Heat  Voltaren prn  Robaxin prn  Neck and back exercises given    Follow-up and Dispositions    · Return if not better in 4 weeks. Reviewed plan of care. Patient has provided input and agrees with goals.

## 2021-12-01 NOTE — PATIENT INSTRUCTIONS
Back Pain: Care Instructions  Your Care Instructions     Back pain has many possible causes. It is often related to problems with muscles and ligaments of the back. It may also be related to problems with the nerves, discs, or bones of the back. Moving, lifting, standing, sitting, or sleeping in an awkward way can strain the back. Sometimes you don't notice the injury until later. Arthritis is another common cause of back pain. Although it may hurt a lot, back pain usually improves on its own within several weeks. Most people recover in 12 weeks or less. Using good home treatment and being careful not to stress your back can help you feel better sooner. Follow-up care is a key part of your treatment and safety. Be sure to make and go to all appointments, and call your doctor if you are having problems. It's also a good idea to know your test results and keep a list of the medicines you take. How can you care for yourself at home? · Sit or lie in positions that are most comfortable and reduce your pain. Try one of these positions when you lie down:  ? Lie on your back with your knees bent and supported by large pillows. ? Lie on the floor with your legs on the seat of a sofa or chair. ? Lie on your side with your knees and hips bent and a pillow between your legs. ? Lie on your stomach if it does not make pain worse. · Do not sit up in bed, and avoid soft couches and twisted positions. Bed rest can help relieve pain at first, but it delays healing. Avoid bed rest after the first day of back pain. · Change positions every 30 minutes. If you must sit for long periods of time, take breaks from sitting. Get up and walk around, or lie in a comfortable position. · Try using a heating pad on a low or medium setting for 15 to 20 minutes every 2 or 3 hours. Try a warm shower in place of one session with the heating pad. · You can also try an ice pack for 10 to 15 minutes every 2 to 3 hours.  Put a thin cloth between the ice pack and your skin. · Take pain medicines exactly as directed. ? If the doctor gave you a prescription medicine for pain, take it as prescribed. ? If you are not taking a prescription pain medicine, ask your doctor if you can take an over-the-counter medicine. · Take short walks several times a day. You can start with 5 to 10 minutes, 3 or 4 times a day, and work up to longer walks. Walk on level surfaces and avoid hills and stairs until your back is better. · Return to work and other activities as soon as you can. Continued rest without activity is usually not good for your back. · To prevent future back pain, do exercises to stretch and strengthen your back and stomach. Learn how to use good posture, safe lifting techniques, and proper body mechanics. When should you call for help? Call your doctor now or seek immediate medical care if:    · You have new or worsening numbness in your legs.     · You have new or worsening weakness in your legs. (This could make it hard to stand up.)     · You lose control of your bladder or bowels. Watch closely for changes in your health, and be sure to contact your doctor if:    · You have a fever, lose weight, or don't feel well.     · You do not get better as expected. Where can you learn more? Go to http://www.lala.com/  Enter I594 in the search box to learn more about \"Back Pain: Care Instructions. \"  Current as of: July 1, 2021               Content Version: 13.0  © 4589-4381 [employer]. Care instructions adapted under license by [employer] (which disclaims liability or warranty for this information). If you have questions about a medical condition or this instruction, always ask your healthcare professional. [place] [address] any warranty or liability for your use of this information.          Low Back Pain: Exercises  Introduction  Here are some examples of exercises for you to try. The exercises may be suggested for a condition or for rehabilitation. Start each exercise slowly. Ease off the exercises if you start to have pain. You will be told when to start these exercises and which ones will work best for you. How to do the exercises  Press-up    1. Lie on your stomach, supporting your body with your forearms. 2. Press your elbows down into the floor to raise your upper back. As you do this, relax your stomach muscles and allow your back to arch without using your back muscles. As your press up, do not let your hips or pelvis come off the floor. 3. Hold for 15 to 30 seconds, then relax. 4. Repeat 2 to 4 times. Alternate arm and leg (bird dog) exercise    Do this exercise slowly. Try to keep your body straight at all times, and do not let one hip drop lower than the other. 1. Start on the floor, on your hands and knees. 2. Tighten your belly muscles. 3. Raise one leg off the floor, and hold it straight out behind you. Be careful not to let your hip drop down, because that will twist your trunk. 4. Hold for about 6 seconds, then lower your leg and switch to the other leg. 5. Repeat 8 to 12 times on each leg. 6. Over time, work up to holding for 10 to 30 seconds each time. 7. If you feel stable and secure with your leg raised, try raising the opposite arm straight out in front of you at the same time. Knee-to-chest exercise    1. Lie on your back with your knees bent and your feet flat on the floor. 2. Bring one knee to your chest, keeping the other foot flat on the floor (or keeping the other leg straight, whichever feels better on your lower back). 3. Keep your lower back pressed to the floor. Hold for at least 15 to 30 seconds. 4. Relax, and lower the knee to the starting position. 5. Repeat with the other leg. Repeat 2 to 4 times with each leg. 6. To get more stretch, put your other leg flat on the floor while pulling your knee to your chest.  Curl-ups    1.  Lie on the floor on your back with your knees bent at a 90-degree angle. Your feet should be flat on the floor, about 12 inches from your buttocks. 2. Cross your arms over your chest. If this bothers your neck, try putting your hands behind your neck (not your head), with your elbows spread apart. 3. Slowly tighten your belly muscles and raise your shoulder blades off the floor. 4. Keep your head in line with your body, and do not press your chin to your chest.  5. Hold this position for 1 or 2 seconds, then slowly lower yourself back down to the floor. 6. Repeat 8 to 12 times. Pelvic tilt exercise    1. Lie on your back with your knees bent. 2. \"Brace\" your stomach. This means to tighten your muscles by pulling in and imagining your belly button moving toward your spine. You should feel like your back is pressing to the floor and your hips and pelvis are rocking back. 3. Hold for about 6 seconds while you breathe smoothly. 4. Repeat 8 to 12 times. Heel dig bridging    1. Lie on your back with both knees bent and your ankles bent so that only your heels are digging into the floor. Your knees should be bent about 90 degrees. 2. Then push your heels into the floor, squeeze your buttocks, and lift your hips off the floor until your shoulders, hips, and knees are all in a straight line. 3. Hold for about 6 seconds as you continue to breathe normally, and then slowly lower your hips back down to the floor and rest for up to 10 seconds. 4. Do 8 to 12 repetitions. Hamstring stretch in doorway    1. Lie on your back in a doorway, with one leg through the open door. 2. Slide your leg up the wall to straighten your knee. You should feel a gentle stretch down the back of your leg. 3. Hold the stretch for at least 15 to 30 seconds. Do not arch your back, point your toes, or bend either knee. Keep one heel touching the floor and the other heel touching the wall. 4. Repeat with your other leg.   5. Do 2 to 4 times for each leg. Hip flexor stretch    1. Kneel on the floor with one knee bent and one leg behind you. Place your forward knee over your foot. Keep your other knee touching the floor. 2. Slowly push your hips forward until you feel a stretch in the upper thigh of your rear leg. 3. Hold the stretch for at least 15 to 30 seconds. Repeat with your other leg. 4. Do 2 to 4 times on each side. Wall sit    1. Stand with your back 10 to 12 inches away from a wall. 2. Lean into the wall until your back is flat against it. 3. Slowly slide down until your knees are slightly bent, pressing your lower back into the wall. 4. Hold for about 6 seconds, then slide back up the wall. 5. Repeat 8 to 12 times. Follow-up care is a key part of your treatment and safety. Be sure to make and go to all appointments, and call your doctor if you are having problems. It's also a good idea to know your test results and keep a list of the medicines you take. Where can you learn more? Go to http://www.gray.com/  Enter B587 in the search box to learn more about \"Low Back Pain: Exercises. \"  Current as of: July 1, 2021               Content Version: 13.0  © 2006-2021 [employer]. Care instructions adapted under license by [employer] (which disclaims liability or warranty for this information). If you have questions about a medical condition or this instruction, always ask your healthcare professional. [place] [address] any warranty or liability for your use of this information. Neck Spasm: Exercises  Introduction  Here are some examples of exercises for you to try. The exercises may be suggested for a condition or for rehabilitation. Start each exercise slowly. Ease off the exercises if you start to have pain. You will be told when to start these exercises and which ones will work best for you. How to do the exercises  Levator scapula stretch    1.  Sit in a firm chair, or stand up straight. 2. Gently tilt your head toward your left shoulder. 3. Turn your head to look down into your armpit, bending your head slightly forward. Let the weight of your head stretch your neck muscles. 4. Hold for 15 to 30 seconds. 5. Return to your starting position. 6. Follow the same instructions above, but tilt your head toward your right shoulder. 7. Repeat 2 to 4 times toward each shoulder. Upper trapezius stretch    1. Sit in a firm chair, or stand up straight. 2. This stretch works best if you keep your shoulder down as you lean away from it. To help you remember to do this, start by relaxing your shoulders and lightly holding on to your thighs or your chair. 3. Tilt your head toward your shoulder and hold for 15 to 30 seconds. Let the weight of your head stretch your muscles. 4. If you would like a little added stretch, place your arm behind your back. Use the arm opposite of the direction you are tilting your head. For example, if you are tilting your head to the left, place your right arm behind your back. 5. Repeat 2 to 4 times toward each shoulder. Neck rotation    1. Sit in a firm chair, or stand up straight. 2. Keeping your chin level, turn your head to the right, and hold for 15 to 30 seconds. 3. Turn your head to the left, and hold for 15 to 30 seconds. 4. Repeat 2 to 4 times to each side. Chin tuck    1. Lie on the floor with a rolled-up towel under your neck. Your head should be touching the floor. 2. Slowly bring your chin toward the front of your neck. 3. Hold for a count of 6, and then relax for up to 10 seconds. 4. Repeat 8 to 12 times. Forward neck flexion    1. Sit in a firm chair, or stand up straight. 2. Bend your head forward. 3. Hold for 15 to 30 seconds, then return to your starting position. 4. Repeat 2 to 4 times. Follow-up care is a key part of your treatment and safety.  Be sure to make and go to all appointments, and call your doctor if you are having problems. It's also a good idea to know your test results and keep a list of the medicines you take. Where can you learn more? Go to http://www.gray.com/  Enter P962 in the search box to learn more about \"Neck Spasm: Exercises. \"  Current as of: July 1, 2021               Content Version: 13.0  © 2006-2021 [employer]. Care instructions adapted under license by [employer] (which disclaims liability or warranty for this information). If you have questions about a medical condition or this instruction, always ask your healthcare professional. [place] [address] any warranty or liability for your use of this information. Neck Spasm: Exercises  Introduction  Here are some examples of exercises for you to try. The exercises may be suggested for a condition or for rehabilitation. Start each exercise slowly. Ease off the exercises if you start to have pain. You will be told when to start these exercises and which ones will work best for you. How to do the exercises  Levator scapula stretch    8. Sit in a firm chair, or stand up straight. 9. Gently tilt your head toward your left shoulder. 10. Turn your head to look down into your armpit, bending your head slightly forward. Let the weight of your head stretch your neck muscles. 11. Hold for 15 to 30 seconds. 12. Return to your starting position. 13. Follow the same instructions above, but tilt your head toward your right shoulder. 14. Repeat 2 to 4 times toward each shoulder. Upper trapezius stretch    6. Sit in a firm chair, or stand up straight. 7. This stretch works best if you keep your shoulder down as you lean away from it. To help you remember to do this, start by relaxing your shoulders and lightly holding on to your thighs or your chair. 8. Tilt your head toward your shoulder and hold for 15 to 30 seconds. Let the weight of your head stretch your muscles.   9. If you would like a little added stretch, place your arm behind your back. Use the arm opposite of the direction you are tilting your head. For example, if you are tilting your head to the left, place your right arm behind your back. 10. Repeat 2 to 4 times toward each shoulder. Neck rotation    5. Sit in a firm chair, or stand up straight. 6. Keeping your chin level, turn your head to the right, and hold for 15 to 30 seconds. 7. Turn your head to the left, and hold for 15 to 30 seconds. 8. Repeat 2 to 4 times to each side. Chin tuck    5. Lie on the floor with a rolled-up towel under your neck. Your head should be touching the floor. 6. Slowly bring your chin toward the front of your neck. 7. Hold for a count of 6, and then relax for up to 10 seconds. 8. Repeat 8 to 12 times. Forward neck flexion    5. Sit in a firm chair, or stand up straight. 6. Bend your head forward. 7. Hold for 15 to 30 seconds, then return to your starting position. 8. Repeat 2 to 4 times. Follow-up care is a key part of your treatment and safety. Be sure to make and go to all appointments, and call your doctor if you are having problems. It's also a good idea to know your test results and keep a list of the medicines you take. Where can you learn more? Go to http://www.gray.com/  Enter P962 in the search box to learn more about \"Neck Spasm: Exercises. \"  Current as of: July 1, 2021               Content Version: 13.0  © 2006-2021 [employer]. Care instructions adapted under license by [employer] (which disclaims liability or warranty for this information). If you have questions about a medical condition or this instruction, always ask your healthcare professional. [address] any warranty or liability for your use of this information.

## 2021-12-01 NOTE — PROGRESS NOTES
Identified pt with two pt identifiers(name and ). Reviewed record in preparation for visit and have obtained necessary documentation. Chief Complaint   Patient presents with    Neck Pain     started about two months ago.  Back Pain        Vitals:    21 1035   BP: 136/68   Pulse: 61   Temp: 97.1 °F (36.2 °C)   TempSrc: Temporal   SpO2: 99%   Weight: 151 lb (68.5 kg)   Height: 5' 5\" (1.651 m)   PainSc:   5   PainLoc: Generalized       Health Maintenance Due   Topic    DTaP/Tdap/Td series (1 - Tdap)    Cervical cancer screen     Shingrix Vaccine Age 50> (1 of 2)    Flu Vaccine (1)       Coordination of Care Questionnaire:  :   1) Have you been to an emergency room, urgent care, or hospitalized since your last visit? If yes, where when, and reason for visit? No      2. Have seen or consulted any other health care provider since your last visit? If yes, where when, and reason for visit?   No

## 2022-01-10 ENCOUNTER — OFFICE VISIT (OUTPATIENT)
Dept: FAMILY MEDICINE CLINIC | Age: 62
End: 2022-01-10
Payer: COMMERCIAL

## 2022-01-10 VITALS
TEMPERATURE: 97.5 F | DIASTOLIC BLOOD PRESSURE: 70 MMHG | OXYGEN SATURATION: 97 % | WEIGHT: 150 LBS | SYSTOLIC BLOOD PRESSURE: 130 MMHG | BODY MASS INDEX: 24.99 KG/M2 | HEIGHT: 65 IN | HEART RATE: 77 BPM

## 2022-01-10 DIAGNOSIS — I10 ESSENTIAL HYPERTENSION: Primary | ICD-10-CM

## 2022-01-10 DIAGNOSIS — H04.123 DRY EYES: ICD-10-CM

## 2022-01-10 PROCEDURE — 99213 OFFICE O/P EST LOW 20 MIN: CPT | Performed by: FAMILY MEDICINE

## 2022-01-10 RX ORDER — PROPYLENE GLYCOL 0.06 MG/ML
EMULSION OPHTHALMIC
COMMUNITY
Start: 2022-01-10 | End: 2022-07-20

## 2022-01-10 RX ORDER — VALSARTAN 80 MG/1
80 TABLET ORAL DAILY
Qty: 90 TABLET | Refills: 4 | Status: SHIPPED | OUTPATIENT
Start: 2022-01-10 | End: 2022-07-20 | Stop reason: SDUPTHER

## 2022-01-10 NOTE — PROGRESS NOTES
HISTORY OF PRESENT ILLNESS  Norah Barros is a 57 y.o. female. Patient presents with: Follow-up  Hypertension    Compliant with meds. Also, she has been having trouble with dry eyes. Review of Systems   Eyes: Negative for blurred vision. Respiratory: Negative for shortness of breath. Cardiovascular: Negative for chest pain. Neurological: Negative for dizziness, sensory change, speech change, focal weakness and headaches. Visit Vitals  /70 Comment: left arm, manual cuff   Pulse 77   Temp 97.5 °F (36.4 °C) (Temporal)   Ht 5' 5\" (1.651 m)   Wt 150 lb (68 kg)   SpO2 97%   BMI 24.96 kg/m²     BP Readings from Last 3 Encounters:   01/10/22 (!) 129/91   12/01/21 136/68   09/21/21 130/89       Physical Exam  Vitals and nursing note reviewed. Constitutional:       General: She is not in acute distress. Appearance: She is well-developed. She is not diaphoretic. Cardiovascular:      Rate and Rhythm: Normal rate and regular rhythm. Heart sounds: Normal heart sounds. No murmur heard. No friction rub. No gallop. Pulmonary:      Effort: Pulmonary effort is normal. No respiratory distress. Breath sounds: Normal breath sounds. No wheezing or rales. Skin:     General: Skin is warm and dry. Neurological:      Mental Status: She is alert and oriented to person, place, and time. ASSESSMENT and PLAN    ICD-10-CM ICD-9-CM    1. Essential hypertension  I10 401.9 valsartan (DIOVAN) 80 mg tablet   2. Dry eyes  H04. 123 375.15 propylene glycoL (Systane Balance) 0.6 % drop        Blood pressure controlled  Continue current plans. Refills per orders  Systane Balance drops prn    Follow-up and Dispositions    · Return in about 6 months (around 7/10/2022) for blood pressure. Reviewed plan of care. Patient has provided input and agrees with goals.

## 2022-01-10 NOTE — PROGRESS NOTES
Identified pt with two pt identifiers(name and ). Reviewed record in preparation for visit and have obtained necessary documentation. Chief Complaint   Patient presents with    Follow-up    Hypertension        Vitals:    01/10/22 0945 01/10/22 0954   BP: (!) 143/96 (!) 129/91   Pulse: 77    Temp: 97.5 °F (36.4 °C)    TempSrc: Temporal    SpO2: 97%    Weight: 150 lb (68 kg)    Height: 5' 5\" (1.651 m)    PainSc:   0 - No pain        Health Maintenance Due   Topic    DTaP/Tdap/Td series (1 - Tdap)    Cervical cancer screen     Shingrix Vaccine Age 50> (1 of 2)    Flu Vaccine (1)    COVID-19 Vaccine (3 - Booster for Mustafa Peter series)       Coordination of Care Questionnaire:  :   1) Have you been to an emergency room, urgent care, or hospitalized since your last visit? If yes, where when, and reason for visit? No      2. Have seen or consulted any other health care provider since your last visit? If yes, where when, and reason for visit?   No

## 2022-03-14 ENCOUNTER — OFFICE VISIT (OUTPATIENT)
Dept: HEMATOLOGY | Age: 62
End: 2022-03-14
Payer: COMMERCIAL

## 2022-03-14 VITALS
TEMPERATURE: 97.2 F | WEIGHT: 152.4 LBS | HEIGHT: 65 IN | SYSTOLIC BLOOD PRESSURE: 144 MMHG | BODY MASS INDEX: 25.39 KG/M2 | HEART RATE: 71 BPM | DIASTOLIC BLOOD PRESSURE: 91 MMHG

## 2022-03-14 DIAGNOSIS — B18.1 CHRONIC HEPATITIS B (HCC): Primary | ICD-10-CM

## 2022-03-14 DIAGNOSIS — R16.0 LIVER MASS: ICD-10-CM

## 2022-03-14 PROCEDURE — 91200 LIVER ELASTOGRAPHY: CPT | Performed by: NURSE PRACTITIONER

## 2022-03-14 PROCEDURE — 99214 OFFICE O/P EST MOD 30 MIN: CPT | Performed by: NURSE PRACTITIONER

## 2022-03-14 NOTE — PROGRESS NOTES
[address] [facility]      Vivian Rodriguez MD, Pina Garcia, Óscar Sapp MD, MPH      Heather Castro, PA-NAMRATA Torres, [facility]     April S Erik, [facility]   Merissa Duenas EDITH-    Laci Browne, [facility]       [facility]is [facility] [address]    at [address], [address] [address]  22.    [phone]    FAX: [address] [place] [address] Drive, [address], [address]    [phone]    FAX: [phone]       Patient Care Team:  Ange Boswell MD as PCP - General (Family Medicine)  Ange Boswell MD as PCP - [facility] Provider  Serge Pastrana MD (Breast Surgery)      Problem List  Date Reviewed: 1/10/2022          Codes Class Noted    History of hip replacement ICD-10-CM: Z96.649  ICD-9-CM: V43.64  9/21/2021        History of total right knee replacement ICD-10-CM: Q73.320  ICD-9-CM: V43.65  9/21/2021        Chronic [facility] B ([facility] ICD-10-CM: B18.1  ICD-9-CM: 070.32  1/21/2021        Arthritis ICD-10-CM: M19.90  ICD-9-CM: 716.90  Unknown    Overview Signed 6/13/2019  2:18 PM by Ange Boswell MD     bilateral knees             Hypertension ICD-10-CM: Z74  ICD-9-CM: 401.9  Unknown              Fuentes Rojo is being seen at [address] for management of chronic HBV. The active problem list, all pertinent past medical history, medications, radiologic findings and laboratory findings related to the liver disorder were reviewed and discussed with the patient. The patient is a 57y.o. year old  female who has tested positive for HBV in 1980s. Risk factors for acquiring HBV are immigration from [place]. There was no history of acute icteric hepatitis     Imaging of the liver was ordered by Dr. Petar Garcia 9/2021.  The results demonstrate a heterogeneous mildly hyperechoic liver with several large cysts measuring 6.7 x 5.1 x  4.6 cm, 5.4 x 5.2 x 5.3 cm which contains echogenic foci, and 3.3 x 2.6 x 2.4 cm. MRI recommended for further evaluation. This has not yet been completed. An assessment of liver fibrosis with biopsy or elastography has not been performed. She returns for Fibroscan today. The patient has never received treatment for chronic HBV. The HBV DNA is undetectable but the HBsAg remains positive. The patient does not have any symptoms which could be attributed to the liver disorder. The patient is not experiencing the following symptoms which are commonly seen in this liver disorder: Fatigue, or pain in the right side over the liver,     The patient completes all daily activities without any functional limitations. ASSESSMENT AND PLAN:  Chronic HBV  This is E-antigen negative inactive HBV. Assessment of liver fibrosis was performed with Fibroscan in the office today. The result was 5.4 kPa which correlates with no fibrosis. The CAP score of 373 suggests hepatic steatosis. Have performed laboratory testing to monitor liver function and degree of liver injury. This included BMP, hepatic panel, and CBC with platelet count. Laboratory testing from 9/21/2021 reviewed in detail. Follow-up testing ordered today. The liver transaminases, ALP, liver function and platelet count are normal.     The level of HBV DNA is <10. The HBsAg remains positive and the anti-HBV is negative. She has a low level of virus which is why she has not cleared surface antigen. Will repeat testing today. The patient is not currently receiving treatment for HBV because she has inactive disease. The Fibroscan can be repeated annually or as often as clinically indicated to assess for fibrosis progression and/or regression.     Liver Cyst  The patient has several large liver cysts measuring 6.7 x 5.1 x  4.6 cm, 5.4 x 5.2 x 5.3 cm which contains echogenic foci, and 3.3 x 2.6 x 2.4 cm. Will check CEA, AFP and CA 19-9 today. Cysts are typically benign. Will order MRI to evaluate further due to one of the cysts containing echogenic foci. The patient is asymptomatic     Screening for Hepatocellular Carcinoma  HCC screening was performed with ultrasound 9/2021. There were several cysts but no lesion concerning for [address] AFP was normal. Will order MRI and AFP due to the mentioned above. Treatment of other medical problems in patients with chronic liver disease  There are no contraindications for the patient to take most medications that are necessary for treatment of other medical issues. Normal doses of acetaminophen, as recommended on the label of the bottle, are not hepatotoxic except in the setting of daily alcohol use, even in patients with cirrhosis and can be utilized for pain. Counseling for alcohol in patients with chronic liver disease  The patient was counseled regarding alcohol consumption and the effect of alcohol on chronic liver disease. The patient does not consume any significant amount of alcohol. Vaccinations   Vaccination for viral hepatitis A is recommended since the patient has no serologic evidence of previous exposure or vaccination with immunity. The patient has received 2 doses of COVID-19 vaccine. Routine vaccinations against other bacterial and viral agents can be performed as indicated. Annual flu vaccination should be administered if indicated. ALLERGIES  No Known Allergies    MEDICATIONS  Current Outpatient Medications   Medication Sig    valsartan (DIOVAN) 80 mg tablet Take 1 Tablet by mouth daily.  propylene glycoL (Systane Balance) 0.6 % drop Apply  to eye. (Patient not taking: Reported on 3/14/2022)     No current facility-administered medications for this visit.        SYSTEM REVIEW NOT RELATED TO LIVER DISEASE OR REVIEWED ABOVE:  Constitution systems: Negative for fever, chills, weight gain, weight loss. Eyes: Negative for visual changes. ENT: Negative for sore throat, painful swallowing. Respiratory: Negative for cough, hemoptysis, SOB. Cardiology: Negative for chest pain, palpitations. GI:  Negative for constipation or diarrhea. : Negative for urinary frequency, dysuria, hematuria, nocturia. Skin: Negative for rash. Hematology: Negative for easy bruising, blood clots. Musculo-skeletal: Negative for back pain, muscle pain, weakness. Neurologic: Negative for headaches, dizziness, vertigo, memory problems not related to HE. Psychology: Negative for anxiety, depression. FAMILY HISTORY:  There is no family history of liver disease. SOCIAL HISTORY:  The patient is . The patient has 2 children,   The children have been vaccinated for HBV. The patient has never used tobacco products. The patient has never consumed significant amounts of alcohol. The patient used to work in [employer]. The patient has not worked since 2013. PHYSICAL EXAMINATION:  Visit Vitals  BP (!) 144/91 (BP 1 Location: Left upper arm, BP Patient Position: Sitting, BP Cuff Size: Adult)   Pulse 71   Temp 97.2 °F (36.2 °C) (Temporal)   Ht 5' 5\" (1.651 m)   Wt 152 lb 6.4 oz (69.1 kg)   BMI 25.36 kg/m²       General: No acute distress. Eyes: Sclera anicteric. ENT: No oral lesions. Thyroid normal.  Nodes: No adenopathy. Skin: No spider angiomata. No jaundice. No palmar erythema. Abdomen: Soft non-tender, liver size normal to percussion/palpation. Spleen not palpable. No obvious ascites. Extremities: No edema. No muscle wasting. No gross arthritic changes. Neurologic: Alert and oriented. Cranial nerves grossly intact. No asterixis.     LABORATORY STUDIES:  Liver [place] of [address] [place] [address] & Units 9/21/2021 7/12/2021   WBC 3.6 - 11.0 K/uL 4.3    ANC 1.8 - 8.0 K/UL 2.2    HGB 11.5 - 16.0 g/dL 15.3     - 400 K/uL 238    AST 15 - 37 U/L 21 17   ALT 12 - 78 U/L 24 26   Alk Phos 45 - 117 U/L 111 118 (H)   Bili, Total 0.2 - 1.0 MG/DL 0.7 0.5   Bili, Direct 0.0 - 0.2 MG/DL 0.2 0.2   Albumin 3.5 - 5.0 g/dL 4.1 3.9   BUN 6 - 20 MG/DL 14 17   Creat 0.55 - 1.02 MG/DL 0.64 0.64   Na 136 - 145 mmol/L 140 140   K 3.5 - 5.1 mmol/L 4.2 3.9   Cl 97 - 108 mmol/L 108 105   CO2 21 - 32 mmol/L 27 30   Glucose 65 - 100 mg/dL 82 82     Cancer Screening Latest Ref Rng & Units 9/21/2021   AFP, Serum 0.0 - 8.0 ng/mL 1.8     Laboratory testing from 9/21/2021 reviewed in detail. Additional testing included to evaluate progression or regression of disease. Laboratory testing results from today will be communicated by My Chart or mail. SEROLOGIES:  Serologies Latest Ref Rng & Units 9/21/2021   Hep A Ab, Total Negative   Negative   Hep B Surface Ag Index 441.14   Hep B Surface Ag Interp Negative   Positive (A)   Hep B Core Ab, Total Negative   Positive (A)   Hep B Surface Ab mIU/mL <3.10   Hep B Surface Ab Interp NONREACTIVE   NONREACTIVE   Hep C Ab 0.0 - 0.9 s/co ratio <0.1     9/2021. HBEantigen negative, anti-HBE positive. Virology Latest Ref Rng & Units 9/21/2021   HBV DNA IU/mL <10     LIVER HISTOLOGY:  3/2022. [facility]. EkPa was 5.4. IQR/med 28%. . The results suggested a fibrosis level of F0. The CAP score suggests there is hepatic steatosis. ENDOSCOPIC PROCEDURES:  Not available or performed    RADIOLOGY:  9/2021. Ultrasound of liver. Normal appearing liver. Normal PV flow. Several large cysts measuring: 6.7 x 5.1 cm, 5.4 x 5.2 cm containing echogenic material, 3.3 x 2.4 cm. OTHER TESTING:  Not available or performed    FOLLOW-UP:  All of the issues listed above in the Assessment and Plan were discussed with the patient. All questions were answered. The patient expressed a clear understanding of the above. [address] [address] in 6 months pending results of MRI. Will see her at an earlier date if this is abnormal.     Saranya Valencia, [facility] of [address] [address] [address], [address], [address]  [address] [facility]

## 2022-03-14 NOTE — PROGRESS NOTES
Identified pt with two pt identifiers(name and ). Reviewed record in preparation for visit and have obtained necessary documentation. Chief Complaint   Patient presents with    Hepatitis B     FS with April      Vitals:    22 1053   BP: (!) 144/91   Pulse: 71   Temp: 97.2 °F (36.2 °C)   TempSrc: Temporal   Weight: 152 lb 6.4 oz (69.1 kg)   Height: 5' 5\" (1.651 m)   PainSc:   0 - No pain       Health Maintenance Review: Patient reminded of \"due or due soon\" health maintenance. I have asked the patient to contact his/her primary care provider (PCP) for follow-up on his/her health maintenance. Coordination of Care Questionnaire:  :   1) Have you been to an emergency room, urgent care, or hospitalized since your last visit? If yes, where when, and reason for visit? no       2. Have seen or consulted any other health care provider since your last visit? If yes, where when, and reason for visit? YES. Rheumatologist       Patient is accompanied by self I have received verbal consent from Grzegorz Almeida to discuss any/all medical information while they are present in the room.

## 2022-03-15 LAB
ALBUMIN SERPL-MCNC: 4.5 G/DL (ref 3.8–4.8)
ALP SERPL-CCNC: 121 IU/L (ref 44–121)
ALT SERPL-CCNC: 21 IU/L (ref 0–32)
AST SERPL-CCNC: 19 IU/L (ref 0–40)
BASOPHILS # BLD AUTO: 0 X10E3/UL (ref 0–0.2)
BASOPHILS NFR BLD AUTO: 1 %
BILIRUB DIRECT SERPL-MCNC: 0.12 MG/DL (ref 0–0.4)
BILIRUB SERPL-MCNC: 0.4 MG/DL (ref 0–1.2)
BUN SERPL-MCNC: 15 MG/DL (ref 8–27)
BUN/CREAT SERPL: 30 (ref 12–28)
CALCIUM SERPL-MCNC: 9.1 MG/DL (ref 8.7–10.3)
CANCER AG19-9 SERPL-ACNC: 17 U/ML (ref 0–35)
CEA SERPL-MCNC: 0.4 NG/ML (ref 0–4.7)
CHLORIDE SERPL-SCNC: 108 MMOL/L (ref 96–106)
CO2 SERPL-SCNC: 21 MMOL/L (ref 20–29)
CREAT SERPL-MCNC: 0.5 MG/DL (ref 0.57–1)
EGFR: 106 ML/MIN/1.73
EOSINOPHIL # BLD AUTO: 0.1 X10E3/UL (ref 0–0.4)
EOSINOPHIL NFR BLD AUTO: 2 %
ERYTHROCYTE [DISTWIDTH] IN BLOOD BY AUTOMATED COUNT: 12.6 % (ref 11.7–15.4)
GLUCOSE SERPL-MCNC: 93 MG/DL (ref 65–99)
HBV DNA SERPL NAA+PROBE-ACNC: <10 IU/ML
HBV DNA SERPL NAA+PROBE-LOG IU: NORMAL LOG10 IU/ML
HBV SURFACE AB SER QL: NON REACTIVE
HBV SURFACE AG SERPL QL IA: POSITIVE
HCT VFR BLD AUTO: 48.2 % (ref 34–46.6)
HGB BLD-MCNC: 15.7 G/DL (ref 11.1–15.9)
IMM GRANULOCYTES # BLD AUTO: 0 X10E3/UL (ref 0–0.1)
IMM GRANULOCYTES NFR BLD AUTO: 0 %
INR PPP: 0.9 (ref 0.9–1.2)
LYMPHOCYTES # BLD AUTO: 1.7 X10E3/UL (ref 0.7–3.1)
LYMPHOCYTES NFR BLD AUTO: 28 %
MCH RBC QN AUTO: 30.3 PG (ref 26.6–33)
MCHC RBC AUTO-ENTMCNC: 32.6 G/DL (ref 31.5–35.7)
MCV RBC AUTO: 93 FL (ref 79–97)
MONOCYTES # BLD AUTO: 0.4 X10E3/UL (ref 0.1–0.9)
MONOCYTES NFR BLD AUTO: 7 %
NEUTROPHILS # BLD AUTO: 3.7 X10E3/UL (ref 1.4–7)
NEUTROPHILS NFR BLD AUTO: 62 %
PLATELET # BLD AUTO: 232 X10E3/UL (ref 150–450)
POTASSIUM SERPL-SCNC: 4.3 MMOL/L (ref 3.5–5.2)
PROT SERPL-MCNC: 7.2 G/DL (ref 6–8.5)
PROTHROMBIN TIME: 9.5 SEC (ref 9.1–12)
RBC # BLD AUTO: 5.19 X10E6/UL (ref 3.77–5.28)
REF LAB TEST REF RANGE: NORMAL
SODIUM SERPL-SCNC: 145 MMOL/L (ref 134–144)
WBC # BLD AUTO: 6.1 X10E3/UL (ref 3.4–10.8)

## 2022-03-16 LAB
AFP L3 MFR SERPL: NORMAL % (ref 0–9.9)
AFP SERPL-MCNC: 1.8 NG/ML (ref 0–9.2)

## 2022-03-18 PROBLEM — B18.1 CHRONIC HEPATITIS B (HCC): Status: ACTIVE | Noted: 2021-01-21

## 2022-03-19 PROBLEM — Z96.651 HISTORY OF TOTAL RIGHT KNEE REPLACEMENT: Status: ACTIVE | Noted: 2021-09-21

## 2022-03-19 PROBLEM — Z96.649 HISTORY OF HIP REPLACEMENT: Status: ACTIVE | Noted: 2021-09-21

## 2022-03-21 NOTE — PROGRESS NOTES
Letter sent to the patient regarding the blood work results. The hepatitis B remains inactive.  All other testing was normal.

## 2022-03-23 ENCOUNTER — HOSPITAL ENCOUNTER (OUTPATIENT)
Dept: MRI IMAGING | Age: 62
Discharge: HOME | End: 2022-03-23
Attending: NURSE PRACTITIONER
Payer: MEDICAID

## 2022-03-23 DIAGNOSIS — R16.0 LIVER MASS: ICD-10-CM

## 2022-03-23 DIAGNOSIS — B18.1 CHRONIC HEPATITIS B (HCC): ICD-10-CM

## 2022-03-23 PROCEDURE — 74183 MRI ABD W/O CNTR FLWD CNTR: CPT

## 2022-03-23 PROCEDURE — 74011250636 HC RX REV CODE- 250/636: Performed by: NURSE PRACTITIONER

## 2022-03-23 PROCEDURE — A9585 GADOBUTROL INJECTION: HCPCS | Performed by: NURSE PRACTITIONER

## 2022-03-23 RX ADMIN — GADOBUTROL 7 ML: 604.72 INJECTION INTRAVENOUS at 09:57

## 2022-07-20 ENCOUNTER — OFFICE VISIT (OUTPATIENT)
Dept: FAMILY MEDICINE CLINIC | Age: 62
End: 2022-07-20
Payer: COMMERCIAL

## 2022-07-20 VITALS
OXYGEN SATURATION: 96 % | HEIGHT: 65 IN | SYSTOLIC BLOOD PRESSURE: 123 MMHG | WEIGHT: 150 LBS | HEART RATE: 84 BPM | TEMPERATURE: 97.5 F | DIASTOLIC BLOOD PRESSURE: 83 MMHG | BODY MASS INDEX: 24.99 KG/M2

## 2022-07-20 DIAGNOSIS — I10 ESSENTIAL HYPERTENSION: ICD-10-CM

## 2022-07-20 PROCEDURE — 99213 OFFICE O/P EST LOW 20 MIN: CPT | Performed by: FAMILY MEDICINE

## 2022-07-20 RX ORDER — VALSARTAN 80 MG/1
80 TABLET ORAL DAILY
Qty: 90 TABLET | Refills: 4 | Status: SHIPPED | OUTPATIENT
Start: 2022-07-20 | End: 2022-10-19

## 2022-07-20 NOTE — PROGRESS NOTES
HISTORY OF PRESENT ILLNESS  Jasmin Lopez is a 57 y.o. female. Patient presents with: Follow-up: 6 month f/u  Hypertension          Review of Systems   Eyes:  Negative for blurred vision. Respiratory:  Negative for shortness of breath. Cardiovascular:  Negative for chest pain. Neurological:  Negative for dizziness, sensory change, speech change, focal weakness and headaches. Visit Vitals  /83 (BP 1 Location: Left upper arm, BP Patient Position: Sitting, BP Cuff Size: Adult)   Pulse 84   Temp 97.5 °F (36.4 °C) (Temporal)   Ht 5' 5\" (1.651 m)   Wt 150 lb (68 kg)   SpO2 96%   BMI 24.96 kg/m²     Physical Exam  Vitals and nursing note reviewed. Constitutional:       General: She is not in acute distress. Appearance: She is well-developed. She is not diaphoretic. Cardiovascular:      Rate and Rhythm: Normal rate and regular rhythm. Heart sounds: Normal heart sounds. No murmur heard. No friction rub. No gallop. Pulmonary:      Effort: Pulmonary effort is normal. No respiratory distress. Breath sounds: Normal breath sounds. No wheezing or rales. Skin:     General: Skin is warm and dry. Neurological:      Mental Status: She is alert and oriented to person, place, and time. ASSESSMENT and PLAN    ICD-10-CM ICD-9-CM    1. Essential hypertension  I10 401.9 valsartan (DIOVAN) 80 mg tablet          Blood pressure controlled  Continue current plans. Refills per orders      ICD-10-CM ICD-9-CM    1. Essential hypertension  I10 401.9 valsartan (DIOVAN) 80 mg tablet        Follow-up and Dispositions    Return in about 6 months (around 1/20/2023) for blood pressure. Reviewed plan of care. Patient has provided input and agrees with goals.

## 2022-07-20 NOTE — PROGRESS NOTES
Identified pt with two pt identifiers. Reviewed record in preparation for visit and have obtained necessary documentation. All patient medications has been reviewed. Chief Complaint   Patient presents with    Follow-up     6 month f/u    Hypertension     Additional information about chief complaint:    Visit Vitals  /83 (BP 1 Location: Left upper arm, BP Patient Position: Sitting, BP Cuff Size: Adult)   Pulse 84   Temp 97.5 °F (36.4 °C) (Temporal)   Ht 5' 5\" (1.651 m)   Wt 150 lb (68 kg)   SpO2 96%   BMI 24.96 kg/m²       Health Maintenance Due   Topic    Pneumococcal 0-64 years (1 - PCV)    DTaP/Tdap/Td series (1 - Tdap)    Cervical cancer screen     Shingrix Vaccine Age 50> (1 of 2)    COVID-19 Vaccine (3 - Booster for [employer] series)       1. Have you been to the ER, urgent care clinic since your last visit? Hospitalized since your last visit? No    2. Have you seen or consulted any other health care providers outside of the [address] [place] [address] since your last visit? Include any pap smears or colon screening.  No

## 2022-09-08 ENCOUNTER — HOSPITAL ENCOUNTER (EMERGENCY)
Age: 62
Discharge: HOME | End: 2022-09-08
Attending: EMERGENCY MEDICINE
Payer: COMMERCIAL

## 2022-09-08 ENCOUNTER — APPOINTMENT (OUTPATIENT)
Dept: MRI IMAGING | Age: 62
End: 2022-09-08
Payer: COMMERCIAL

## 2022-09-08 ENCOUNTER — APPOINTMENT (OUTPATIENT)
Dept: CT IMAGING | Age: 62
End: 2022-09-08
Payer: COMMERCIAL

## 2022-09-08 VITALS
DIASTOLIC BLOOD PRESSURE: 74 MMHG | TEMPERATURE: 98.3 F | HEART RATE: 89 BPM | OXYGEN SATURATION: 99 % | SYSTOLIC BLOOD PRESSURE: 156 MMHG | RESPIRATION RATE: 14 BRPM

## 2022-09-08 DIAGNOSIS — S32.038A OTHER CLOSED FRACTURE OF THIRD LUMBAR VERTEBRA, INITIAL ENCOUNTER (HCC): Primary | ICD-10-CM

## 2022-09-08 PROCEDURE — 74176 CT ABD & PELVIS W/O CONTRAST: CPT

## 2022-09-08 PROCEDURE — 72148 MRI LUMBAR SPINE W/O DYE: CPT

## 2022-09-08 PROCEDURE — 74011250637 HC RX REV CODE- 250/637

## 2022-09-08 PROCEDURE — 99284 EMERGENCY DEPT VISIT MOD MDM: CPT

## 2022-09-08 RX ORDER — METHOCARBAMOL 500 MG/1
1000 TABLET, FILM COATED ORAL ONCE
Status: COMPLETED | OUTPATIENT
Start: 2022-09-08 | End: 2022-09-08

## 2022-09-08 RX ORDER — HYDROCODONE BITARTRATE AND ACETAMINOPHEN 5; 325 MG/1; MG/1
1 TABLET ORAL
Qty: 18 TABLET | Refills: 0 | Status: SHIPPED | OUTPATIENT
Start: 2022-09-08 | End: 2022-09-11

## 2022-09-08 RX ORDER — NAPROXEN 250 MG/1
500 TABLET ORAL ONCE
Status: COMPLETED | OUTPATIENT
Start: 2022-09-08 | End: 2022-09-08

## 2022-09-08 RX ORDER — HYDROCODONE BITARTRATE AND ACETAMINOPHEN 5; 325 MG/1; MG/1
2 TABLET ORAL ONCE
Status: COMPLETED | OUTPATIENT
Start: 2022-09-08 | End: 2022-09-08

## 2022-09-08 RX ADMIN — HYDROCODONE BITARTRATE AND ACETAMINOPHEN 2 TABLET: 5; 325 TABLET ORAL at 11:09

## 2022-09-08 RX ADMIN — METHOCARBAMOL TABLETS 1000 MG: 500 TABLET, COATED ORAL at 12:02

## 2022-09-08 RX ADMIN — NAPROXEN 500 MG: 250 TABLET ORAL at 11:09

## 2022-09-08 NOTE — ED NOTES
Bedside shift change report given to Tammy Gibbons (oncoming nurse) by Chao Encinas RN (offgoing nurse). Report included the following information SBAR.

## 2022-09-08 NOTE — ED PROVIDER NOTES
The history is provided by the patient and a relative. Fall   MsChina Jacobs is a 59 yo W PMHx HTN, HLD, chronic Hep B, does not take any blood thinners, who presented to ED via EMS with GLF and 10/10 lower back pain. She was using an aerator in her front yard roughly 2 hours ago this morning when she tripped over her shoes and fell, landing on her lower back. Denies hitting her head, denies LOC. Pt reports 5 min of LH, sensitivity to light, diaphoresis, and nausea while laying in her yard. Denies these sx prior the fall. Denies sx of vertigo. EMS arrived roughly 10 min after her fall. They were called by her son who was outside at the time and heard the pt fall. Son also denies pt hit her head or had any LOC. Currently pt only endorses back pain. No n/t in LEs. She continues to endorse sensitivity to light. Otherwise denies HA, LH, dizziness, changes in vision, SOB, CP, abd pain, saddle anesthesia, bowel/urinary incontinence.     Past Medical History:   Diagnosis Date    Arthritis     bilateral knees    Chronic hepatitis B ([facility] [address] 1/21/2021    Hypercholesterolemia     Hypertension        Past Surgical History:   Procedure Laterality Date    COLONOSCOPY N/A 1/21/2020    COLONOSCOPY performed by Salvador Chaidez MD at [facility] ENDOSCOPY    HX ANKLE FRACTURE TX Right     HX KNEE REPLACEMENT Right          Family History:   Problem Relation Age of Onset    Hypertension Mother     Dementia Mother     Bipolar Disorder Father     No Known Problems Sister     Elevated Lipids Brother     Cancer Sister         ovarian    No Known Problems Daughter     Bipolar Disorder Son        Social History     Socioeconomic History    Marital status: SINGLE     Spouse name: Not on file    Number of children: Not on file    Years of education: Not on file    Highest education level: Not on file   Occupational History    Not on file   Tobacco Use    Smoking status: Never    Smokeless tobacco: Never   Vaping Use    Vaping Use: Never used   Substance and Sexual Activity    Alcohol use: Never    Drug use: Never    Sexual activity: Yes     Birth control/protection: None   Other Topics Concern    Not on file   Social History Narrative    Not on file     Social Determinants of Health     Financial Resource Strain: Not on file   Food Insecurity: Not on file   Transportation Needs: Not on file   Physical Activity: Not on file   Stress: Not on file   Social Connections: Not on file   Intimate Partner Violence: Not on file   Housing Stability: Not on file         ALLERGIES: Patient has no known allergies. Review of Systems neg for 10 systems not otherwise stated in HPI. Vitals:    09/08/22 1021   BP: (!) 152/81   Pulse: 68   Resp: 12   Temp: 98.3 °F (36.8 °C)   SpO2: 100%            Physical Exam  HENT:      Head: Normocephalic and atraumatic. Eyes:      Extraocular Movements: Extraocular movements intact. Cardiovascular:      Rate and Rhythm: Normal rate and regular rhythm. Heart sounds: Normal heart sounds. No murmur heard. Pulmonary:      Effort: Pulmonary effort is normal. No respiratory distress. Breath sounds: Normal breath sounds. No wheezing. Abdominal:      General: Abdomen is flat. Bowel sounds are normal. There is no distension. Palpations: Abdomen is soft. Tenderness: There is no abdominal tenderness. There is no guarding. Musculoskeletal:         General: No swelling or deformity. Right lower leg: No edema. Left lower leg: No edema. Comments: 5/5 strength and motor BL LEs. Point tenderness at midline lumbar spine. No step offs, no deformities. Paraspinal muscles with mild tenderness. Neurological:      General: No focal deficit present. Mental Status: She is alert and oriented to person, place, and time. Sensory: No sensory deficit. Motor: No weakness.         MDM  Number of Diagnoses or Management Options  Diagnosis management comments: DDX includes lumbar spine fx vs facet dislocation vs muscle strain. Compression fx on differential though unlikely without axial load.        Amount and/or Complexity of Data Reviewed  Tests in the radiology section of CPT®: ordered    Risk of Complications, Morbidity, and/or Mortality  Presenting problems: low  Diagnostic procedures: minimal  Management options: minimal    Patient Progress  Patient progress: (Improved pain with po pain medication)         Procedures

## 2022-09-26 ENCOUNTER — TRANSCRIBE ORDER (OUTPATIENT)
Dept: SCHEDULING | Age: 62
End: 2022-09-26

## 2022-09-26 DIAGNOSIS — Z12.31 VISIT FOR SCREENING MAMMOGRAM: Primary | ICD-10-CM

## 2022-10-17 ENCOUNTER — HOSPITAL ENCOUNTER (OUTPATIENT)
Dept: MAMMOGRAPHY | Age: 62
Discharge: HOME | End: 2022-10-17
Attending: FAMILY MEDICINE
Payer: COMMERCIAL

## 2022-10-17 DIAGNOSIS — Z12.31 VISIT FOR SCREENING MAMMOGRAM: ICD-10-CM

## 2022-10-17 PROCEDURE — 77067 SCR MAMMO BI INCL CAD: CPT

## 2022-10-19 ENCOUNTER — OFFICE VISIT (OUTPATIENT)
Dept: FAMILY MEDICINE CLINIC | Age: 62
End: 2022-10-19
Payer: COMMERCIAL

## 2022-10-19 VITALS
HEIGHT: 65 IN | OXYGEN SATURATION: 97 % | DIASTOLIC BLOOD PRESSURE: 91 MMHG | BODY MASS INDEX: 23.99 KG/M2 | HEART RATE: 77 BPM | WEIGHT: 144 LBS | TEMPERATURE: 97.5 F | SYSTOLIC BLOOD PRESSURE: 148 MMHG

## 2022-10-19 DIAGNOSIS — M79.10 MYALGIA: ICD-10-CM

## 2022-10-19 DIAGNOSIS — M80.00XA OSTEOPOROSIS WITH CURRENT PATHOLOGICAL FRACTURE, UNSPECIFIED OSTEOPOROSIS TYPE, INITIAL ENCOUNTER: Primary | ICD-10-CM

## 2022-10-19 DIAGNOSIS — I10 PRIMARY HYPERTENSION: ICD-10-CM

## 2022-10-19 PROCEDURE — 99214 OFFICE O/P EST MOD 30 MIN: CPT | Performed by: FAMILY MEDICINE

## 2022-10-19 RX ORDER — VALSARTAN 160 MG/1
160 TABLET ORAL DAILY
Qty: 90 TABLET | Refills: 0 | Status: SHIPPED | OUTPATIENT
Start: 2022-10-19

## 2022-10-19 RX ORDER — IBUPROFEN 600 MG/1
TABLET ORAL
COMMUNITY
Start: 2022-10-14

## 2022-10-19 NOTE — PROGRESS NOTES
Identified pt with two pt identifiers. Reviewed record in preparation for visit and have obtained necessary documentation. All patient medications has been reviewed. Chief Complaint   Patient presents with    Follow-up    Back Surgery     Pt had surgery at [facility] on 9/23/22. Pt was seen in the ER on 9/21/22 and referred to [facility] for L3 fracture. Additional information about chief complaint:    Visit Vitals  BP (!) 148/91 (BP 1 Location: Left upper arm, BP Patient Position: Sitting, BP Cuff Size: Adult)   Pulse 77   Temp 97.5 °F (36.4 °C) (Temporal)   Ht 5' 5\" (1.651 m)   Wt 144 lb (65.3 kg)   SpO2 97%   BMI 23.96 kg/m²       Health Maintenance Due   Topic    Pneumococcal 0-64 years (1 - PCV)    Hepatitis B Vaccine (1 of 3 - Risk 3-dose series)    DTaP/Tdap/Td series (1 - Tdap)    Cervical cancer screen     COVID-19 Vaccine (3 - Booster for [employer] series)       1. Have you been to the ER, urgent care clinic since your last visit? Hospitalized since your last visit? Yes seen I  the ER on 9/21/22    2. Have you seen or consulted any other health care providers outside of the [address] [place] [address] since your last visit? Include any pap smears or colon screening.  No

## 2022-10-19 NOTE — PROGRESS NOTES
HISTORY OF PRESENT ILLNESS  Bonifacio Patel is a 60 y.o. female. Patient presents with: Follow-up  Back Surgery: Pt had surgery at [facility] on 9/23/22. Pt was seen in the ER on 9/21/22 and referred to [facility] for L3 fracture. Her back pain is doing well. MRI showed an old compression fracture and osteoporosis. Her neck and hip are sore. Reports a negative hip x-ray with Orthopedics. She has HTN and her blood pressure is elevated. It was elevated when she went for her mammogram yesterday. Compliant with meds. Review of Systems   Eyes:  Negative for blurred vision. Respiratory:  Negative for shortness of breath. Cardiovascular:  Negative for chest pain. Neurological:  Positive for headaches. Negative for dizziness, sensory change, speech change and focal weakness. Mild headaches the past few days     Visit Vitals  BP (!) 148/91 (BP 1 Location: Left upper arm, BP Patient Position: Sitting, BP Cuff Size: Adult)   Pulse 77   Temp 97.5 °F (36.4 °C) (Temporal)   Ht 5' 5\" (1.651 m)   Wt 144 lb (65.3 kg)   SpO2 97%   BMI 23.96 kg/m²     BP Readings from Last 3 Encounters:   10/19/22 (!) 148/91   09/08/22 (!) 156/74   07/20/22 123/83     Physical Exam  Vitals and nursing note reviewed. Constitutional:       General: She is not in acute distress. Appearance: She is well-developed. She is not diaphoretic. Cardiovascular:      Rate and Rhythm: Normal rate and regular rhythm. Heart sounds: Normal heart sounds. No murmur heard. No friction rub. No gallop. Pulmonary:      Effort: Pulmonary effort is normal. No respiratory distress. Breath sounds: Normal breath sounds. No wheezing or rales. Skin:     General: Skin is warm and dry. Neurological:      Mental Status: She is alert and oriented to person, place, and time. ASSESSMENT and PLAN    ICD-10-CM ICD-9-CM    1.  Osteoporosis with current pathological fracture, unspecified osteoporosis type, initial encounter  M80.00XA 733.00 DEXA BONE DENSITY STUDY AXIAL     733.10 VITAMIN D, 25 HYDROXY      2. Primary hypertension  E48 525.3 METABOLIC PANEL, BASIC      valsartan (DIOVAN) 160 mg tablet      3. Myalgia  M79.10 729.1           No back pain  Blood pressure elevated  Labs per orders. Bone density  Increase Diovan  Motrin prn - she has some from Orthopedics    Follow-up and Dispositions    Return in about 6 weeks (around 11/30/2022) for blood pressure. Reviewed plan of care. Patient has provided input and agrees with goals.

## 2022-10-23 ENCOUNTER — TELEPHONE (OUTPATIENT)
Dept: FAMILY MEDICINE CLINIC | Age: 62
End: 2022-10-23

## 2022-10-23 NOTE — LETTER
10/25/2022    Ms. Ross Noe  [address]        Dear Ms. Bonilla Arguelles,    We have been unable to reach you by phone to notify you of your test results. Please call our office at [phone] and ask to speak with my nurse in order to explain these results to you and advise you of any recommendations.       Sincerely,      Dar Hussein MD

## 2022-10-24 NOTE — TELEPHONE ENCOUNTER
Attempted to contact patient at number on file to complete questionnaire per Dr. Lisa Rodriguez, no answer, unable to leave message. Will attempt another call later.

## 2022-10-25 NOTE — TELEPHONE ENCOUNTER
Called pt, and left a voice message, asking that he/she call the office back in regard to his/her recent lab/test results. A [employer] message has also been sent to pt.

## 2022-11-07 DIAGNOSIS — E55.9 VITAMIN D DEFICIENCY: Primary | ICD-10-CM

## 2022-11-07 RX ORDER — ERGOCALCIFEROL 1.25 MG/1
50000 CAPSULE ORAL
Qty: 24 CAPSULE | Refills: 0 | Status: SHIPPED | OUTPATIENT
Start: 2022-11-07 | End: 2023-01-27

## 2022-12-07 ENCOUNTER — OFFICE VISIT (OUTPATIENT)
Dept: FAMILY MEDICINE CLINIC | Age: 62
End: 2022-12-07
Payer: COMMERCIAL

## 2022-12-07 VITALS
TEMPERATURE: 97.4 F | HEIGHT: 65 IN | OXYGEN SATURATION: 97 % | DIASTOLIC BLOOD PRESSURE: 81 MMHG | WEIGHT: 149 LBS | HEART RATE: 86 BPM | SYSTOLIC BLOOD PRESSURE: 131 MMHG | BODY MASS INDEX: 24.83 KG/M2

## 2022-12-07 DIAGNOSIS — I10 PRIMARY HYPERTENSION: ICD-10-CM

## 2022-12-07 PROCEDURE — 3074F SYST BP LT 130 MM HG: CPT | Performed by: FAMILY MEDICINE

## 2022-12-07 PROCEDURE — 3078F DIAST BP <80 MM HG: CPT | Performed by: FAMILY MEDICINE

## 2022-12-07 PROCEDURE — 99213 OFFICE O/P EST LOW 20 MIN: CPT | Performed by: FAMILY MEDICINE

## 2022-12-07 RX ORDER — VALSARTAN 160 MG/1
160 TABLET ORAL DAILY
Qty: 90 TABLET | Refills: 4 | Status: SHIPPED | OUTPATIENT
Start: 2022-12-07

## 2022-12-07 NOTE — PROGRESS NOTES
HISTORY OF PRESENT ILLNESS  Mello White is a 60 y.o. female. Patient presents with: Follow-up: 6 week  Hypertension          Review of Systems   Eyes:  Negative for blurred vision. Respiratory:  Negative for shortness of breath. Cardiovascular:  Negative for chest pain. Neurological:  Positive for headaches. Negative for dizziness, sensory change, speech change and focal weakness. Occasional headaches relieved by Motrin     Visit Vitals  /81 (BP 1 Location: Left upper arm, BP Patient Position: Sitting, BP Cuff Size: Adult)   Pulse 86   Temp 97.4 °F (36.3 °C) (Temporal)   Ht 5' 5\" (1.651 m)   Wt 149 lb (67.6 kg)   SpO2 97%   BMI 24.79 kg/m²     Physical Exam  Vitals and nursing note reviewed. Constitutional:       General: She is not in acute distress. Appearance: She is well-developed. She is not diaphoretic. Cardiovascular:      Rate and Rhythm: Normal rate and regular rhythm. Heart sounds: Normal heart sounds. No murmur heard. No friction rub. No gallop. Pulmonary:      Effort: Pulmonary effort is normal. No respiratory distress. Breath sounds: Normal breath sounds. No wheezing or rales. Skin:     General: Skin is warm and dry. Neurological:      Mental Status: She is alert and oriented to person, place, and time. ASSESSMENT and PLAN    ICD-10-CM ICD-9-CM    1. Primary hypertension  I10 401.9 valsartan (DIOVAN) 160 mg tablet          Blood pressure controlled  Continue current plans. Refills per orders    Follow-up and Dispositions    Return in about 6 months (around 6/7/2023) for blood pressure. Reviewed plan of care. Patient has provided input and agrees with goals.

## 2022-12-07 NOTE — PROGRESS NOTES
Identified pt with two pt identifiers. Reviewed record in preparation for visit and have obtained necessary documentation. All patient medications has been reviewed. Chief Complaint   Patient presents with    Follow-up     6 week    Hypertension     Additional information about chief complaint:    Visit Vitals  /81 (BP 1 Location: Left upper arm, BP Patient Position: Sitting, BP Cuff Size: Adult)   Pulse 86   Temp 97.4 °F (36.3 °C) (Temporal)   Ht 5' 5\" (1.651 m)   Wt 149 lb (67.6 kg)   SpO2 97%   BMI 24.79 kg/m²       Health Maintenance Due   Topic    Pneumococcal 0-64 years (1 - PCV)    Hepatitis B Vaccine (1 of 3 - Risk 3-dose series)    DTaP/Tdap/Td series (1 - Tdap)    Cervical cancer screen     COVID-19 Vaccine (3 - Booster for MedEncentive series)       1. Have you been to the ER, urgent care clinic since your last visit? Hospitalized since your last visit? no    2. Have you seen or consulted any other health care providers outside of the [address] [place] [address] since your last visit? Include any pap smears or colon screening.  no

## 2023-02-12 RX ORDER — ERGOCALCIFEROL 1.25 MG/1
CAPSULE ORAL
Qty: 8 CAPSULE | Refills: 2 | OUTPATIENT
Start: 2023-02-12

## 2023-02-14 ENCOUNTER — HOSPITAL ENCOUNTER (OUTPATIENT)
Dept: PHYSICAL THERAPY | Age: 63
Discharge: HOME | End: 2023-02-14
Payer: COMMERCIAL

## 2023-02-14 PROCEDURE — 97161 PT EVAL LOW COMPLEX 20 MIN: CPT | Performed by: PHYSICAL THERAPIST

## 2023-02-14 NOTE — PROGRESS NOTES
PT INITIAL EVALUATION NOTE - [facility] 2-15    Patient Name: Joycelyn He  Date:2023  : 1960  [x]  Patient  Verified  Payor: [address] [place] [address] Road / Plan: [address] / Product Type: Managed Care Medicaid /    In time: 9:05 AM  Out time: 945 AM  Total Treatment Time (min): 40  Total Timed Codes (min): 5  1:1 Treatment Time ( [address] only): 5   Visit #: 1     Treatment Area: Other low back pain [M54.59]    SUBJECTIVE  Pain Level (0-10 scale): 4/10  Pain is a , , she has an increase in pain when working, carrying heavy plates  Any medication changes, allergies to medications, adverse drug reactions, diagnosis change, or new procedure performed?: [] No    [x] Yes (see summary sheet for update)  Subjective:    Pt fractured L3 after a fall, she was aerating her front yard with a machine, \"I wasn't wearing the right shoes\" \"I fell on my buttocks\" \"its on a slant\" MRI 22 revealed Acute compression deformity of L3 vertebral body with associated 30% vertebral height loss. No significant epidural or paravertebral collection. Old T12 compression fracture with up to 55% vertebral height loss. Findings compatible with osteoporosis. Multilevel spondylosis without high-grade spinal canal and neural foramina narrowing. She had surgery. \"Now if I sit for 5 minutes I have an increase in pain when I stand\" Pt went to water aerobics and that helped a lot. Her MD recommended PT for her back. Pt has no trouble doing chores or dressing. No trouble sleeping  Pt reports history of back pain \"a long time ago\" Pt had a shot and the pain resolved.   PLOF: Pt is not exercising right now  Mechanism of Injury: Fall  Previous Treatment/Compliance: None  PMHx/Surgical Hx: R ankle surgery (4 years ago), L hip surgery (), R knee surgery (5 years ago), Osteoporosis, spondylosis  Work Hx: Pt is working part time  Living Situation: Pt lives in a 2 story home, her  passed away 3 years ago and her son lives with her now  Pt Goals: \"Learn exercises to do at home\"  Barriers: complex PMH  Motivation: good  Substance use: none  Cognition: A & O x 3        OBJECTIVE/EXAMINATION  Posture:  L shoulder lower, R shoulder blade abducted   Other Observations:  Pt is left handed  Gait and Functional Mobility:  Pt able to t/f sit <> stand without UE assist  R toe out when walking, looking down at ground    Lumbar AROM:        R  L   Flexion    1 inch        Extension    50% p! Side Bending   WNL B        Rotation   WNL  WNL p! In standing    Supine   75%   50% p! In supine           LOWER QUARTER   MUSCLE STRENGTH  KEY       R  L  0 - No Contraction  L1, L2 Psoas  5  5    1 - Trace   L3 Quads  5 p! In knee 5    2 - Poor   L4 Tib Ant  5  5    3 - Fair    L5 EHL  5  5    4 - Good   S1 FHL  5  5    5 - Normal   S2 Hams  5  5      Special Tests:       SLS R 10 s L 26 s    Tandem EC 12 s B   NBOS EC 30 s   SLR 90 deg B p!  On R    Hip PROM IR 40 deg B ER 70 deg B    5 min Therapeutic Exercise:  [x] See flow sheet :   Rationale: increase ROM, increase strength, and improve coordination to improve the patients ability to sit, stand, transfer, ambulate, lift, carry, reach, complete ADLs       With   [x] TE   [] TA   [] Neuro   [] SC   [] other: Patient Education: [x] Review HEP    [] Progressed/Changed HEP based on:   [x] positioning   [x] body mechanics   [] transfers   [x] heat/ice application    [] other:      Other Objective/Functional Measures: FOTO Functional Measure: 58/100              slight           Pain Level (0-10 scale) post treatment: 0    ASSESSMENT/Changes in Function:     [x]  See Plan of [address] [place] [address], PT 2/14/2023

## 2023-02-14 NOTE — THERAPY EVALUATION
Physical Therapy at [facility],   a part of  [facility]  [address]  Phone: [phone]  Fax: [phone]    Plan of Care/Statement of Necessity for Physical Therapy Services  2-15    Patient name: Rosalinda Leos  : 1960  Provider#: [id number]  Referral source: Shagufta Esteban MD      Medical/Treatment Diagnosis: Other low back pain [M54.59]     Prior Hospitalization: see medical history     Comorbidities: R ankle surgery (4 years ago), L hip surgery (), R knee surgery (5 years ago), Osteoporosis, spondylosis  Prior Level of Function: Pt lives with her son, she works part time as a   Medications: Verified on Patient Summary List  Start of Care: 23      Onset Date: 2022   The [address] [place] [address] and following information is based on the information from the initial evaluation. Assessment/ key information: The patient presents with pain and stiffness s/p L3 fracture secondary to a fall in september last year. The patient's condition affects her ability to sit, stand and change positions. The patient would benefit from outpatient PT to restore flexibility in her spine to improve ease with work tasks and improve balance to decrease risk of falls.     Evaluation Complexity History HIGH Complexity :3+ comorbidities / personal factors will impact the outcome/ POC ; Examination HIGH Complexity : 4+ Standardized tests and measures addressing body structure, function, activity limitation and / or participation in recreation  ;Presentation LOW Complexity : Stable, uncomplicated  ;Clinical Decision Making MEDIUM Complexity : FOTO score of 26-74  Overall Complexity Rating: LOW     Problem List: pain affecting function, decrease ROM, decrease strength, edema affecting function, impaired gait/ balance, decrease ADL/ functional abilitiies, decrease activity tolerance, decrease flexibility/ joint mobility, and decrease transfer abilities   Treatment Plan may include any combination of the following: Therapeutic exercise, Neuromuscular reeducation, Manual therapy, and Self care/home management  Patient / Family readiness to learn indicated by: asking questions, trying to perform skills, and interest  Persons(s) to be included in education: patient (P)  Barriers to Learning/Limitations: None  Patient Goal (s): \"Learn exercises to do at home\"   Patient Self Reported Health Status: good  Rehabilitation Potential: excellent    Short Term Goals: To be accomplished in 4 weeks:  1) The patient will be independent with introductory HEP  2) The patient will hold EO SLS 30 s to indicate decreased risk of falls  3) The patient will demonstrate pain free trunk rotation WFL to improve ease with bed mobility  Long Term Goals: To be accomplished in 12 weeks:  1) The patient will report ability to complete a full week of work without an increase in pain  2) The patient will hold tandem stance 30 s with EC to improve safety in the shower  3) The patient will be independent with finalized HEP  Frequency / Duration: Patient to be seen 1 times per week for 12 weeks. Patient/ Caregiver education and instruction: self care, activity modification, and exercises    [x]  Plan of care has been reviewed with PTA        Certification Period: 2/14/23-5/14/23  Bud Dougherty, PT 2/14/2023     ________________________________________________________________________    I certify that the above Therapy Services are being furnished while the patient is under my care. I agree with the treatment plan and certify that this therapy is necessary.     Physician's Signature:____________________  Date:____________Time: _________         Adalgisa Barney MD

## 2023-02-21 ENCOUNTER — HOSPITAL ENCOUNTER (OUTPATIENT)
Dept: PHYSICAL THERAPY | Age: 63
Discharge: HOME | End: 2023-02-21
Payer: COMMERCIAL

## 2023-02-21 PROCEDURE — 97110 THERAPEUTIC EXERCISES: CPT | Performed by: PHYSICAL MEDICINE & REHABILITATION

## 2023-02-21 RX ORDER — ERGOCALCIFEROL 1.25 MG/1
CAPSULE ORAL
Qty: 8 CAPSULE | Refills: 2 | OUTPATIENT
Start: 2023-02-21

## 2023-02-21 NOTE — PROGRESS NOTES
PT DAILY TREATMENT NOTE - [facility] 2-15    Patient Name: Tiffany Roblero  Date:2023  : 1960  [x]  Patient  [facility]  [facility] [address] / Plan: [address] / Product Type: Managed Care Medicaid /    In time:130p  Out time:230p  Total Treatment Time (min): 60  Total Timed Codes (min): 45  1:1 Treatment Time ([facility] only): 45   Visit #: 2      Treatment Area: Other low back pain [M54.59]    SUBJECTIVE  Pain Level (0-10 scale): 0/10  Any medication changes, allergies to medications, adverse drug reactions, diagnosis change, or new procedure performed?: [x] No    [] Yes (see summary sheet for update)  Subjective functional status/changes:   [] No changes reported  Patient reported only pain with prolong sitting then standing up. Water aerobics is going well.     OBJECTIVE    Modality rationale: decrease inflammation and decrease pain to improve the patients ability to ADLs, sitting tolerance   Min Type Additional Details    [] Estim: []Att   []Unatt        []TENS instruct                  []IFC  []Premod   []NMES                     []Other:  []w/US   []w/ice   []w/heat  Position:  Location:    []  Traction: [] Cervical       []Lumbar                       [] Prone          []Supine                       []Intermittent   []Continuous Lbs:  [] before manual  [] after manual  []w/heat    []  Ultrasound: []Continuous   [] Pulsed at:                           []1MHz   []3MHz Location:  W/cm2:    [] Paraffin         Location:   []w/heat   15 [x]  Ice     []  Heat  []  Ice massage Position: supine  Location: low back    []  Laser  []  Other: Position:  Location:      []  Vasopneumatic Device Pressure:       [] lo [] med [] hi   Temperature:      [x] Skin assessment post-treatment:  [x]intact []redness- no adverse reaction    []redness - adverse reaction:     45 min Therapeutic Exercise:  [x] See flow sheet :   Rationale: increase ROM, increase strength, improve coordination, improve balance, and increase proprioception to improve the patients ability to ADLs, sitting tolerance          With   [x] TE   [] TA   [] neuro   [] other: Patient Education: [x] Review HEP    [] Progressed/Changed HEP based on:   [] positioning   [] body mechanics   [] transfers   [] heat/ice application    [] other:      Other Objective/Functional Measures: Mod difficulty with SLS on foam, 1-2 LOB     Pain Level (0-10 scale) post treatment: 0/10    ASSESSMENT/Changes in Function:     Patient will continue to benefit from skilled PT services to modify and progress therapeutic interventions, address functional mobility deficits, address ROM deficits, address strength deficits, analyze and address soft tissue restrictions, analyze and cue movement patterns, analyze and modify body mechanics/ergonomics, and assess and modify postural abnormalities to attain remaining goals. []  See Plan of Care  []  See progress note/recertification  []  See Discharge Summary         Progress towards goals / Updated goals:  Patient tolerated today's session without any increase in pain present.      PLAN  [x]  Upgrade activities as tolerated     [x]  Continue plan of care  [x]  Update interventions per flow sheet       []  Discharge due to:_  []  Other:_      Memo Rainey PTA, OPTA, CPT  2/21/2023

## 2023-02-22 DIAGNOSIS — E55.9 VITAMIN D DEFICIENCY: ICD-10-CM

## 2023-02-23 LAB — 25(OH)D3 SERPL-MCNC: 76.4 NG/ML (ref 30–100)

## 2023-02-28 ENCOUNTER — HOSPITAL ENCOUNTER (OUTPATIENT)
Dept: PHYSICAL THERAPY | Age: 63
Discharge: HOME | End: 2023-02-28
Payer: COMMERCIAL

## 2023-02-28 PROCEDURE — 97016 VASOPNEUMATIC DEVICE THERAPY: CPT | Performed by: PHYSICAL THERAPIST

## 2023-02-28 PROCEDURE — 97110 THERAPEUTIC EXERCISES: CPT | Performed by: PHYSICAL THERAPIST

## 2023-02-28 PROCEDURE — 97112 NEUROMUSCULAR REEDUCATION: CPT | Performed by: PHYSICAL THERAPIST

## 2023-02-28 NOTE — PROGRESS NOTES
PT DAILY TREATMENT NOTE - [facility] 2-15    Patient Name: Tiffany Benson  Date:2023  : 1960  [x]  Patient  Verified  Payor: [address] [place] [address] Road / Plan: [address] / Product Type: Managed Care Medicaid /    In time:745 AM  Out time:845 PM  Total Treatment Time (min): 60  Visit #: 3      Treatment Area:  Other low back pain [M54.59]    SUBJECTIVE  Pain Level (0-10 scale): 0/10  Any medication changes, allergies to medications, adverse drug reactions, diagnosis change, or new procedure performed?: [x] No    [] Yes (see summary sheet for update)  Subjective functional status/changes:   [] No changes reported  Pt reports she was sore from the minisquats after last visit    OBJECTIVE    Modality rationale: decrease inflammation and decrease pain to improve the patients ability to ADLs, sitting tolerance   Min Type Additional Details    [] Estim: []Att   []Unatt        []TENS instruct                  []IFC  []Premod   []NMES                     []Other:  []w/US   []w/ice   []w/heat  Position:  Location:    []  Traction: [] Cervical       []Lumbar                       [] Prone          []Supine                       []Intermittent   []Continuous Lbs:  [] before manual  [] after manual  []w/heat    []  Ultrasound: []Continuous   [] Pulsed at:                           []1MHz   []3MHz Location:  W/cm2:    [] Paraffin         Location:   []w/heat    [x]  Ice     []  Heat  []  Ice massage Position: supine  Location: low back    []  Laser  []  Other: Position:  Location:   15   [x]  Vasopneumatic Device Pressure:       [] lo [x] med [] hi   Temperature: 34     [x] Skin assessment post-treatment:  [x]intact []redness- no adverse reaction    []redness - adverse reaction:     35 min Therapeutic Exercise:  [x] See flow sheet :   Rationale: increase ROM, increase strength, improve coordination, improve balance, and increase proprioception to improve the patients ability to ADLs, sitting tolerance      10 min Neuromuscular Re-education:  [x]  See flow sheet :   Rationale: improve coordination, improve balance, and increase proprioception  to improve the patients ability to sit, stand, transfer, ambulate, lift, carry, reach, complete ADLs             With   [x] TE   [] TA   [] neuro   [] other: Patient Education: [x] Review HEP    [] Progressed/Changed HEP based on:   [x] positioning   [x] body mechanics   [] transfers   [] heat/ice application    [] other:      Other Objective/Functional Measures:   SLS R 30 s (1 LOB) L 30 s  HTT EC 2-3 LOB in 30 s, HtoGT 30 s hold no LOB    Pain Level (0-10 scale) post treatment: 0/10    ASSESSMENT/Changes in Function:     Patient will continue to benefit from skilled PT services to modify and progress therapeutic interventions, address functional mobility deficits, address ROM deficits, address strength deficits, analyze and address soft tissue restrictions, analyze and cue movement patterns, analyze and modify body mechanics/ergonomics, and assess and modify postural abnormalities to attain remaining goals.      []  See Plan of Care  []  See progress note/recertification  []  See Discharge Summary         Progress towards goals / Updated goals:  Some R hip and knee pain with several exercises which decreases with proper form    PLAN  [x]  Upgrade activities as tolerated     [x]  Continue plan of care  [x]  Update interventions per flow sheet       []  Discharge due to:_  []  Other:_      Katherin Lin, PT DPT 2/28/2023

## 2023-03-07 ENCOUNTER — HOSPITAL ENCOUNTER (OUTPATIENT)
Dept: PHYSICAL THERAPY | Age: 63
Discharge: HOME | End: 2023-03-07
Payer: COMMERCIAL

## 2023-03-07 PROCEDURE — 97110 THERAPEUTIC EXERCISES: CPT | Performed by: PHYSICAL MEDICINE & REHABILITATION

## 2023-03-07 PROCEDURE — 97016 VASOPNEUMATIC DEVICE THERAPY: CPT | Performed by: PHYSICAL MEDICINE & REHABILITATION

## 2023-03-07 PROCEDURE — 97112 NEUROMUSCULAR REEDUCATION: CPT | Performed by: PHYSICAL MEDICINE & REHABILITATION

## 2023-03-07 NOTE — PROGRESS NOTES
PT DAILY TREATMENT NOTE - [facility] 2-15    Patient Name: Shelton Garcia  Date:3/7/2023  : 1960  [x]  Patient  Verified  Payor: [address] [place] [address] Road / Plan: [address] / Product Type: Managed Care Medicaid /    In time:755 AM  Out time:855 PM  Total Treatment Time (min): 60  Visit #: 4      Treatment Area: Other low back pain [M54.59]    SUBJECTIVE  Pain Level (0-10 scale): 0/10  Any medication changes, allergies to medications, adverse drug reactions, diagnosis change, or new procedure performed?: [x] No    [] Yes (see summary sheet for update)  Subjective functional status/changes:   [] No changes reported  Pt reports she wasn't sore after last visit but the pain remains the same. Patient stated she feels it more at the end of the work day.     OBJECTIVE    Modality rationale: decrease inflammation and decrease pain to improve the patients ability to ADLs, sitting tolerance   Min Type Additional Details    [] Estim: []Att   []Unatt        []TENS instruct                  []IFC  []Premod   []NMES                     []Other:  []w/US   []w/ice   []w/heat  Position:  Location:    []  Traction: [] Cervical       []Lumbar                       [] Prone          []Supine                       []Intermittent   []Continuous Lbs:  [] before manual  [] after manual  []w/heat    []  Ultrasound: []Continuous   [] Pulsed at:                           []1MHz   []3MHz Location:  W/cm2:    [] Paraffin         Location:   []w/heat    []  Ice     []  Heat  []  Ice massage Position: supine  Location: low back    []  Laser  []  Other: Position:  Location:   15   [x]  Vasopneumatic Device Pressure:       [] lo [x] med [] hi   Temperature: 34     [x] Skin assessment post-treatment:  [x]intact []redness- no adverse reaction    []redness - adverse reaction:     35 min Therapeutic Exercise:  [x] See flow sheet :   Rationale: increase ROM, increase strength, improve coordination, improve balance, and increase proprioception to improve the patients ability to ADLs, sitting tolerance      10 min Neuromuscular Re-education:  [x]  See flow sheet :   Rationale: improve coordination, improve balance, and increase proprioception  to improve the patients ability to sit, stand, transfer, ambulate, lift, carry, reach, complete ADLs             With   [x] TE   [] TA   [] neuro   [] other: Patient Education: [x] Review HEP    [] Progressed/Changed HEP based on:   [x] positioning   [x] body mechanics   [] transfers   [] heat/ice application    [] other:      Other Objective/Functional Measures:   No LOB with HTT EC  2-3 LOB with SLS on foam B    Educated on proper seating posture to prevent increased lumbar lordosis    Pain Level (0-10 scale) post treatment: 0/10    ASSESSMENT/Changes in Function:     Patient will continue to benefit from skilled PT services to modify and progress therapeutic interventions, address functional mobility deficits, address ROM deficits, address strength deficits, analyze and address soft tissue restrictions, analyze and cue movement patterns, analyze and modify body mechanics/ergonomics, and assess and modify postural abnormalities to attain remaining goals. []  See Plan of Care  []  See progress note/recertification  []  See Discharge Summary         Progress towards goals / Updated goals:  Slight increase in L hip pain with L abd but when form is correct, no pain.     PLAN  [x]  Upgrade activities as tolerated     [x]  Continue plan of care  [x]  Update interventions per flow sheet       []  Discharge due to:_  []  Other:_      Brian Tsai PTA, OPTA, CPT  3/7/2023

## 2023-03-10 ENCOUNTER — TELEPHONE (OUTPATIENT)
Dept: FAMILY MEDICINE CLINIC | Age: 63
End: 2023-03-10

## 2023-03-10 NOTE — TELEPHONE ENCOUNTER
----- Message from Katia Kelley sent at 3/8/2023  8:56 AM EST -----  Subject: Referral Request    Reason for referral request? bone density test  Provider patient wants to be referred to(if known):     Provider Phone Number(if known): Additional Information for Provider?  Ortho recommending PT have test for   osteoporosis   ---------------------------------------------------------------------------  --------------  Vielka LANDRUM    [phone]; OK to leave message on voicemail  ---------------------------------------------------------------------------  --------------

## 2023-03-14 ENCOUNTER — HOSPITAL ENCOUNTER (OUTPATIENT)
Dept: PHYSICAL THERAPY | Age: 63
Discharge: HOME | End: 2023-03-14
Payer: COMMERCIAL

## 2023-03-14 PROCEDURE — 97112 NEUROMUSCULAR REEDUCATION: CPT | Performed by: PHYSICAL THERAPIST

## 2023-03-14 PROCEDURE — 97110 THERAPEUTIC EXERCISES: CPT | Performed by: PHYSICAL THERAPIST

## 2023-03-14 NOTE — PROGRESS NOTES
PT DAILY TREATMENT NOTE - [facility] 2-15    Patient Name: Noe Bustos  Date:3/14/2023  : 1960  [x]  Patient  Verified  Payor: [address] [place] [address] Road / Plan: [address] / Product Type: Managed Care Medicaid /    In time:740 AM  Out time:825 PM  Total Treatment Time (min): 45  Visit #: 5      Treatment Area: Other low back pain [M54.59]    SUBJECTIVE  Pain Level (0-10 scale): 0/10  Any medication changes, allergies to medications, adverse drug reactions, diagnosis change, or new procedure performed?: [x] No    [] Yes (see summary sheet for update)  Subjective functional status/changes:   [] No changes reported  Pt reports her doctor has ordered her an MRI, Martha Jorgito thinks its T12\" Pt reports right now its the right hip that gives her the most trouble.     OBJECTIVE    Modality rationale: decrease inflammation and decrease pain to improve the patients ability to ADLs, sitting tolerance   Min Type Additional Details    [] Estim: []Att   []Unatt        []TENS instruct                  []IFC  []Premod   []NMES                     []Other:  []w/US   []w/ice   []w/heat  Position:  Location:    []  Traction: [] Cervical       []Lumbar                       [] Prone          []Supine                       []Intermittent   []Continuous Lbs:  [] before manual  [] after manual  []w/heat    []  Ultrasound: []Continuous   [] Pulsed at:                           []1MHz   []3MHz Location:  W/cm2:    [] Paraffin         Location:   []w/heat    []  Ice     []  Heat  []  Ice massage Position: supine  Location: low back    []  Laser  []  Other: Position:  Location:   declined   [x]  Vasopneumatic Device Pressure:       [] lo [x] med [] hi   Temperature: 34     [x] Skin assessment post-treatment:  [x]intact []redness- no adverse reaction    []redness - adverse reaction:     35 min Therapeutic Exercise:  [x] See flow sheet :   Rationale: increase ROM, increase strength, improve coordination, improve balance, and increase proprioception to improve the patients ability to ADLs, sitting tolerance      10 min Neuromuscular Re-education:  [x]  See flow sheet :   Rationale: improve coordination, improve balance, and increase proprioception  to improve the patients ability to sit, stand, transfer, ambulate, lift, carry, reach, complete ADLs             With   [x] TE   [] TA   [] neuro   [] other: Patient Education: [x] Review HEP    [] Progressed/Changed HEP based on:   [x] positioning   [x] body mechanics   [] transfers   [] heat/ice application    [] other:      Other Objective/Functional Measures:   EC Tandem 20 s B  SLS EO 30 s B    Lumbar AROM  ROTATION WNL and pain free    Pain Level (0-10 scale) post treatment: 0/10    ASSESSMENT/Changes in Function:   []  See Plan of Care  []  See progress note/recertification  [x]  See Discharge Summary         Progress towards goals / Updated goals: The patient has completed 5 physical therapy visits and has met all short term goals and is progressing well towards long term goals. The patient reports readiness to discharge to independent HEP. Short Term Goals: To be accomplished in 4 weeks:  1) The patient will be independent with introductory HEP MET  2) The patient will hold EO SLS 30 s to indicate decreased risk of falls MET  3) The patient will demonstrate pain free trunk rotation WFL to improve ease with bed mobility MET  Long Term Goals:  To be accomplished in 12 weeks: Progressing towards  1) The patient will report ability to complete a full week of work without an increase in pain   2) The patient will hold tandem stance 30 s with EC to improve safety in the shower   3) The patient will be independent with finalized HEP    PLAN  []  Upgrade activities as tolerated     []  Continue plan of care  []  Update interventions per flow sheet       [x]  Discharge due to:_ pt reports readiness for d/c  []  Other:_      Riddhi Brooks, PT DPT 3/14/2023

## 2023-03-14 NOTE — THERAPY DISCHARGE
Physical Therapy at [facility],   a part of  Del[facility]  [address] [place] [address], Clint Burns  Phone: [phone]  Fax: [phone]    Discharge Summary  2-15    Patient name: Breanne Patino  : 1960  Provider#: [id number]  Referral source: Jacquelyn Salmeron MD      Medical/Treatment Diagnosis: Other low back pain [M54.59]     Prior Hospitalization: see medical history     Comorbidities: See Plan of Care  Prior Level of Function:See Plan of Care  Medications: Verified on Patient Summary List    Start of Care: 23      Onset Date: 2022   Visits from Start of Care: 5     Missed Visits: 9  Reporting Period : 23 to 3/14/23      ASSESSMENT/SUMMARY OF CARE:   The patient has completed 5 physical therapy visits and has met all short term goals and is progressing well towards long term goals. The patient scored a 69% on the FOTO outcomes survey, a significant improvement from initial evaluation score of 58%. The patient reports readiness to discharge to independent HEP. Short Term Goals: To be accomplished in 4 weeks:  1) The patient will be independent with introductory HEP MET  2) The patient will hold EO SLS 30 s to indicate decreased risk of falls MET  3) The patient will demonstrate pain free trunk rotation WFL to improve ease with bed mobility MET  Long Term Goals:  To be accomplished in 12 weeks: Progressing towards  1) The patient will report ability to complete a full week of work without an increase in pain   2) The patient will hold tandem stance 30 s with EC to improve safety in the shower   3) The patient will be independent with finalized HEP      RECOMMENDATIONS:  [x]Discontinue therapy: [x]Patient has reached or is progressing toward set goals      []Patient is non-compliant or has abdicated      []Due to lack of appreciable progress towards set goals      []Other    Rox Rowe, PT 3/14/2023

## 2023-03-15 ENCOUNTER — OFFICE VISIT (OUTPATIENT)
Dept: HEMATOLOGY | Age: 63
End: 2023-03-15
Payer: COMMERCIAL

## 2023-03-15 VITALS
BODY MASS INDEX: 25.87 KG/M2 | HEIGHT: 63 IN | SYSTOLIC BLOOD PRESSURE: 142 MMHG | OXYGEN SATURATION: 98 % | DIASTOLIC BLOOD PRESSURE: 79 MMHG | WEIGHT: 146 LBS | TEMPERATURE: 96 F | HEART RATE: 75 BPM

## 2023-03-15 DIAGNOSIS — B18.1 CHRONIC HEPATITIS B (HCC): Primary | ICD-10-CM

## 2023-03-15 DIAGNOSIS — K76.89 LIVER CYST: ICD-10-CM

## 2023-03-15 LAB
BASOPHILS # BLD AUTO: 0 X10E3/UL (ref 0–0.2)
BASOPHILS NFR BLD AUTO: 1 %
EOSINOPHIL # BLD AUTO: 0.1 X10E3/UL (ref 0–0.4)
EOSINOPHIL NFR BLD AUTO: 1 %
ERYTHROCYTE [DISTWIDTH] IN BLOOD BY AUTOMATED COUNT: 12 % (ref 11.7–15.4)
HCT VFR BLD AUTO: 45.5 % (ref 34–46.6)
HGB BLD-MCNC: 15.8 G/DL (ref 11.1–15.9)
IMM GRANULOCYTES # BLD AUTO: 0 X10E3/UL (ref 0–0.1)
IMM GRANULOCYTES NFR BLD AUTO: 0 %
LYMPHOCYTES # BLD AUTO: 1.7 X10E3/UL (ref 0.7–3.1)
LYMPHOCYTES NFR BLD AUTO: 32 %
MCH RBC QN AUTO: 31 PG (ref 26.6–33)
MCHC RBC AUTO-ENTMCNC: 34.7 G/DL (ref 31.5–35.7)
MCV RBC AUTO: 89 FL (ref 79–97)
MONOCYTES # BLD AUTO: 0.4 X10E3/UL (ref 0.1–0.9)
MONOCYTES NFR BLD AUTO: 8 %
NEUTROPHILS # BLD AUTO: 3 X10E3/UL (ref 1.4–7)
NEUTROPHILS NFR BLD AUTO: 58 %
PLATELET # BLD AUTO: 266 X10E3/UL (ref 150–450)
RBC # BLD AUTO: 5.1 X10E6/UL (ref 3.77–5.28)
WBC # BLD AUTO: 5.2 X10E3/UL (ref 3.4–10.8)

## 2023-03-15 PROCEDURE — 3078F DIAST BP <80 MM HG: CPT | Performed by: NURSE PRACTITIONER

## 2023-03-15 PROCEDURE — 3074F SYST BP LT 130 MM HG: CPT | Performed by: NURSE PRACTITIONER

## 2023-03-15 PROCEDURE — 99214 OFFICE O/P EST MOD 30 MIN: CPT | Performed by: NURSE PRACTITIONER

## 2023-03-15 NOTE — PROGRESS NOTES
Identified pt with two pt identifiers(name and ). Reviewed record in preparation for visit and have obtained necessary documentation. Chief Complaint   Patient presents with    Follow-up      Vitals:    03/15/23 1102 03/15/23 1107   BP: (!) 142/75 (!) 142/79   Pulse: 75 75   Temp: (!) 96 °F (35.6 °C)    TempSrc: Temporal    SpO2: 98%    Weight: 146 lb (66.2 kg)    Height: 5' 3\" (1.6 m)    PainSc:   4    PainLoc: Back        Health Maintenance Review: Patient reminded of \"due or due soon\" health maintenance. I have asked the patient to contact his/her primary care provider (PCP) for follow-up on his/her health maintenance. Coordination of Care Questionnaire:  :   1) Have you been to an emergency room, urgent care, or hospitalized since your last visit? If yes, where when, and reason for visit? no       2. Have seen or consulted any other health care provider since your last visit? If yes, where when, and reason for visit? NO      Patient is accompanied by self I have received verbal consent from Yossi García to discuss any/all medical information while they are present in the room.

## 2023-03-15 NOTE — PROGRESS NOTES
[facility], [address] MULTANI, Karrie Murtazaemiyl [place], [place]      COLLEEN Sosa, PCNP-BC   Lara Gama, [facility]-AG   Yolande Quan, FNEDITH-NAMRATA Carmen, FNP-C   Devi Gutierrez, AGPCNP-BC      [address]   at [address], [address] [place] [address], [facility] 22.   [phone]   FAX: [phone]  [facility]iver [place] of [facility]   at [address], [address] [place] [address], [address] [place] [address] Street [address]   [phone]   FAX: [phone]       Patient Care Team:  Isac Laguna MD as PCP - General (Family Medicine)  Isac Laguna MD as PCP - [facility] Provider  Yahaira Nelson MD (Surgery General)      Problem List  Date Reviewed: 3/16/2023            Codes Class Noted    Liver cyst ICD-10-CM: K76.89  ICD-9-CM: 573.8  3/16/2023        History of hip replacement ICD-10-CM: Z96.649  ICD-9-CM: V43.64  9/21/2021        History of total right knee replacement ICD-10-CM: V41.438  ICD-9-CM: V43.65  9/21/2021        Chronic hepatitis B ([facility] ICD-10-CM: B18.1  ICD-9-CM: 070.32  1/21/2021        Arthritis ICD-10-CM: M19.90  ICD-9-CM: 716.90  Unknown    Overview Signed 6/13/2019  2:18 PM by Isac Laguna MD     bilateral knees             Hypertension ICD-10-CM: S95  ICD-9-CM: 401.9  Unknown           Evelia Delgado [facility] for management of chronic HBV. The active problem list, all pertinent past medical history, medications, radiologic findings and laboratory findings related to the liver disorder were reviewed and discussed with the patient. The patient is a 60y.o. year old  female who has tested positive for HBV in 1980s. Risk factors for acquiring HBV are immigration from [place]. There was no history of acute icteric hepatitis     Imaging of the liver was ordered by Dr. Veronica Wright 9/2021. The results demonstrate a heterogeneous mildly hyperechoic liver with several large cysts measuring 6.7 x 5.1 x  4.6 cm, 5.4 x 5.2 x 5.3 cm which contains echogenic foci, and 3.3 x 2.6 x 2.4 cm. MRI 3/2022 demonstrated multiple cysts with no concerning characteristics. A CT of Abdomen was performed 9/2022 which again demonstrated cysts but no concerning mass or lesion     Assessment of liver fibrosis was performed with Fibroscan 3/2022. The result was 5.4 kPa which correlates with no fibrosis. The CAP score of 373 suggests hepatic steatosis. The patient has never received treatment for chronic HBV. The HBV DNA is undetectable but the HBsAg remains positive. Since the last office visit the patient the patient had a fall and sustained fracture in L3. Kyphoplasty was performed which has improved pain. A DEXA is scheduled for this month. The patient does not have any symptoms which could be attributed to the liver disorder. The patient is not experiencing the following symptoms which are commonly seen in this liver disorder: Fatigue, or pain in the right side over the liver,     The patient completes all daily activities without any functional limitations. ASSESSMENT AND PLAN:  Chronic HBV  This is E-antigen negative inactive HBV. Assessment of liver fibrosis was performed with Fibroscan 3/2022. The result was 5.4 kPa which correlates with no fibrosis. The CAP score of 373 suggests hepatic steatosis. Have performed laboratory testing to monitor liver function and degree of liver injury. This included BMP, hepatic panel, CBC with platelet count and INR. Laboratory testing from 3/14/2022 reviewed in detail. Follow-up testing ordered today. The liver transaminases, ALP, liver function and platelet count are normal.     The level of HBV DNA is <10. The HBsAg remains positive and the anti-HBV is negative. She has a low level of virus which is why she has not cleared surface antigen.  Will order all testing again today and continue to follow closely. The patient is not currently receiving treatment for HBV because she has inactive disease. The Fibroscan can be repeated annually or as often as clinically indicated to assess for fibrosis progression and/or regression. Liver Cyst  The patient has several large liver cysts measuring 6.7 x 5.1 x  4.6 cm, 5.4 x 5.2 x 5.3 cm which contains echogenic foci, and 3.3 x 2.6 x 2.4 cm. CEA, AFP and  are all negative as of 3/2022. MRI 3/2022 again demonstrated cysts with no concerning characteristics. This is benign. The patient is asymptomatic     Screening for Hepatocellular Carcinoma  HCC screening was performed with CT of Abdomen 9/2022 and did not suggest [address] AFP normal 3/2022. Will order AFP today and ultrasound. Treatment of other medical problems in patients with chronic liver disease  There are no contraindications for the patient to take most medications that are necessary for treatment of other medical issues. Normal doses of acetaminophen, as recommended on the label of the bottle, are not hepatotoxic except in the setting of daily alcohol use, even in patients with cirrhosis and can be utilized for pain. Counseling for alcohol in patients with chronic liver disease  The patient was counseled regarding alcohol consumption and the effect of alcohol on chronic liver disease. The patient does not consume any significant amount of alcohol. Vaccinations   Vaccination for viral hepatitis A is recommended since the patient has no serologic evidence of previous exposure or vaccination with immunity. The patient has received 2 doses of COVID-19 vaccine. Routine vaccinations against other bacterial and viral agents can be performed as indicated. Annual flu vaccination should be administered if indicated.     ALLERGIES  No Known Allergies    MEDICATIONS  Current Outpatient Medications   Medication Sig    valsartan (DIOVAN) 160 mg tablet Take 1 Tablet by mouth daily. No current facility-administered medications for this visit. SYSTEM REVIEW NOT RELATED TO LIVER DISEASE OR REVIEWED ABOVE:  Constitution systems: Negative for fever, chills, weight gain, weight loss. Eyes: Negative for visual changes. ENT: Negative for sore throat, painful swallowing. Respiratory: Negative for cough, hemoptysis, SOB. Cardiology: Negative for chest pain, palpitations. GI:  Negative for constipation or diarrhea. : Negative for urinary frequency, dysuria, hematuria, nocturia. Skin: Negative for rash. Hematology: Negative for easy bruising, blood clots. Musculo-skeletal: Negative for back pain, muscle pain, weakness. Neurologic: Negative for headaches, dizziness, vertigo, memory problems not related to HE. Psychology: Negative for anxiety, depression. FAMILY HISTORY:  There is no family history of liver disease. SOCIAL HISTORY:  The patient is . The patient has 2 children,   The children have been vaccinated for HBV. The patient has never used tobacco products. The patient has never consumed significant amounts of alcohol. The patient used to work in [employer]. The patient has not worked since 2013. PHYSICAL EXAMINATION:  Visit Vitals  BP (!) 142/79 (BP 1 Location: Left upper arm, BP Patient Position: Sitting, BP Cuff Size: Adult)   Pulse 75   Temp (!) 96 °F (35.6 °C) (Temporal)   Ht 5' 3\" (1.6 m)   Wt 146 lb (66.2 kg)   SpO2 98%   BMI 25.86 kg/m²       General: No acute distress. Eyes: Sclera anicteric. ENT: No oral lesions. Thyroid normal.  Nodes: No adenopathy. Skin: No spider angiomata. No jaundice. No palmar erythema. Respiratory: Lungs clear to auscultation. Cardiovascular: Regular heart rate. No murmurs. No JVD. Abdomen: Soft non-tender, liver size normal to percussion/palpation. Spleen not palpable. No obvious ascites. Extremities: No edema. No muscle wasting.   No gross arthritic changes. Neurologic: Alert and oriented. Cranial nerves grossly intact. No asterixis. LABORATORY STUDIES:  Liver [place] of [address] [place] [address] & Units 3/15/2023   WBC 3.4 - 10.8 x10E3/uL 5.2   ANC 1.4 - 7.0 x10E3/uL 3.0   HGB 11.1 - 15.9 g/dL 15.8    - 450 x10E3/uL 266   INR 0.9 - 1.2 0.9   AST 0 - 40 IU/L 16   ALT 0 - 32 IU/L 17   Alk Phos 44 - 121 IU/L 115   Bili, Total 0.0 - 1.2 mg/dL 0.5   Bili, Direct 0.00 - 0.40 mg/dL 0.12   Albumin 3.8 - 4.8 g/dL 4.7   BUN 8 - 27 mg/dL 15   Creat 0.57 - 1.00 mg/dL 0.71   Na 134 - 144 mmol/L 141   K 3.5 - 5.2 mmol/L 4.0   Cl 96 - 106 mmol/L 102   CO2 20 - 29 mmol/L 27   Glucose 70 - 99 mg/dL 86     Liver [place] of [address] [place] [address] Ref Rng & Units 3/14/2022 9/21/2021   WBC 3.4 - 10.8 x10E3/uL 6.1 4.3   ANC 1.4 - 7.0 x10E3/uL 3.7 2.2   HGB 11.1 - 15.9 g/dL 15.7 15.3    - 450 x10E3/uL 232 238   INR 0.9 - 1.2 0.9    AST 0 - 40 IU/L 19 21   ALT 0 - 32 IU/L 21 24   Alk Phos 44 - 121 IU/L 121 111   Bili, Total 0.0 - 1.2 mg/dL 0.4 0.7   Bili, Direct 0.00 - 0.40 mg/dL 0.12 0.2   Albumin 3.8 - 4.8 g/dL 4.5 4.1   BUN 8 - 27 mg/dL 15 14   Creat 0.57 - 1.00 mg/dL 0.50 (L) 0.64   Na 134 - 144 mmol/L 145 (H) 140   K 3.5 - 5.2 mmol/L 4.3 4.2   Cl 96 - 106 mmol/L 108 (H) 108   CO2 20 - 29 mmol/L 21 27   Glucose 70 - 99 mg/dL 93 82     Cancer Screening Latest Ref Rng & Units 3/14/2022 9/21/2021   AFP, Serum 0.0 - 9.2 ng/mL 1.8 1.8   AFP-L3% 0.0 - 9.9 % Comment Comment     Cancer Screening Latest Ref Rng & Units 3/14/2022   CA 19-9 0 - 35 U/mL 17   CEA 0.0 - 4.7 ng/mL 0.4     Laboratory testing from 3/14/2022 reviewed in detail. Additional testing included to evaluate progression or regression of disease. Laboratory testing results from today will be communicated by My Chart.      SEROLOGIES:  Serologies Latest Ref Rng & Units 9/21/2021   Hep A Ab, Total Negative   Negative   Hep B Surface Ag Index 441.14   Hep B Surface Ag Interp Negative   Positive (A)   Hep B Core Ab, Total Negative   Positive (A)   Hep B Surface Ab mIU/mL <3.10   Hep B Surface Ab Interp NONREACTIVE   NONREACTIVE   Hep C Ab 0.0 - 0.9 s/co ratio <0.1     9/2021. HBEantigen negative, anti-HBE positive. Virology HBV DNA   Latest Ref Rng & Units IU/mL   3/14/2022 <10   9/21/2021 <10     Serologies Latest Ref Rng & Units 3/15/2023 3/14/2022   Hep B Surface Ag Negative Positive (A) Positive (A)     Serologies Latest Ref Rng & Units 3/15/2023 3/14/2022   Hep B Surface AB QL  Non Reactive Non Reactive       LIVER HISTOLOGY:  3/2022. [facility]. EkPa was 5.4. IQR/med 28%. . The results suggested a fibrosis level of F0. The CAP score suggests there is hepatic steatosis. ENDOSCOPIC PROCEDURES:  Not available or performed    RADIOLOGY:  9/2021. Ultrasound of liver. Normal appearing liver. Normal PV flow. Several large cysts measuring: 6.7 x 5.1 cm, 5.4 x 5.2 cm containing echogenic material, 3.3 x 2.4 cm.    3/2022. MRI of liver. Multiple cysts in the liver with no change. No concerning mass or lesion. No ductal dilatation or stones. 9/2022. CT of Abdomen. Unchanged hepatic cysts. No splenomegaly. No concerning liver mass or lesion. OTHER TESTING:  Not available or performed    FOLLOW-UP:  All of the issues listed above in the Assessment and Plan were discussed with the patient. All questions were answered. The patient expressed a clear understanding of the above. [address] [address] in 6 months for ongoing monitoring and treatment. Will follow-up on imaging when available. April ARMANDO Valencia, [facility] of [address] [address] [facility], [address] [facility], [address]  [address] [facility]

## 2023-03-16 PROBLEM — K76.89 LIVER CYST: Status: ACTIVE | Noted: 2023-03-16

## 2023-03-16 LAB
AFP L3 MFR SERPL: NORMAL % (ref 0–9.9)
AFP SERPL-MCNC: 1.9 NG/ML (ref 0–9.2)
ALBUMIN SERPL-MCNC: 4.7 G/DL (ref 3.8–4.8)
ALP SERPL-CCNC: 115 IU/L (ref 44–121)
ALT SERPL-CCNC: 17 IU/L (ref 0–32)
AST SERPL-CCNC: 16 IU/L (ref 0–40)
BILIRUB DIRECT SERPL-MCNC: 0.12 MG/DL (ref 0–0.4)
BILIRUB SERPL-MCNC: 0.5 MG/DL (ref 0–1.2)
BUN SERPL-MCNC: 15 MG/DL (ref 8–27)
BUN/CREAT SERPL: 21 (ref 12–28)
CALCIUM SERPL-MCNC: 9.9 MG/DL (ref 8.7–10.3)
CHLORIDE SERPL-SCNC: 102 MMOL/L (ref 96–106)
CO2 SERPL-SCNC: 27 MMOL/L (ref 20–29)
CREAT SERPL-MCNC: 0.71 MG/DL (ref 0.57–1)
EGFRCR SERPLBLD CKD-EPI 2021: 95 ML/MIN/1.73
GLUCOSE SERPL-MCNC: 86 MG/DL (ref 70–99)
HBV DNA SERPL NAA+PROBE-ACNC: NORMAL IU/ML
HBV DNA SERPL NAA+PROBE-LOG IU: NORMAL LOG10 IU/ML
HBV SURFACE AB SER QL: NON REACTIVE
HBV SURFACE AG SERPL QL IA: POSITIVE
INR PPP: 0.9 (ref 0.9–1.2)
POTASSIUM SERPL-SCNC: 4 MMOL/L (ref 3.5–5.2)
PROT SERPL-MCNC: 7.5 G/DL (ref 6–8.5)
PROTHROMBIN TIME: 9.8 SEC (ref 9.1–12)
REF LAB TEST REF RANGE: NORMAL
SODIUM SERPL-SCNC: 141 MMOL/L (ref 134–144)

## 2023-03-17 NOTE — PROGRESS NOTES
Letter sent regarding the blood work results. The HBsAg is still positive. Will continue to monitor every 6 months.

## 2023-03-21 ENCOUNTER — HOSPITAL ENCOUNTER (OUTPATIENT)
Dept: ULTRASOUND IMAGING | Age: 63
Discharge: HOME | End: 2023-03-21
Attending: NURSE PRACTITIONER
Payer: COMMERCIAL

## 2023-03-21 DIAGNOSIS — B18.1 CHRONIC HEPATITIS B (HCC): ICD-10-CM

## 2023-03-21 PROCEDURE — 76700 US EXAM ABDOM COMPLETE: CPT

## 2023-03-28 ENCOUNTER — HOSPITAL ENCOUNTER (OUTPATIENT)
Dept: MAMMOGRAPHY | Age: 63
Discharge: HOME | End: 2023-03-28
Attending: FAMILY MEDICINE
Payer: MEDICAID

## 2023-03-28 DIAGNOSIS — M80.00XA OSTEOPOROSIS WITH CURRENT PATHOLOGICAL FRACTURE, UNSPECIFIED OSTEOPOROSIS TYPE, INITIAL ENCOUNTER: ICD-10-CM

## 2023-03-28 PROCEDURE — 77080 DXA BONE DENSITY AXIAL: CPT

## 2023-04-13 PROBLEM — M81.0 AGE-RELATED OSTEOPOROSIS WITHOUT CURRENT PATHOLOGICAL FRACTURE: Status: ACTIVE | Noted: 2023-04-13

## 2023-04-13 PROBLEM — E55.9 VITAMIN D DEFICIENCY: Status: ACTIVE | Noted: 2023-04-13

## 2023-05-05 DIAGNOSIS — E55.9 VITAMIN D DEFICIENCY: Primary | ICD-10-CM

## 2023-05-05 RX ORDER — ERGOCALCIFEROL 1.25 MG/1
50000 CAPSULE ORAL
Qty: 24 CAPSULE | Refills: 0 | Status: SHIPPED | OUTPATIENT
Start: 2023-05-08 | End: 2023-07-28

## 2023-05-09 DIAGNOSIS — E55.9 VITAMIN D DEFICIENCY: Primary | ICD-10-CM

## 2023-05-19 RX ORDER — ALENDRONATE SODIUM 70 MG/1
70 TABLET ORAL
COMMUNITY
Start: 2023-04-13

## 2023-05-19 RX ORDER — VALSARTAN 160 MG/1
160 TABLET ORAL DAILY
COMMUNITY
Start: 2022-12-07 | End: 2023-07-20 | Stop reason: SDUPTHER

## 2023-05-19 RX ORDER — ERGOCALCIFEROL 1.25 MG/1
50000 CAPSULE ORAL
COMMUNITY
Start: 2023-05-08 | End: 2023-07-28

## 2023-05-19 RX ORDER — CALCIUM CARBONATE/VITAMIN D3 600 MG-10
1 TABLET ORAL 2 TIMES DAILY
COMMUNITY

## 2023-07-20 ENCOUNTER — TELEMEDICINE (OUTPATIENT)
Facility: CLINIC | Age: 63
End: 2023-07-20
Payer: MEDICAID

## 2023-07-20 DIAGNOSIS — I10 PRIMARY HYPERTENSION: Primary | ICD-10-CM

## 2023-07-20 DIAGNOSIS — R06.81 APNEA: ICD-10-CM

## 2023-07-20 PROCEDURE — 99214 OFFICE O/P EST MOD 30 MIN: CPT | Performed by: FAMILY MEDICINE

## 2023-07-20 RX ORDER — VALSARTAN 160 MG/1
160 TABLET ORAL DAILY
Qty: 90 TABLET | Refills: 4 | Status: SHIPPED | OUTPATIENT
Start: 2023-07-20

## 2023-07-20 SDOH — ECONOMIC STABILITY: FOOD INSECURITY: WITHIN THE PAST 12 MONTHS, THE FOOD YOU BOUGHT JUST DIDN'T LAST AND YOU DIDN'T HAVE MONEY TO GET MORE.: NEVER TRUE

## 2023-07-20 SDOH — ECONOMIC STABILITY: FOOD INSECURITY: WITHIN THE PAST 12 MONTHS, YOU WORRIED THAT YOUR FOOD WOULD RUN OUT BEFORE YOU GOT MONEY TO BUY MORE.: NEVER TRUE

## 2023-07-20 SDOH — ECONOMIC STABILITY: INCOME INSECURITY: HOW HARD IS IT FOR YOU TO PAY FOR THE VERY BASICS LIKE FOOD, HOUSING, MEDICAL CARE, AND HEATING?: NOT HARD AT ALL

## 2023-07-20 SDOH — ECONOMIC STABILITY: HOUSING INSECURITY
IN THE LAST 12 MONTHS, WAS THERE A TIME WHEN YOU DID NOT HAVE A STEADY PLACE TO SLEEP OR SLEPT IN A SHELTER (INCLUDING NOW)?: NO

## 2023-07-20 ASSESSMENT — PATIENT HEALTH QUESTIONNAIRE - PHQ9
SUM OF ALL RESPONSES TO PHQ QUESTIONS 1-9: 0
SUM OF ALL RESPONSES TO PHQ QUESTIONS 1-9: 0
2. FEELING DOWN, DEPRESSED OR HOPELESS: 0
1. LITTLE INTEREST OR PLEASURE IN DOING THINGS: 0
SUM OF ALL RESPONSES TO PHQ QUESTIONS 1-9: 0
SUM OF ALL RESPONSES TO PHQ QUESTIONS 1-9: 0
SUM OF ALL RESPONSES TO PHQ9 QUESTIONS 1 & 2: 0

## 2023-07-20 ASSESSMENT — ENCOUNTER SYMPTOMS: SHORTNESS OF BREATH: 0

## 2023-07-20 NOTE — PROGRESS NOTES
Lucretia Gallego (:  1960) is a Established patient, presenting virtually for evaluation of the following:    Assessment & Plan   Below is the assessment and plan developed based on review of pertinent history, physical exam, labs, studies, and medications. 1. Primary hypertension  -     valsartan (DIOVAN) 160 MG tablet; Take 1 tablet by mouth daily, Disp-90 tablet, R-4Normal  2. Apnea  -     [address]    Blood pressure controlled  Possible obstructive sleep apnea  Continue current plans. Refills per orders  Sleep referral    Return in about 6 months (around 2024) for HTN. Subjective   Patient presents with:  Hypertension: Follow up. Her daughter states she stops breathing at night. She snores and has daytime sleepiness. Review of Systems   Eyes:  Negative for visual disturbance. Respiratory:  Negative for shortness of breath. Cardiovascular:  Negative for chest pain. Neurological:  Negative for dizziness, speech difficulty, weakness, numbness and headaches. Objective   Patient-Reported Vitals  Patient-Reported Systolic (Top): 189 mmHg  Patient-Reported Diastolic (Bottom): 78 mmHg  Patient-Reported Pulse: 90  Patient-Reported Weight: 146lb       Physical Exam  Constitutional:       General: She is not in acute distress. Appearance: Normal appearance. Neurological:      Mental Status: She is alert and oriented to person, place, and time. Lucretia Gallego, was evaluated through a synchronous (real-time) audio-video encounter. The patient (or guardian if applicable) is aware that this is a billable service, which includes applicable co-pays. This Virtual Visit was conducted with patient's (and/or legal guardian's) consent. Patient identification was verified, and a caregiver was present when appropriate.    The patient was located at Home: [facility]  Provider was located at Home ([address] [facility]): [facility]

## 2023-07-20 NOTE — PROGRESS NOTES
Chief Complaint   Patient presents with    Hypertension     Follow up. 1. Have you been to the ER, urgent care clinic since your last visit? Hospitalized since your last visit? No    2. Have you seen or consulted any other health care providers outside of the [address] [place] [address] since your last visit? Include any pap smears or colon screening.  No

## 2023-08-28 ASSESSMENT — SLEEP AND FATIGUE QUESTIONNAIRES
HOW LIKELY ARE YOU TO NOD OFF OR FALL ASLEEP WHILE SITTING INACTIVE IN A PUBLIC PLACE: 2
NECK CIRCUMFERENCE (INCHES): 15
HOW LIKELY ARE YOU TO NOD OFF OR FALL ASLEEP IN A CAR, WHILE STOPPED FOR A FEW MINUTES IN TRAFFIC: 0
SELECT ANY OF THE FOLLOWING BEHAVIORS OBSERVED WHILE PATIENT ASLEEP: LOUD SNORING
HOW LIKELY ARE YOU TO NOD OFF OR FALL ASLEEP WHILE LYING DOWN TO REST IN THE AFTERNOON WHEN CIRCUMSTANCES PERMIT: 3
HOW LIKELY ARE YOU TO NOD OFF OR FALL ASLEEP WHILE WATCHING TV: 3
HOW LIKELY ARE YOU TO NOD OFF OR FALL ASLEEP WHILE SITTING QUIETLY AFTER LUNCH WITHOUT ALCOHOL: 3
ESS TOTAL SCORE: 16
HOW LIKELY ARE YOU TO NOD OFF OR FALL ASLEEP WHILE SITTING AND TALKING TO SOMEONE: 0
AVERAGE NUMBER OF SLEEP HOURS: 6
HOW LIKELY ARE YOU TO NOD OFF OR FALL ASLEEP WHEN YOU ARE A PASSENGER IN A CAR FOR AN HOUR WITHOUT A BREAK: 2
ARE YOU BOTHERED BY WAKING UP TOO EARLY AND NOT BEING ABLE TO GET BACK TO SLEEP: IS
DO YOU GET TOO LITTLE SLEEP AT NIGHT: DOES
HOW LIKELY ARE YOU TO NOD OFF OR FALL ASLEEP WHILE SITTING AND READING: 3

## 2023-08-31 ENCOUNTER — OFFICE VISIT (OUTPATIENT)
Age: 63
End: 2023-08-31
Payer: MEDICARE

## 2023-08-31 VITALS
HEIGHT: 63 IN | SYSTOLIC BLOOD PRESSURE: 128 MMHG | OXYGEN SATURATION: 97 % | WEIGHT: 147.6 LBS | DIASTOLIC BLOOD PRESSURE: 82 MMHG | HEART RATE: 76 BPM | BODY MASS INDEX: 26.15 KG/M2

## 2023-08-31 DIAGNOSIS — G47.33 OSA (OBSTRUCTIVE SLEEP APNEA): Primary | ICD-10-CM

## 2023-08-31 PROCEDURE — 3079F DIAST BP 80-89 MM HG: CPT | Performed by: SPECIALIST

## 2023-08-31 PROCEDURE — 3074F SYST BP LT 130 MM HG: CPT | Performed by: SPECIALIST

## 2023-08-31 PROCEDURE — 99204 OFFICE O/P NEW MOD 45 MIN: CPT | Performed by: SPECIALIST

## 2023-08-31 ASSESSMENT — SLEEP AND FATIGUE QUESTIONNAIRES
AVERAGE NUMBER OF SLEEP HOURS: 6
HOW LONG DO YOU NAP: 45 MINUTES TO 1 HOUR
HOW LIKELY ARE YOU TO NOD OFF OR FALL ASLEEP IN A CAR, WHILE STOPPED FOR A FEW MINUTES IN TRAFFIC: WOULD NEVER DOZE
SELECT ANY OF THE FOLLOWING BEHAVIORS OBSERVED WHILE YOU ARE ASLEEP: LOUD SNORING
DO YOU HAVE DIFFICULTY CONCENTRATING ON THE THINGS YOU DO BECAUSE YOU ARE SLEEPY OR TIRED: YES, A LITTLE
DO YOU HAVE DIFFICULTY VISITING YOUR FAMILY OR FRIENDS IN THEIR HOME BECAUSE YOU BECOME SLEEPY OR TIRED: NO
DO YOU GENERALLY HAVE DIFFICULTY REMEMBERING THINGS BECAUSE YOU ARE SLEEPY OR TIRED: YES, MODERATE
DO YOU HAVE DIFFICULTY BEING AS ACTIVE AS YOU WANT TO BE IN THE MORNING BECAUSE YOU ARE SLEEPY OR TIRED: YES, LITTLE
FOSQ SCORE: 12.5
HOW LIKELY ARE YOU TO NOD OFF OR FALL ASLEEP WHILE WATCHING TV: HIGH CHANCE OF DOZING
DO YOU WORK SHIFTS: NO
ARE YOU BOTHERED BY WAKING UP TOO EARLY AND NOT BEING ABLE TO GET BACK TO SLEEP: YES
DO YOU HAVE DIFFICULTY BEING AS ACTIVE AS YOU WANT TO BE IN THE EVENING BECAUSE YOU ARE SLEEPY OR TIRED: YES, EXTREME
HOW LIKELY ARE YOU TO NOD OFF OR FALL ASLEEP WHILE SITTING QUIETLY AFTER LUNCH WITHOUT ALCOHOL: HIGH CHANCE OF DOZING
HOW LIKELY ARE YOU TO NOD OFF OR FALL ASLEEP WHILE SITTING INACTIVE IN A PUBLIC PLACE: MODERATE CHANCE OF DOZING
HOW LIKELY ARE YOU TO NOD OFF OR FALL ASLEEP WHILE SITTING AND TALKING TO SOMEONE: WOULD NEVER DOZE
HAS YOUR MOOD BEEN AFFECTED BECAUSE YOU ARE SLEEPY OR TIRED: YES, LITTLE
DO YOU HAVE DIFFICULTY WATCHING A MOVIE OR VIDEO BECAUSE YOU BECOME SLEEPY OR TIRED: YES, MODERATE
HOW LIKELY ARE YOU TO NOD OFF OR FALL ASLEEP WHEN YOU ARE A PASSENGER IN A CAR FOR AN HOUR WITHOUT A BREAK: MODERATE CHANCE OF DOZING
NUMBER OF TIMES YOU WAKE PER NIGHT: 3
HOW LIKELY ARE YOU TO NOD OFF OR FALL ASLEEP WHILE LYING DOWN TO REST IN THE AFTERNOON WHEN CIRCUMSTANCES PERMIT: HIGH CHANCE OF DOZING
DO YOU GET TOO LITTLE SLEEP AT NIGHT: YES
HOW LIKELY ARE YOU TO NOD OFF OR FALL ASLEEP WHILE SITTING AND READING: HIGH CHANCE OF DOZING
HOW MANY NAPS DO YOU TAKE PER WEEK: 4
DO YOU HAVE DIFFICULTY OPERATING A MOTOR VEHICLE FOR SHORT DISTANCES (LESS THAN 100 MILES) BECAUSE YOU BECOME SLEEPY: YES, MODERATE
HAS YOUR RELATIONSHIP WITH FAMILY, FRIENDS OR WORK COLLEAGUES BEEN AFFECTED BECAUSE YOU ARE SLEEPY OR TIRED: YES, A LITTLE
DO YOU HAVE DIFFICULTY OPERATING A MOTOR VEHICLE FOR LONG DISTANCES (GREATER THAN 100 MILES) BECAUSE YOU BECOME SLEEPY: YES, MODERATE
DO YOU HAVE PROBLEMS WITH FREQUENT AWAKENINGS AT NIGHT: YES
DO YOU TAKE NAPS: YES

## 2023-08-31 NOTE — PROGRESS NOTES
[facility] - [address]  [facility]  [address] [address]  Dept: [phone] [address] [place], [address]  Tel.  [phone]  Fax. [address] [facility], [address] [facility]  Tel.  [phone]  Fax. [phone] [facility], [address] [facility]  Tel.  [phone]  Fax. [phone]       Chief Complaint       Chief Complaint   Patient presents with    Sleep Problem     NP refd by Sharlene Ozuna MD  for LETY eval; no prior SS       HPI      Candida Perez is 60 y.o. female seen for evaluation of a sleep disorder. Normally retires at 11 PM and will get a bed at 6 AM.  May awaken several times during the night. At times has difficulty easily returning to sleep. The patient reports she has experienced snoring described as loud, heard in separate rooms and through close doors. Told of apparent apnea. May doze if seated and an active such when reading, watching TV, in public place, as a passenger, seated quietly after lunch. Bradford Sleepiness Scale: 16    The patient has not undergone diagnostic testing for the current problems.          Sleep Medicine 8/28/2023 8/28/2023   Sitting and reading 3 3   Watching TV 3 3   Sitting, inactive in a public place (e.g. a theatre or a meeting) 2 2   As a passenger in a car for an hour without a break 2 2   Lying down to rest in the afternoon when circumstances permit 3 3   Sitting and talking to someone 0 0   Sitting quietly after a lunch without alcohol 3 3   In a car, while stopped for a few minutes in traffic 0 0   Bradford Sleepiness Score 16 16   Neck circumference (Inches) - 15        No Known Allergies      Current Outpatient Medications:     valsartan (DIOVAN) 160 MG tablet, Take 1 tablet by mouth daily, Disp: 90 tablet, Rfl: 4    alendronate (FOSAMAX) 70 MG tablet, Take 1 tablet by mouth every 7 days, Disp: , Rfl:

## 2023-09-18 ENCOUNTER — PROCEDURE VISIT (OUTPATIENT)
Age: 63
End: 2023-09-18

## 2023-09-18 DIAGNOSIS — G47.33 OSA (OBSTRUCTIVE SLEEP APNEA): Primary | ICD-10-CM

## 2023-09-18 NOTE — PROGRESS NOTES
[address] [facility], [address]  Tel.  [phone]  Fax. [address] [facility], [address] [place] [address]  Tel.  [phone]  Fax. [phone] [facility], [address] [facility]  Tel.  [phone]  Fax. [phone]       S>Bandar Avalos is a 58 y.o. female seen today to receive a home sleep testing unit (HST). Patient was educated on proper hookup and operation of the HST. Instruction forms and documentation were reviewed and signed. The patient demonstrated good understanding of the HST.    O>    There were no vitals taken for this visit. A>  No diagnosis found. P>  General information regarding operations and maintenance of the device was provided. She was provided information on sleep apnea including coresponding risk factors and the importance of proper treatment. Follow-up appointment was made to return the HST. She will be contacted once the results have been reviewed. She was asked to contact our office for any problems regarding her home sleep test study.    [address] [facility] [place] [id number]

## 2023-09-19 ENCOUNTER — HOSPITAL ENCOUNTER (OUTPATIENT)
Facility: HOSPITAL | Age: 63
Discharge: HOME | End: 2023-09-22
Payer: MEDICARE

## 2023-09-19 ENCOUNTER — OFFICE VISIT (OUTPATIENT)
Age: 63
End: 2023-09-19
Payer: MEDICARE

## 2023-09-19 VITALS
WEIGHT: 149 LBS | HEART RATE: 80 BPM | TEMPERATURE: 97.1 F | OXYGEN SATURATION: 99 % | BODY MASS INDEX: 26.4 KG/M2 | SYSTOLIC BLOOD PRESSURE: 130 MMHG | HEIGHT: 63 IN | DIASTOLIC BLOOD PRESSURE: 83 MMHG

## 2023-09-19 DIAGNOSIS — Z11.59 ENCOUNTER FOR SCREENING FOR OTHER VIRAL DISEASES: ICD-10-CM

## 2023-09-19 DIAGNOSIS — B18.1 CHRONIC HEPATITIS B (HCC): Primary | ICD-10-CM

## 2023-09-19 DIAGNOSIS — K76.89 LIVER CYST: ICD-10-CM

## 2023-09-19 PROCEDURE — G0400 HOME SLEEP TEST/TYPE 4 PORTA: HCPCS | Performed by: SPECIALIST

## 2023-09-19 PROCEDURE — 3074F SYST BP LT 130 MM HG: CPT | Performed by: NURSE PRACTITIONER

## 2023-09-19 PROCEDURE — 99214 OFFICE O/P EST MOD 30 MIN: CPT | Performed by: NURSE PRACTITIONER

## 2023-09-19 PROCEDURE — 3078F DIAST BP <80 MM HG: CPT | Performed by: NURSE PRACTITIONER

## 2023-09-19 NOTE — PROGRESS NOTES
MD Verito, FACP, [address], [place]      Karolyn Ramirez, PRASHANTH Matamoros, [facility]-BC   Kelin Wogn, [facility]   Yenny Cr, LUIZ Burris, FNP-C   Ole Faustin, PCNP-BC      [address]   at [facility]   [address] [facility], [address] [facility], [address]   [phone]   FAX: [phone]   at [facility], [address] [facility], [address]   [phone]   FAX: [phone]     Patient Care Team:  Hiral Grijalva MD as PCP - Polo Seay MD as PCP - Empaneled Provider    Patient Active Problem List   Diagnosis    Chronic hepatitis B ([address] [facility])    Arthritis    Hypertension    History of hip replacement    History of total right knee replacement    Liver cyst    Age-related osteoporosis without current pathological fracture    Vitamin D deficiency     Yuni Christopher [facility] for management of chronic HBV. The active problem list, all pertinent past medical history, medications, radiologic findings and laboratory findings related to the liver disorder were reviewed and discussed with the patient. The patient is a 58 y.o.  female who has tested positive for HBV in 1980s. Risk factors for acquiring HBV are immigration from [place]. There was no history of acute icteric hepatitis     Imaging of the liver was ordered by Dr. Dave Martell 9/2021. The results demonstrate a heterogeneous mildly hyperechoic liver with several large cysts measuring 6.7 x 5.1 x  4.6 cm, 5.4 x 5.2 x 5.3 cm which contains echogenic foci, and 3.3 x 2.6 x 2.4 cm. MRI 3/2022 demonstrated multiple cysts with no concerning characteristics.  A CT of Abdomen was performed 9/2022 which again demonstrated cysts but no concerning mass or lesion     Assessment of liver fibrosis was

## 2023-09-24 ENCOUNTER — TELEPHONE (OUTPATIENT)
Age: 63
End: 2023-09-24

## 2023-09-24 DIAGNOSIS — G47.19 EXCESSIVE DAYTIME SLEEPINESS: Primary | ICD-10-CM

## 2023-09-24 LAB
AFP L3 MFR SERPL: NORMAL % (ref 0–9.9)
AFP SERPL-MCNC: 2.5 NG/ML (ref 0–9.2)
ALBUMIN SERPL-MCNC: 5 G/DL (ref 3.9–4.9)
ALP SERPL-CCNC: 88 IU/L (ref 44–121)
ALT SERPL-CCNC: 15 IU/L (ref 0–32)
AST SERPL-CCNC: 13 IU/L (ref 0–40)
BASOPHILS # BLD AUTO: 0 X10E3/UL (ref 0–0.2)
BASOPHILS NFR BLD AUTO: 1 %
BILIRUB DIRECT SERPL-MCNC: 0.14 MG/DL (ref 0–0.4)
BILIRUB SERPL-MCNC: 0.5 MG/DL (ref 0–1.2)
BUN SERPL-MCNC: 12 MG/DL (ref 8–27)
BUN/CREAT SERPL: 17 (ref 12–28)
CALCIUM SERPL-MCNC: 9.6 MG/DL (ref 8.7–10.3)
CHLORIDE SERPL-SCNC: 102 MMOL/L (ref 96–106)
CO2 SERPL-SCNC: 23 MMOL/L (ref 20–29)
CREAT SERPL-MCNC: 0.71 MG/DL (ref 0.57–1)
EGFRCR SERPLBLD CKD-EPI 2021: 95 ML/MIN/1.73
EOSINOPHIL # BLD AUTO: 0.1 X10E3/UL (ref 0–0.4)
EOSINOPHIL NFR BLD AUTO: 1 %
ERYTHROCYTE [DISTWIDTH] IN BLOOD BY AUTOMATED COUNT: 12.3 % (ref 11.7–15.4)
GLUCOSE SERPL-MCNC: 101 MG/DL (ref 70–99)
HBV CORE AB SERPL QL IA: POSITIVE
HBV CORE IGM SERPL QL IA: NEGATIVE
HBV DNA SERPL NAA+PROBE-ACNC: NORMAL IU/ML
HBV DNA SERPL NAA+PROBE-ACNC: NORMAL IU/ML
HBV DNA SERPL NAA+PROBE-LOG IU: NORMAL LOG10 IU/ML
HBV DNA SERPL NAA+PROBE-LOG IU: NORMAL LOG10 IU/ML
HBV GENOTYPE + DRUG RESISTANCE: NORMAL
HBV SURFACE AB SER-ACNC: <3.1 MIU/ML
HBV SURFACE AG SERPL QL IA: NORMAL
HBV SURFACE AG SERPL QL NT: POSITIVE
HCT VFR BLD AUTO: 48 % (ref 34–46.6)
HGB BLD-MCNC: 16.4 G/DL (ref 11.1–15.9)
IMM GRANULOCYTES # BLD AUTO: 0 X10E3/UL (ref 0–0.1)
IMM GRANULOCYTES NFR BLD AUTO: 0 %
INR PPP: 0.9 (ref 0.9–1.2)
LYMPHOCYTES # BLD AUTO: 1.5 X10E3/UL (ref 0.7–3.1)
LYMPHOCYTES NFR BLD AUTO: 36 %
MCH RBC QN AUTO: 30.5 PG (ref 26.6–33)
MCHC RBC AUTO-ENTMCNC: 34.2 G/DL (ref 31.5–35.7)
MCV RBC AUTO: 89 FL (ref 79–97)
MONOCYTES # BLD AUTO: 0.3 X10E3/UL (ref 0.1–0.9)
MONOCYTES NFR BLD AUTO: 8 %
NEUTROPHILS # BLD AUTO: 2.2 X10E3/UL (ref 1.4–7)
NEUTROPHILS NFR BLD AUTO: 54 %
PLATELET # BLD AUTO: 235 X10E3/UL (ref 150–450)
POTASSIUM SERPL-SCNC: 4 MMOL/L (ref 3.5–5.2)
PROTHROMBIN TIME: 10.3 SEC (ref 9.1–12)
RBC # BLD AUTO: 5.37 X10E6/UL (ref 3.77–5.28)
REF LAB TEST REF RANGE: NORMAL
SODIUM SERPL-SCNC: 140 MMOL/L (ref 134–144)
WBC # BLD AUTO: 4.2 X10E3/UL (ref 3.4–10.8)

## 2023-10-03 ENCOUNTER — TRANSCRIBE ORDERS (OUTPATIENT)
Facility: HOSPITAL | Age: 63
End: 2023-10-03

## 2023-10-03 DIAGNOSIS — Z12.31 ENCOUNTER FOR SCREENING MAMMOGRAM FOR BREAST CANCER: Primary | ICD-10-CM

## 2023-10-20 ENCOUNTER — HOSPITAL ENCOUNTER (OUTPATIENT)
Facility: HOSPITAL | Age: 63
End: 2023-10-20
Attending: FAMILY MEDICINE
Payer: COMMERCIAL

## 2023-10-20 VITALS — BODY MASS INDEX: 26.4 KG/M2 | HEIGHT: 63 IN | WEIGHT: 149 LBS

## 2023-10-20 DIAGNOSIS — Z12.31 ENCOUNTER FOR SCREENING MAMMOGRAM FOR BREAST CANCER: ICD-10-CM

## 2023-10-20 PROCEDURE — 77067 SCR MAMMO BI INCL CAD: CPT

## 2023-10-20 PROCEDURE — 77063 BREAST TOMOSYNTHESIS BI: CPT

## 2023-11-21 ENCOUNTER — HOSPITAL ENCOUNTER (OUTPATIENT)
Facility: HOSPITAL | Age: 63
Discharge: HOME | End: 2023-11-24
Payer: COMMERCIAL

## 2023-11-21 DIAGNOSIS — B18.1 CHRONIC HEPATITIS B (HCC): ICD-10-CM

## 2023-11-21 PROCEDURE — 76700 US EXAM ABDOM COMPLETE: CPT

## 2023-11-27 ENCOUNTER — TELEPHONE (OUTPATIENT)
Facility: CLINIC | Age: 63
End: 2023-11-27

## 2023-11-27 NOTE — TELEPHONE ENCOUNTER
Called pt to get scheduled for knee pain, via my chart. No answer, left a voicemail.    Ander Hamilton

## 2024-02-13 ENCOUNTER — TELEPHONE (OUTPATIENT)
Facility: CLINIC | Age: 64
End: 2024-02-13

## 2024-03-15 SDOH — HEALTH STABILITY: PHYSICAL HEALTH: ON AVERAGE, HOW MANY DAYS PER WEEK DO YOU ENGAGE IN MODERATE TO STRENUOUS EXERCISE (LIKE A BRISK WALK)?: 0 DAYS

## 2024-03-15 SDOH — HEALTH STABILITY: PHYSICAL HEALTH: ON AVERAGE, HOW MANY MINUTES DO YOU ENGAGE IN EXERCISE AT THIS LEVEL?: 0 MIN

## 2024-03-18 ENCOUNTER — NURSE ONLY (OUTPATIENT)
Age: 64
End: 2024-03-18
Payer: COMMERCIAL

## 2024-03-18 ENCOUNTER — OFFICE VISIT (OUTPATIENT)
Age: 64
End: 2024-03-18
Payer: COMMERCIAL

## 2024-03-18 VITALS
BODY MASS INDEX: 23.82 KG/M2 | RESPIRATION RATE: 16 BRPM | HEIGHT: 65 IN | TEMPERATURE: 97.7 F | HEART RATE: 87 BPM | OXYGEN SATURATION: 98 % | DIASTOLIC BLOOD PRESSURE: 70 MMHG | SYSTOLIC BLOOD PRESSURE: 126 MMHG | WEIGHT: 143 LBS

## 2024-03-18 DIAGNOSIS — I10 PRIMARY HYPERTENSION: ICD-10-CM

## 2024-03-18 DIAGNOSIS — Z11.4 SCREENING FOR HIV (HUMAN IMMUNODEFICIENCY VIRUS): ICD-10-CM

## 2024-03-18 DIAGNOSIS — Z53.20 PAP SMEAR OF CERVIX DECLINED: ICD-10-CM

## 2024-03-18 DIAGNOSIS — Z23 ENCOUNTER FOR IMMUNIZATION: ICD-10-CM

## 2024-03-18 DIAGNOSIS — M89.9 DISORDER OF BONE: Primary | ICD-10-CM

## 2024-03-18 DIAGNOSIS — M81.0 OSTEOPOROSIS, UNSPECIFIED OSTEOPOROSIS TYPE, UNSPECIFIED PATHOLOGICAL FRACTURE PRESENCE: ICD-10-CM

## 2024-03-18 DIAGNOSIS — I10 ESSENTIAL (PRIMARY) HYPERTENSION: ICD-10-CM

## 2024-03-18 DIAGNOSIS — I10 ESSENTIAL (PRIMARY) HYPERTENSION: Primary | ICD-10-CM

## 2024-03-18 PROCEDURE — 99203 OFFICE O/P NEW LOW 30 MIN: CPT | Performed by: FAMILY MEDICINE

## 2024-03-18 PROCEDURE — 90677 PCV20 VACCINE IM: CPT | Performed by: FAMILY MEDICINE

## 2024-03-18 PROCEDURE — PBSHW PNEUMOCOCCAL, PCV20, PREVNAR 20, (AGE 6W+), IM, PF: Performed by: FAMILY MEDICINE

## 2024-03-18 PROCEDURE — 3074F SYST BP LT 130 MM HG: CPT | Performed by: FAMILY MEDICINE

## 2024-03-18 PROCEDURE — 3078F DIAST BP <80 MM HG: CPT | Performed by: FAMILY MEDICINE

## 2024-03-18 RX ORDER — VALSARTAN 160 MG/1
160 TABLET ORAL DAILY
Qty: 90 TABLET | Refills: 1 | Status: SHIPPED | OUTPATIENT
Start: 2024-03-18

## 2024-03-18 RX ORDER — ALENDRONATE SODIUM 70 MG/1
70 TABLET ORAL
Qty: 12 TABLET | Refills: 1 | Status: SHIPPED | OUTPATIENT
Start: 2024-03-18

## 2024-03-18 ASSESSMENT — PATIENT HEALTH QUESTIONNAIRE - PHQ9
SUM OF ALL RESPONSES TO PHQ QUESTIONS 1-9: 0
SUM OF ALL RESPONSES TO PHQ9 QUESTIONS 1 & 2: 0
2. FEELING DOWN, DEPRESSED OR HOPELESS: NOT AT ALL
1. LITTLE INTEREST OR PLEASURE IN DOING THINGS: NOT AT ALL
SUM OF ALL RESPONSES TO PHQ QUESTIONS 1-9: 0

## 2024-03-18 NOTE — PROGRESS NOTES
Identified pt with two pt identifiers(name and ).    Chief Complaint   Patient presents with    New Patient    Establish Care    Hip Pain     That goes down her leg when she sleeps at night.     Gynecologic Exam        Health Maintenance Due   Topic    Pneumococcal 0-64 years Vaccine (1 of 2 - PCV)    HIV screen     Hepatitis A vaccine (1 of 2 - Risk 2-dose series)    DTaP/Tdap/Td vaccine (1 - Tdap)    Cervical cancer screen     Hepatitis B vaccine (1 of 3 - Risk 3-dose series)    Respiratory Syncytial Virus (RSV) Pregnant or age 60 yrs+ (1 - 1-dose 60+ series)    Diabetes screen     Flu vaccine (1)    COVID-19 Vaccine ( season)       Wt Readings from Last 3 Encounters:   24 64.9 kg (143 lb)   10/20/23 67.6 kg (149 lb)   23 67.6 kg (149 lb)     Temp Readings from Last 3 Encounters:   24 97.7 °F (36.5 °C) (Temporal)   23 97.1 °F (36.2 °C) (Temporal)     BP Readings from Last 3 Encounters:   24 126/70   23 130/83   23 128/82     Pulse Readings from Last 3 Encounters:   24 87   23 80   23 76           Depression Screening:  :         3/18/2024     8:54 AM 2023     9:35 AM 3/15/2023    10:59 AM 2022    10:42 AM 10/19/2022     8:26 AM 2022    10:09 AM 2019     1:37 PM   PHQ-9 Questionaire   Little interest or pleasure in doing things 0 0 0 0 0 0 0   Feeling down, depressed, or hopeless 0 0 0 0 0 0 0   PHQ-9 Total Score 0 0 0 0 0 0 0        Fall Risk Assessment:  :          No data to display                 Abuse Screening:  :          No data to display                 Coordination of Care Questionnaire:  :     \"Have you been to the ER, urgent care clinic since your last visit?  Hospitalized since your last visit?\"    NO    “Have you seen or consulted any other health care providers outside of [facility] since your last visit?”    NO        “Have you had a pap smear?”    NO

## 2024-03-18 NOTE — PROGRESS NOTES
[facility]  [address] [place] [address]  Phone: [phone]  Fax: [phone]        Chief Complaint   Patient presents with    New Patient    Establish Care    Hip Pain     That goes down her leg when she sleeps at night.     Gynecologic Exam     She is a 63 y.o. female who presents for establish care.    Presents for HTN follow up.  Taking medications as prescribed and reports no side effects.  Denies chest pain, headache, visual disturbances.  Does not check BP at home.    Has chronic back/leg/hip pain from lumbar radiculopathy.  She follows closely with orthopedics for this.  She is overdue for another injection and plans to get in with them soon for this.    She has a history of osteoporosis that was seen on DEXA ~1 year ago.  Chart reflects that she was started on alendronate, but this was discontinued.  She is not on this currently.  She is not sure of the circumstances surrounding this.  Does not think that she had issues with the medication.    She declines pap today. Does not wish to have any more pap testing.    Prior to Visit Medications    Medication Sig Taking? Authorizing Provider   valsartan (DIOVAN) 160 MG tablet Take 1 tablet by mouth daily Yes Kiran Tapia MD   alendronate (FOSAMAX) 70 MG tablet Take 1 tablet by mouth every 7 days Yes Kiran Tapia MD       No Known Allergies      Reviewed PmHx, RxHx, FmHx, SocHx, AllgHx and updated and dated in the chart.      Objective:     Vitals:    03/18/24 0855   BP: 126/70   Site: Right Upper Arm   Position: Sitting   Cuff Size: Medium Adult   Pulse: 87   Resp: 16   Temp: 97.7 °F (36.5 °C)   TempSrc: Temporal   SpO2: 98%   Weight: 64.9 kg (143 lb)   Height: 1.651 m (5' 5\")     Physical Examination:    Physical Exam  Vitals and nursing note reviewed.   Constitutional:       General: She is not in acute distress.     Appearance: Normal appearance.   HENT:      Head: Normocephalic and atraumatic.   Cardiovascular:

## 2024-03-19 ENCOUNTER — TELEPHONE (OUTPATIENT)
Age: 64
End: 2024-03-19

## 2024-03-19 DIAGNOSIS — M81.0 AGE-RELATED OSTEOPOROSIS WITHOUT CURRENT PATHOLOGICAL FRACTURE: Primary | ICD-10-CM

## 2024-03-19 LAB
25(OH)D3 SERPL-MCNC: 26.1 NG/ML (ref 30–100)
ALBUMIN SERPL-MCNC: 3.6 G/DL (ref 3.5–5)
ALBUMIN/GLOB SERPL: 1.2 (ref 1.1–2.2)
ALP SERPL-CCNC: 118 U/L (ref 45–117)
ALT SERPL-CCNC: 23 U/L (ref 12–78)
ANION GAP SERPL CALC-SCNC: 6 MMOL/L (ref 5–15)
AST SERPL-CCNC: 12 U/L (ref 15–37)
BASOPHILS # BLD: 0 K/UL (ref 0–0.1)
BASOPHILS NFR BLD: 1 % (ref 0–1)
BILIRUB SERPL-MCNC: 0.6 MG/DL (ref 0.2–1)
BUN SERPL-MCNC: 14 MG/DL (ref 6–20)
BUN/CREAT SERPL: 21 (ref 12–20)
CALCIUM SERPL-MCNC: 9.1 MG/DL (ref 8.5–10.1)
CHLORIDE SERPL-SCNC: 110 MMOL/L (ref 97–108)
CHOLEST SERPL-MCNC: 203 MG/DL
CO2 SERPL-SCNC: 29 MMOL/L (ref 21–32)
CREAT SERPL-MCNC: 0.68 MG/DL (ref 0.55–1.02)
DIFFERENTIAL METHOD BLD: NORMAL
EOSINOPHIL # BLD: 0.1 K/UL (ref 0–0.4)
EOSINOPHIL NFR BLD: 2 % (ref 0–7)
ERYTHROCYTE [DISTWIDTH] IN BLOOD BY AUTOMATED COUNT: 12.7 % (ref 11.5–14.5)
GLOBULIN SER CALC-MCNC: 3 G/DL (ref 2–4)
GLUCOSE SERPL-MCNC: 98 MG/DL (ref 65–100)
HCT VFR BLD AUTO: 45.8 % (ref 35–47)
HDLC SERPL-MCNC: 42 MG/DL
HDLC SERPL: 4.8 (ref 0–5)
HGB BLD-MCNC: 15.1 G/DL (ref 11.5–16)
HIV 1+2 AB+HIV1 P24 AG SERPL QL IA: NONREACTIVE
HIV 1/2 RESULT COMMENT: NORMAL
IMM GRANULOCYTES # BLD AUTO: 0 K/UL (ref 0–0.04)
IMM GRANULOCYTES NFR BLD AUTO: 0 % (ref 0–0.5)
LDLC SERPL CALC-MCNC: 108 MG/DL (ref 0–100)
LYMPHOCYTES # BLD: 1.5 K/UL (ref 0.8–3.5)
LYMPHOCYTES NFR BLD: 31 % (ref 12–49)
MCH RBC QN AUTO: 29.5 PG (ref 26–34)
MCHC RBC AUTO-ENTMCNC: 33 G/DL (ref 30–36.5)
MCV RBC AUTO: 89.6 FL (ref 80–99)
MONOCYTES # BLD: 0.4 K/UL (ref 0–1)
MONOCYTES NFR BLD: 8 % (ref 5–13)
NEUTS SEG # BLD: 2.8 K/UL (ref 1.8–8)
NEUTS SEG NFR BLD: 58 % (ref 32–75)
NRBC # BLD: 0 K/UL (ref 0–0.01)
NRBC BLD-RTO: 0 PER 100 WBC
PLATELET # BLD AUTO: 248 K/UL (ref 150–400)
PMV BLD AUTO: 9.4 FL (ref 8.9–12.9)
POTASSIUM SERPL-SCNC: 3.9 MMOL/L (ref 3.5–5.1)
PROT SERPL-MCNC: 6.6 G/DL (ref 6.4–8.2)
RBC # BLD AUTO: 5.11 M/UL (ref 3.8–5.2)
SODIUM SERPL-SCNC: 145 MMOL/L (ref 136–145)
TRIGL SERPL-MCNC: 265 MG/DL
VLDLC SERPL CALC-MCNC: 53 MG/DL
WBC # BLD AUTO: 4.9 K/UL (ref 3.6–11)

## 2024-03-19 RX ORDER — ERGOCALCIFEROL 1.25 MG/1
50000 CAPSULE ORAL WEEKLY
Qty: 12 CAPSULE | Refills: 1 | Status: SHIPPED | OUTPATIENT
Start: 2024-03-19

## 2024-03-19 NOTE — TELEPHONE ENCOUNTER
Labs show low vitamin D.  I have called in supplement for her that she should take weekly.  Call and inform.  All other labs are normal.

## 2024-05-20 ENCOUNTER — OFFICE VISIT (OUTPATIENT)
Age: 64
End: 2024-05-20
Payer: COMMERCIAL

## 2024-05-20 VITALS
BODY MASS INDEX: 23.49 KG/M2 | SYSTOLIC BLOOD PRESSURE: 126 MMHG | WEIGHT: 141 LBS | OXYGEN SATURATION: 99 % | HEART RATE: 75 BPM | DIASTOLIC BLOOD PRESSURE: 83 MMHG | HEIGHT: 65 IN | TEMPERATURE: 97.1 F

## 2024-05-20 DIAGNOSIS — K76.89 LIVER CYST: ICD-10-CM

## 2024-05-20 DIAGNOSIS — B18.1 CHRONIC HEPATITIS B (HCC): Primary | ICD-10-CM

## 2024-05-20 PROCEDURE — 3074F SYST BP LT 130 MM HG: CPT | Performed by: NURSE PRACTITIONER

## 2024-05-20 PROCEDURE — 91200 LIVER ELASTOGRAPHY: CPT | Performed by: NURSE PRACTITIONER

## 2024-05-20 PROCEDURE — 3079F DIAST BP 80-89 MM HG: CPT | Performed by: NURSE PRACTITIONER

## 2024-05-20 PROCEDURE — 99214 OFFICE O/P EST MOD 30 MIN: CPT | Performed by: NURSE PRACTITIONER

## 2024-05-20 ASSESSMENT — PATIENT HEALTH QUESTIONNAIRE - PHQ9
1. LITTLE INTEREST OR PLEASURE IN DOING THINGS: NOT AT ALL
SUM OF ALL RESPONSES TO PHQ QUESTIONS 1-9: 0
SUM OF ALL RESPONSES TO PHQ QUESTIONS 1-9: 0
SUM OF ALL RESPONSES TO PHQ9 QUESTIONS 1 & 2: 0
SUM OF ALL RESPONSES TO PHQ QUESTIONS 1-9: 0
DEPRESSION UNABLE TO ASSESS: FUNCTIONAL CAPACITY MOTIVATION LIMITS ACCURACY
SUM OF ALL RESPONSES TO PHQ QUESTIONS 1-9: 0
2. FEELING DOWN, DEPRESSED OR HOPELESS: NOT AT ALL

## 2024-05-20 NOTE — PROGRESS NOTES
Identified pt with two pt identifiers(name and ). Reviewed record in preparation for visit and have obtained necessary documentation.  Vitals:    24 0900   BP: 126/83   Site: Left Upper Arm   Position: Sitting   Cuff Size: Medium Adult   Pulse: 75   Temp: 97.1 °F (36.2 °C)   TempSrc: Temporal   SpO2: 99%   Weight: 64 kg (141 lb)   Height: 1.651 m (5' 5\")        Health Maintenance Review: Patient reminded of \"due or due soon\" health maintenance. I have asked the patient to contact his/her primary care provider (PCP) for follow-up on his/her health maintenance.    Coordination of Care Questionnaire:  :   1) Have you been to an emergency room, urgent care, or hospitalized since your last visit?  If yes, where when, and reason for visit? no       2. Have seen or consulted any other health care provider since your last visit?   If yes, where when, and reason for visit?  No      Patient is accompanied by self I have received verbal consent from Bandar Bennett to discuss any/all medical information while they are present in the room.   
pain, palpitations.  GI:  Negative for constipation or diarrhea.  : Negative for urinary frequency, dysuria, hematuria, nocturia.   Skin: Negative for rash.  Hematology: Negative for easy bruising, blood clots.    Musculo-skeletal: Negative for back pain, muscle pain, weakness.  Neurologic: Negative for headaches, dizziness, vertigo, memory problems not related to HE.  Psychology: Negative for anxiety, depression.     FAMILY HISTORY:  There is no family history of liver disease.      SOCIAL HISTORY:  The patient is .    The patient has 2 children,   The children have been vaccinated for HBV.  The patient has never used tobacco products.    The patient has never consumed significant amounts of alcohol.    The patient used to work in [employer].    The patient has not worked since 2013.      PHYSICAL EXAMINATION:  /83 (Site: Left Upper Arm, Position: Sitting, Cuff Size: Medium Adult)   Pulse 75   Temp 97.1 °F (36.2 °C) (Temporal)   Ht 1.651 m (5' 5\")   Wt 64 kg (141 lb)   SpO2 99%   BMI 23.46 kg/m²       General: No acute distress.   Eyes: Sclera anicteric.   Skin: No spider angiomata.  No jaundice.  No palmar erythema.  Abdomen: Soft non-tender, liver size normal to percussion/palpation.  Spleen not palpable. No obvious ascites.  Extremities: No edema.  No muscle wasting.  No gross arthritic changes.  Neurologic: Alert and oriented.  Cranial nerves grossly intact.  No asterixis.    LABORATORY STUDIES:   Latest Ref Rng 9/19/2023 3/18/2024   MICHAEL - Routine Labs      WBC 3.6 - 11.0 K/uL 4.2  4.9    ANC 1.8 - 8.0 K/UL 2.2  2.8    HGB 11.5 - 16.0 g/dL 16.4 (H)  15.1     - 400 K/uL 235  248    INR 0.9 - 1.2  0.9     AST 15 - 37 U/L 13  12 (L)    ALT 12 - 78 U/L 15  23    Alk Phos 45 - 117 U/L 88  118 (H)    Bili, Total 0.2 - 1.0 MG/DL 0.5  0.6    Bili, Direct 0.00 - 0.40 mg/dL 0.14     Albumin 3.5 - 5.0 g/dL 5.0 (H)  3.6    BUN 6 - 20 MG/DL 12  14    Creat 0.55 - 1.02 MG/DL 0.71  0.68

## 2024-05-21 LAB
ALBUMIN SERPL-MCNC: 4.1 G/DL (ref 3.5–5)
ALBUMIN/GLOB SERPL: 1.2 (ref 1.1–2.2)
ALP SERPL-CCNC: 96 U/L (ref 45–117)
ALT SERPL-CCNC: 22 U/L (ref 12–78)
ANION GAP SERPL CALC-SCNC: 4 MMOL/L (ref 5–15)
AST SERPL-CCNC: 15 U/L (ref 15–37)
BASOPHILS # BLD: 0 K/UL (ref 0–0.1)
BASOPHILS NFR BLD: 1 % (ref 0–1)
BILIRUB DIRECT SERPL-MCNC: 0.2 MG/DL (ref 0–0.2)
BILIRUB SERPL-MCNC: 0.8 MG/DL (ref 0.2–1)
BUN SERPL-MCNC: 17 MG/DL (ref 6–20)
BUN/CREAT SERPL: 24 (ref 12–20)
CALCIUM SERPL-MCNC: 9.6 MG/DL (ref 8.5–10.1)
CHLORIDE SERPL-SCNC: 107 MMOL/L (ref 97–108)
CO2 SERPL-SCNC: 29 MMOL/L (ref 21–32)
CREAT SERPL-MCNC: 0.71 MG/DL (ref 0.55–1.02)
DIFFERENTIAL METHOD BLD: NORMAL
EOSINOPHIL # BLD: 0.1 K/UL (ref 0–0.4)
EOSINOPHIL NFR BLD: 1 % (ref 0–7)
ERYTHROCYTE [DISTWIDTH] IN BLOOD BY AUTOMATED COUNT: 12.4 % (ref 11.5–14.5)
GLOBULIN SER CALC-MCNC: 3.4 G/DL (ref 2–4)
GLUCOSE SERPL-MCNC: 104 MG/DL (ref 65–100)
HBV SURFACE AB SER QL: NONREACTIVE
HBV SURFACE AB SER-ACNC: <3.1 MIU/ML
HBV SURFACE AG SER QL: ABNORMAL
HBV SURFACE AG SERPL QL CFM: NORMAL
HCT VFR BLD AUTO: 45.7 % (ref 35–47)
HGB BLD-MCNC: 15.5 G/DL (ref 11.5–16)
IMM GRANULOCYTES # BLD AUTO: 0 K/UL (ref 0–0.04)
IMM GRANULOCYTES NFR BLD AUTO: 0 % (ref 0–0.5)
INR PPP: 1 (ref 0.9–1.1)
LYMPHOCYTES # BLD: 1.4 K/UL (ref 0.8–3.5)
LYMPHOCYTES NFR BLD: 30 % (ref 12–49)
MCH RBC QN AUTO: 30.2 PG (ref 26–34)
MCHC RBC AUTO-ENTMCNC: 33.9 G/DL (ref 30–36.5)
MCV RBC AUTO: 88.9 FL (ref 80–99)
MONOCYTES # BLD: 0.3 K/UL (ref 0–1)
MONOCYTES NFR BLD: 7 % (ref 5–13)
NEUTS SEG # BLD: 2.7 K/UL (ref 1.8–8)
NEUTS SEG NFR BLD: 61 % (ref 32–75)
NRBC # BLD: 0 K/UL (ref 0–0.01)
NRBC BLD-RTO: 0 PER 100 WBC
PLATELET # BLD AUTO: 247 K/UL (ref 150–400)
PMV BLD AUTO: 9.3 FL (ref 8.9–12.9)
POTASSIUM SERPL-SCNC: 4 MMOL/L (ref 3.5–5.1)
PROT SERPL-MCNC: 7.5 G/DL (ref 6.4–8.2)
PROTHROMBIN TIME: 10.1 SEC (ref 9–11.1)
RBC # BLD AUTO: 5.14 M/UL (ref 3.8–5.2)
SODIUM SERPL-SCNC: 140 MMOL/L (ref 136–145)
WBC # BLD AUTO: 4.5 K/UL (ref 3.6–11)

## 2024-05-22 LAB
HBV CORE AB SERPL QL IA: POSITIVE
HBV DNA SERPL NAA+PROBE-ACNC: NORMAL IU/ML
HBV DNA SERPL NAA+PROBE-LOG IU: NORMAL LOG10 IU/ML
TEST INFORMATION: NORMAL

## 2024-05-23 LAB
AFP L3 MFR SERPL: NORMAL % (ref 0–9.9)
AFP SERPL-MCNC: 2.6 NG/ML (ref 0–9.2)

## 2024-05-28 ENCOUNTER — HOSPITAL ENCOUNTER (OUTPATIENT)
Facility: HOSPITAL | Age: 64
Discharge: HOME | End: 2024-05-31
Payer: COMMERCIAL

## 2024-05-28 DIAGNOSIS — B18.1 CHRONIC HEPATITIS B (HCC): ICD-10-CM

## 2024-05-28 PROCEDURE — 76700 US EXAM ABDOM COMPLETE: CPT

## 2024-09-14 SDOH — ECONOMIC STABILITY: TRANSPORTATION INSECURITY
IN THE PAST 12 MONTHS, HAS LACK OF TRANSPORTATION KEPT YOU FROM MEETINGS, WORK, OR FROM GETTING THINGS NEEDED FOR DAILY LIVING?: NO

## 2024-09-14 SDOH — ECONOMIC STABILITY: FOOD INSECURITY: WITHIN THE PAST 12 MONTHS, THE FOOD YOU BOUGHT JUST DIDN'T LAST AND YOU DIDN'T HAVE MONEY TO GET MORE.: SOMETIMES TRUE

## 2024-09-14 SDOH — ECONOMIC STABILITY: INCOME INSECURITY: HOW HARD IS IT FOR YOU TO PAY FOR THE VERY BASICS LIKE FOOD, HOUSING, MEDICAL CARE, AND HEATING?: NOT VERY HARD

## 2024-09-14 SDOH — ECONOMIC STABILITY: FOOD INSECURITY: WITHIN THE PAST 12 MONTHS, YOU WORRIED THAT YOUR FOOD WOULD RUN OUT BEFORE YOU GOT MONEY TO BUY MORE.: SOMETIMES TRUE

## 2024-09-17 ENCOUNTER — OFFICE VISIT (OUTPATIENT)
Age: 64
End: 2024-09-17
Payer: MEDICARE

## 2024-09-17 VITALS
HEIGHT: 65 IN | RESPIRATION RATE: 16 BRPM | DIASTOLIC BLOOD PRESSURE: 78 MMHG | SYSTOLIC BLOOD PRESSURE: 110 MMHG | WEIGHT: 145 LBS | TEMPERATURE: 97.8 F | HEART RATE: 81 BPM | BODY MASS INDEX: 24.16 KG/M2 | OXYGEN SATURATION: 97 %

## 2024-09-17 DIAGNOSIS — F43.21 ADJUSTMENT DISORDER WITH DEPRESSED MOOD: Primary | ICD-10-CM

## 2024-09-17 DIAGNOSIS — G47.00 INSOMNIA, UNSPECIFIED TYPE: ICD-10-CM

## 2024-09-17 DIAGNOSIS — Z23 ENCOUNTER FOR IMMUNIZATION: ICD-10-CM

## 2024-09-17 DIAGNOSIS — M75.42 IMPINGEMENT SYNDROME OF LEFT SHOULDER: ICD-10-CM

## 2024-09-17 DIAGNOSIS — Z00.00 WELCOME TO MEDICARE PREVENTIVE VISIT: ICD-10-CM

## 2024-09-17 DIAGNOSIS — B18.1 CHRONIC HEPATITIS B (HCC): ICD-10-CM

## 2024-09-17 PROCEDURE — 3074F SYST BP LT 130 MM HG: CPT | Performed by: FAMILY MEDICINE

## 2024-09-17 PROCEDURE — G0402 INITIAL PREVENTIVE EXAM: HCPCS | Performed by: FAMILY MEDICINE

## 2024-09-17 PROCEDURE — PBSHW HEP A, HAVRIX, (AGE 19 YRS+), IM: Performed by: FAMILY MEDICINE

## 2024-09-17 PROCEDURE — 99214 OFFICE O/P EST MOD 30 MIN: CPT | Performed by: FAMILY MEDICINE

## 2024-09-17 PROCEDURE — 3078F DIAST BP <80 MM HG: CPT | Performed by: FAMILY MEDICINE

## 2024-09-17 PROCEDURE — 1123F ACP DISCUSS/DSCN MKR DOCD: CPT | Performed by: FAMILY MEDICINE

## 2024-09-17 PROCEDURE — 90632 HEPA VACCINE ADULT IM: CPT | Performed by: FAMILY MEDICINE

## 2024-09-17 RX ORDER — IBUPROFEN 600 MG/1
600 TABLET, FILM COATED ORAL EVERY 8 HOURS PRN
Qty: 60 TABLET | Refills: 0 | Status: SHIPPED | OUTPATIENT
Start: 2024-09-17

## 2024-09-17 RX ORDER — TRAZODONE HYDROCHLORIDE 50 MG/1
50 TABLET, FILM COATED ORAL NIGHTLY
Qty: 90 TABLET | Refills: 1 | Status: SHIPPED | OUTPATIENT
Start: 2024-09-17

## 2024-09-17 ASSESSMENT — COLUMBIA-SUICIDE SEVERITY RATING SCALE - C-SSRS
1. WITHIN THE PAST MONTH, HAVE YOU WISHED YOU WERE DEAD OR WISHED YOU COULD GO TO SLEEP AND NOT WAKE UP?: NO
6. HAVE YOU EVER DONE ANYTHING, STARTED TO DO ANYTHING, OR PREPARED TO DO ANYTHING TO END YOUR LIFE?: NO
2. HAVE YOU ACTUALLY HAD ANY THOUGHTS OF KILLING YOURSELF?: NO

## 2024-09-17 ASSESSMENT — PATIENT HEALTH QUESTIONNAIRE - PHQ9
6. FEELING BAD ABOUT YOURSELF - OR THAT YOU ARE A FAILURE OR HAVE LET YOURSELF OR YOUR FAMILY DOWN: MORE THAN HALF THE DAYS
4. FEELING TIRED OR HAVING LITTLE ENERGY: SEVERAL DAYS
1. LITTLE INTEREST OR PLEASURE IN DOING THINGS: NEARLY EVERY DAY
SUM OF ALL RESPONSES TO PHQ QUESTIONS 1-9: 13
SUM OF ALL RESPONSES TO PHQ QUESTIONS 1-9: 13
SUM OF ALL RESPONSES TO PHQ QUESTIONS 1-9: 12
2. FEELING DOWN, DEPRESSED OR HOPELESS: MORE THAN HALF THE DAYS
10. IF YOU CHECKED OFF ANY PROBLEMS, HOW DIFFICULT HAVE THESE PROBLEMS MADE IT FOR YOU TO DO YOUR WORK, TAKE CARE OF THINGS AT HOME, OR GET ALONG WITH OTHER PEOPLE: NOT DIFFICULT AT ALL
SUM OF ALL RESPONSES TO PHQ9 QUESTIONS 1 & 2: 5
3. TROUBLE FALLING OR STAYING ASLEEP: SEVERAL DAYS
7. TROUBLE CONCENTRATING ON THINGS, SUCH AS READING THE NEWSPAPER OR WATCHING TELEVISION: SEVERAL DAYS
9. THOUGHTS THAT YOU WOULD BE BETTER OFF DEAD, OR OF HURTING YOURSELF: SEVERAL DAYS
SUM OF ALL RESPONSES TO PHQ QUESTIONS 1-9: 13
8. MOVING OR SPEAKING SO SLOWLY THAT OTHER PEOPLE COULD HAVE NOTICED. OR THE OPPOSITE, BEING SO FIGETY OR RESTLESS THAT YOU HAVE BEEN MOVING AROUND A LOT MORE THAN USUAL: SEVERAL DAYS
5. POOR APPETITE OR OVEREATING: SEVERAL DAYS

## 2024-09-17 ASSESSMENT — LIFESTYLE VARIABLES
HOW MANY STANDARD DRINKS CONTAINING ALCOHOL DO YOU HAVE ON A TYPICAL DAY: PATIENT DOES NOT DRINK
HOW OFTEN DO YOU HAVE A DRINK CONTAINING ALCOHOL: NEVER

## 2024-09-20 DIAGNOSIS — I10 PRIMARY HYPERTENSION: ICD-10-CM

## 2024-09-23 RX ORDER — VALSARTAN 160 MG/1
160 TABLET ORAL DAILY
Qty: 90 TABLET | Refills: 0 | Status: SHIPPED | OUTPATIENT
Start: 2024-09-23

## 2024-10-02 ENCOUNTER — TRANSCRIBE ORDERS (OUTPATIENT)
Facility: HOSPITAL | Age: 64
End: 2024-10-02

## 2024-10-02 DIAGNOSIS — Z12.31 VISIT FOR SCREENING MAMMOGRAM: Primary | ICD-10-CM

## 2024-10-25 ENCOUNTER — OFFICE VISIT (OUTPATIENT)
Age: 64
End: 2024-10-25
Payer: MEDICARE

## 2024-10-25 VITALS
WEIGHT: 150 LBS | HEIGHT: 65 IN | SYSTOLIC BLOOD PRESSURE: 126 MMHG | RESPIRATION RATE: 16 BRPM | BODY MASS INDEX: 24.99 KG/M2 | OXYGEN SATURATION: 97 % | TEMPERATURE: 98 F | HEART RATE: 85 BPM | DIASTOLIC BLOOD PRESSURE: 72 MMHG

## 2024-10-25 DIAGNOSIS — R05.3 CHRONIC COUGH: Primary | ICD-10-CM

## 2024-10-25 DIAGNOSIS — J45.20 MILD INTERMITTENT REACTIVE AIRWAY DISEASE WITHOUT COMPLICATION: ICD-10-CM

## 2024-10-25 DIAGNOSIS — F43.21 ADJUSTMENT DISORDER WITH DEPRESSED MOOD: ICD-10-CM

## 2024-10-25 DIAGNOSIS — G47.00 INSOMNIA, UNSPECIFIED TYPE: ICD-10-CM

## 2024-10-25 PROCEDURE — 99214 OFFICE O/P EST MOD 30 MIN: CPT | Performed by: FAMILY MEDICINE

## 2024-10-25 RX ORDER — MONTELUKAST SODIUM 10 MG/1
10 TABLET ORAL DAILY
Qty: 90 TABLET | Refills: 1 | Status: SHIPPED | OUTPATIENT
Start: 2024-10-25

## 2024-10-25 RX ORDER — ALBUTEROL SULFATE 90 UG/1
2 INHALANT RESPIRATORY (INHALATION) EVERY 6 HOURS PRN
Qty: 18 G | Refills: 3 | Status: SHIPPED | OUTPATIENT
Start: 2024-10-25

## 2024-10-25 RX ORDER — TRAZODONE HYDROCHLORIDE 50 MG/1
50 TABLET, FILM COATED ORAL NIGHTLY
Qty: 90 TABLET | Refills: 1 | Status: SHIPPED | OUTPATIENT
Start: 2024-10-25

## 2024-10-25 ASSESSMENT — PATIENT HEALTH QUESTIONNAIRE - PHQ9
SUM OF ALL RESPONSES TO PHQ QUESTIONS 1-9: 0
9. THOUGHTS THAT YOU WOULD BE BETTER OFF DEAD, OR OF HURTING YOURSELF: NOT AT ALL
SUM OF ALL RESPONSES TO PHQ QUESTIONS 1-9: 0
5. POOR APPETITE OR OVEREATING: NOT AT ALL
1. LITTLE INTEREST OR PLEASURE IN DOING THINGS: NOT AT ALL
SUM OF ALL RESPONSES TO PHQ QUESTIONS 1-9: 0
SUM OF ALL RESPONSES TO PHQ9 QUESTIONS 1 & 2: 0
3. TROUBLE FALLING OR STAYING ASLEEP: NOT AT ALL
10. IF YOU CHECKED OFF ANY PROBLEMS, HOW DIFFICULT HAVE THESE PROBLEMS MADE IT FOR YOU TO DO YOUR WORK, TAKE CARE OF THINGS AT HOME, OR GET ALONG WITH OTHER PEOPLE: NOT DIFFICULT AT ALL
2. FEELING DOWN, DEPRESSED OR HOPELESS: NOT AT ALL
4. FEELING TIRED OR HAVING LITTLE ENERGY: NOT AT ALL
8. MOVING OR SPEAKING SO SLOWLY THAT OTHER PEOPLE COULD HAVE NOTICED. OR THE OPPOSITE, BEING SO FIGETY OR RESTLESS THAT YOU HAVE BEEN MOVING AROUND A LOT MORE THAN USUAL: NOT AT ALL
7. TROUBLE CONCENTRATING ON THINGS, SUCH AS READING THE NEWSPAPER OR WATCHING TELEVISION: NOT AT ALL
6. FEELING BAD ABOUT YOURSELF - OR THAT YOU ARE A FAILURE OR HAVE LET YOURSELF OR YOUR FAMILY DOWN: NOT AT ALL
SUM OF ALL RESPONSES TO PHQ QUESTIONS 1-9: 0

## 2024-10-25 NOTE — PROGRESS NOTES
Identified pt with two pt identifiers(name and )    Chief Complaint   Patient presents with    Cough     Night         Health Maintenance Due   Topic    DTaP/Tdap/Td vaccine (1 - Tdap)    Respiratory Syncytial Virus (RSV) Pregnant or age 60 yrs+ (1 - 1-dose 60+ series)       Wt Readings from Last 3 Encounters:   10/25/24 68 kg (150 lb)   24 65.8 kg (145 lb)   24 64 kg (141 lb)     Temp Readings from Last 3 Encounters:   10/25/24 98 °F (36.7 °C) (Temporal)   24 97.8 °F (36.6 °C) (Temporal)   24 97.1 °F (36.2 °C) (Temporal)     BP Readings from Last 3 Encounters:   10/25/24 126/72   24 110/78   24 126/83     Pulse Readings from Last 3 Encounters:   10/25/24 85   24 81   24 75           Depression Screening:  :         10/25/2024     1:07 PM 2024     8:27 AM 2024     9:03 AM 3/18/2024     8:54 AM 2023     9:35 AM 3/15/2023    10:59 AM 2022    10:42 AM   PHQ-9 Questionaire   Little interest or pleasure in doing things 0 3 0 0 0 0 0   Feeling down, depressed, or hopeless 0 2 0 0 0 0 0   Trouble falling or staying asleep, or sleeping too much 0 1        Feeling tired or having little energy 0 1        Poor appetite or overeating 0 1        Feeling bad about yourself - or that you are a failure or have let yourself or your family down 0 2        Trouble concentrating on things, such as reading the newspaper or watching television 0 1        Moving or speaking so slowly that other people could have noticed. Or the opposite - being so fidgety or restless that you have been moving around a lot more than usual 0 1        Thoughts that you would be better off dead, or of hurting yourself in some way 0 1        PHQ-9 Total Score 0 13 0 0 0 0 0   If you checked off any problems, how difficult have these problems made it for you to do your work, take care of things at home, or get along with other people? 0 0             Fall Risk Assessment:  :         
°F (36.7 °C)   TempSrc: Temporal   SpO2: 97%   Weight: 68 kg (150 lb)   Height: 1.651 m (5' 5\")     Physical Examination:    Physical Exam  Vitals and nursing note reviewed.   Constitutional:       General: She is not in acute distress.     Appearance: Normal appearance.   HENT:      Head: Normocephalic and atraumatic.      Right Ear: Tympanic membrane, ear canal and external ear normal. There is no impacted cerumen.      Left Ear: Tympanic membrane, ear canal and external ear normal. There is no impacted cerumen.      Nose: Nose normal. No congestion or rhinorrhea.      Mouth/Throat:      Mouth: Mucous membranes are moist.      Pharynx: Oropharynx is clear. No oropharyngeal exudate or posterior oropharyngeal erythema.   Cardiovascular:      Rate and Rhythm: Normal rate and regular rhythm.      Heart sounds: Normal heart sounds.   Pulmonary:      Effort: Pulmonary effort is normal.      Breath sounds: Normal breath sounds.   Musculoskeletal:      Cervical back: Normal range of motion.   Lymphadenopathy:      Cervical: No cervical adenopathy.   Skin:     General: Skin is warm and dry.      Findings: No rash.   Neurological:      General: No focal deficit present.      Mental Status: She is alert and oriented to person, place, and time.      Cranial Nerves: No cranial nerve deficit.   Psychiatric:         Mood and Affect: Mood normal.         Behavior: Behavior normal.         No results found for this visit on 10/25/24.    Assessment/ Plan:   Bandar was seen today for cough.    Diagnoses and all orders for this visit:    Chronic cough  -     XR CHEST (2 VIEWS); Future    Adjustment disorder with depressed mood  -     traZODone (DESYREL) 50 MG tablet; Take 1 tablet by mouth nightly    Mild intermittent reactive airway disease without complication  -     montelukast (SINGULAIR) 10 MG tablet; Take 1 tablet by mouth daily  -     albuterol sulfate HFA (PROVENTIL;VENTOLIN;PROAIR) 108 (90 Base) MCG/ACT inhaler; Inhale 2 puffs

## 2024-10-28 DIAGNOSIS — M75.42 IMPINGEMENT SYNDROME OF LEFT SHOULDER: ICD-10-CM

## 2024-10-28 DIAGNOSIS — M81.0 OSTEOPOROSIS, UNSPECIFIED OSTEOPOROSIS TYPE, UNSPECIFIED PATHOLOGICAL FRACTURE PRESENCE: ICD-10-CM

## 2024-10-28 DIAGNOSIS — M81.0 AGE-RELATED OSTEOPOROSIS WITHOUT CURRENT PATHOLOGICAL FRACTURE: ICD-10-CM

## 2024-10-28 RX ORDER — ERGOCALCIFEROL 1.25 MG/1
50000 CAPSULE, LIQUID FILLED ORAL WEEKLY
Qty: 12 CAPSULE | Refills: 1 | Status: SHIPPED | OUTPATIENT
Start: 2024-10-28

## 2024-10-28 RX ORDER — IBUPROFEN 600 MG/1
600 TABLET, FILM COATED ORAL EVERY 8 HOURS PRN
Qty: 60 TABLET | Refills: 0 | Status: SHIPPED | OUTPATIENT
Start: 2024-10-28

## 2024-10-28 RX ORDER — ALENDRONATE SODIUM 70 MG/1
TABLET ORAL
Qty: 12 TABLET | Refills: 1 | Status: SHIPPED | OUTPATIENT
Start: 2024-10-28

## 2024-10-31 ENCOUNTER — HOSPITAL ENCOUNTER (OUTPATIENT)
Facility: HOSPITAL | Age: 64
Discharge: HOME | End: 2024-11-03
Payer: MEDICARE

## 2024-10-31 DIAGNOSIS — R05.3 CHRONIC COUGH: ICD-10-CM

## 2024-10-31 PROCEDURE — 71046 X-RAY EXAM CHEST 2 VIEWS: CPT

## 2024-11-06 ENCOUNTER — TELEPHONE (OUTPATIENT)
Age: 64
End: 2024-11-06

## 2024-11-06 NOTE — TELEPHONE ENCOUNTER
[facility] called requesting four scripts be sent to them.    I called patient to confirm she is using the mail order, instead of [facility]. Because she has refills remaining at local pharmacy.     Patient stated that she DOES NOT want to use mail order [facility] pharmacy. She prefers to [facility] local [facility].     Disregard any requests from [facility], per patient's request!!

## 2024-11-07 ENCOUNTER — OFFICE VISIT (OUTPATIENT)
Age: 64
End: 2024-11-07
Payer: MEDICARE

## 2024-11-07 VITALS
BODY MASS INDEX: 24.99 KG/M2 | DIASTOLIC BLOOD PRESSURE: 85 MMHG | SYSTOLIC BLOOD PRESSURE: 134 MMHG | WEIGHT: 150 LBS | HEIGHT: 65 IN | RESPIRATION RATE: 16 BRPM | TEMPERATURE: 97.2 F | OXYGEN SATURATION: 97 % | HEART RATE: 77 BPM

## 2024-11-07 DIAGNOSIS — B18.1 CHRONIC VIRAL HEPATITIS B WITHOUT DELTA AGENT AND WITHOUT COMA (HCC): Primary | ICD-10-CM

## 2024-11-07 DIAGNOSIS — Z11.59 SCREENING FOR VIRAL DISEASE: ICD-10-CM

## 2024-11-07 LAB
ALBUMIN SERPL-MCNC: 3.9 G/DL (ref 3.5–5)
ALBUMIN/GLOB SERPL: 1.2 (ref 1.1–2.2)
ALP SERPL-CCNC: 115 U/L (ref 45–117)
ALT SERPL-CCNC: 24 U/L (ref 12–78)
ANION GAP SERPL CALC-SCNC: 4 MMOL/L (ref 2–12)
AST SERPL-CCNC: 10 U/L (ref 15–37)
BASOPHILS # BLD: 0.1 K/UL (ref 0–0.1)
BASOPHILS NFR BLD: 1 % (ref 0–1)
BILIRUB DIRECT SERPL-MCNC: 0.2 MG/DL (ref 0–0.2)
BILIRUB SERPL-MCNC: 0.5 MG/DL (ref 0.2–1)
BUN SERPL-MCNC: 16 MG/DL (ref 6–20)
BUN/CREAT SERPL: 25 (ref 12–20)
CALCIUM SERPL-MCNC: 8.8 MG/DL (ref 8.5–10.1)
CHLORIDE SERPL-SCNC: 108 MMOL/L (ref 97–108)
CO2 SERPL-SCNC: 28 MMOL/L (ref 21–32)
CREAT SERPL-MCNC: 0.64 MG/DL (ref 0.55–1.02)
DIFFERENTIAL METHOD BLD: NORMAL
EOSINOPHIL # BLD: 0.1 K/UL (ref 0–0.4)
EOSINOPHIL NFR BLD: 2 % (ref 0–7)
ERYTHROCYTE [DISTWIDTH] IN BLOOD BY AUTOMATED COUNT: 12.1 % (ref 11.5–14.5)
GLOBULIN SER CALC-MCNC: 3.3 G/DL (ref 2–4)
GLUCOSE SERPL-MCNC: 84 MG/DL (ref 65–100)
HBV SURFACE AB SER QL: NONREACTIVE
HBV SURFACE AB SER-ACNC: <3.1 MIU/ML
HBV SURFACE AG SER QL: ABNORMAL
HBV SURFACE AG SERPL QL CFM: NORMAL
HCT VFR BLD AUTO: 45.6 % (ref 35–47)
HGB BLD-MCNC: 15.2 G/DL (ref 11.5–16)
IMM GRANULOCYTES # BLD AUTO: 0 K/UL (ref 0–0.04)
IMM GRANULOCYTES NFR BLD AUTO: 0 % (ref 0–0.5)
INR PPP: 0.9 (ref 0.9–1.1)
LYMPHOCYTES # BLD: 1.8 K/UL (ref 0.8–3.5)
LYMPHOCYTES NFR BLD: 31 % (ref 12–49)
MCH RBC QN AUTO: 29.9 PG (ref 26–34)
MCHC RBC AUTO-ENTMCNC: 33.3 G/DL (ref 30–36.5)
MCV RBC AUTO: 89.8 FL (ref 80–99)
MONOCYTES # BLD: 0.5 K/UL (ref 0–1)
MONOCYTES NFR BLD: 10 % (ref 5–13)
NEUTS SEG # BLD: 3.2 K/UL (ref 1.8–8)
NEUTS SEG NFR BLD: 56 % (ref 32–75)
NRBC # BLD: 0 K/UL (ref 0–0.01)
NRBC BLD-RTO: 0 PER 100 WBC
PLATELET # BLD AUTO: 247 K/UL (ref 150–400)
PMV BLD AUTO: 9.3 FL (ref 8.9–12.9)
POTASSIUM SERPL-SCNC: 4 MMOL/L (ref 3.5–5.1)
PROT SERPL-MCNC: 7.2 G/DL (ref 6.4–8.2)
PROTHROMBIN TIME: 9.6 SEC (ref 9–11.1)
RBC # BLD AUTO: 5.08 M/UL (ref 3.8–5.2)
SODIUM SERPL-SCNC: 140 MMOL/L (ref 136–145)
WBC # BLD AUTO: 5.7 K/UL (ref 3.6–11)

## 2024-11-07 PROCEDURE — 3075F SYST BP GE 130 - 139MM HG: CPT | Performed by: NURSE PRACTITIONER

## 2024-11-07 PROCEDURE — 99214 OFFICE O/P EST MOD 30 MIN: CPT | Performed by: NURSE PRACTITIONER

## 2024-11-07 PROCEDURE — 3079F DIAST BP 80-89 MM HG: CPT | Performed by: NURSE PRACTITIONER

## 2024-11-07 PROCEDURE — 1123F ACP DISCUSS/DSCN MKR DOCD: CPT | Performed by: NURSE PRACTITIONER

## 2024-11-07 ASSESSMENT — PATIENT HEALTH QUESTIONNAIRE - PHQ9
SUM OF ALL RESPONSES TO PHQ QUESTIONS 1-9: 0
1. LITTLE INTEREST OR PLEASURE IN DOING THINGS: NOT AT ALL
2. FEELING DOWN, DEPRESSED OR HOPELESS: NOT AT ALL
SUM OF ALL RESPONSES TO PHQ QUESTIONS 1-9: 0
SUM OF ALL RESPONSES TO PHQ QUESTIONS 1-9: 0
SUM OF ALL RESPONSES TO PHQ9 QUESTIONS 1 & 2: 0
SUM OF ALL RESPONSES TO PHQ QUESTIONS 1-9: 0

## 2024-11-07 NOTE — PROGRESS NOTES
Identified pt with two pt identifiers(name and ). Reviewed record in preparation for visit and have obtained necessary documentation. All patient medications has been reviewed.  Chief Complaint   Patient presents with    Follow-up             Wt Readings from Last 3 Encounters:   24 68 kg (150 lb)   10/25/24 68 kg (150 lb)   24 65.8 kg (145 lb)     Temp Readings from Last 3 Encounters:   24 97.2 °F (36.2 °C) (Temporal)   10/25/24 98 °F (36.7 °C) (Temporal)   24 97.8 °F (36.6 °C) (Temporal)     BP Readings from Last 3 Encounters:   24 134/85   10/25/24 126/72   24 110/78     Pulse Readings from Last 3 Encounters:   24 77   10/25/24 85   24 81       \"Have you been to the ER, urgent care clinic since your last visit?  Hospitalized since your last visit?\"    Yes Urgent Care  2024    “Have you seen or consulted any other health care providers outside of [facility] since your last visit?”    NO    Click Here for Release of Records Request          
ng/mL 0.4      Latest Ref Rng 9/19/2023 5/20/2024   MICHAEL - Cancer Screening      AFP 0.0 - 9.2 ng/mL 2.5  2.6    AFP-L3% 0.0 - 9.9 % Comment  Comment      Laboratory testing from 5/20/2024 reviewed in detail. Additional testing included to evaluate progression or regression of disease. Laboratory testing results from today will be communicated by My Chart.     SEROLOGIES:  Serologies Latest Ref Rng & Units 9/21/2021   Hep A Ab, Total Negative   Negative   Hep B Surface Ag Index 441.14   Hep B Surface Ag Interp Negative   Positive (A)   Hep B Core Ab, Total Negative   Positive (A)   Hep B Surface Ab mIU/mL <3.10   Hep B Surface Ab Interp NONREACTIVE   NONREACTIVE   Hep C Ab 0.0 - 0.9 s/co ratio <0.1     9/2021.  HBEantigen negative, anti-HBE positive.    Virology HBV DNA   Latest Ref Rng & Units IU/mL   3/14/2022 <10   9/21/2021 <10     Serologies Latest Ref Rng & Units 3/15/2023 3/14/2022   Hep B Surface Ag Negative Positive (A) Positive (A)     Serologies Latest Ref Rng & Units 3/15/2023 3/14/2022   Hep B Surface AB QL  Non Reactive Non Reactive      Latest Ref Rng 5/20/2024 11/7/2024   MICHAEL - Serologies      Hep B Core Ab, Total Negative   Positive !  Positive !    Hep B Surface Ab mIU/mL <3.10  <3.10    Hep B S Ab Interp NONREACTIVE   NONREACTIVE  NONREACTIVE         MICHAEL - Virology    HBV DNA Avinash   Latest Ref Rng IU/mL   9/19/2023 HBV DNA not detected    5/20/2024 HBV DNA not detected    11/7/2024 HBV DNA not detected      LIVER HISTOLOGY:  3/2022.  FibroScan performed at [facility]. EkPa was 5.4.  IQR/med 28%.  .  The results suggested a fibrosis level of F0. The CAP score suggests there is hepatic steatosis.    5/2024.  FibroScan performed at [facility]. EkPa was 5.4.  IQR/med 27%.  .   The results suggested a fibrosis level of F0. The CAP score suggests there is no significant hepatic steatosis.    ENDOSCOPIC PROCEDURES:  Not available or

## 2024-11-12 ENCOUNTER — CLINICAL DOCUMENTATION (OUTPATIENT)
Age: 64
End: 2024-11-12

## 2024-11-12 DIAGNOSIS — B18.1 CHRONIC VIRAL HEPATITIS B WITHOUT DELTA AGENT AND WITHOUT COMA (HCC): Primary | ICD-10-CM

## 2024-11-13 LAB
AFP L3 MFR SERPL: NORMAL % (ref 0–9.9)
AFP SERPL-MCNC: 2.1 NG/ML (ref 0–9.2)

## 2024-12-14 DIAGNOSIS — J45.20 MILD INTERMITTENT REACTIVE AIRWAY DISEASE WITHOUT COMPLICATION: ICD-10-CM

## 2024-12-16 RX ORDER — ALBUTEROL SULFATE 90 UG/1
2 INHALANT RESPIRATORY (INHALATION) EVERY 6 HOURS PRN
Qty: 18 G | Refills: 2 | Status: SHIPPED | OUTPATIENT
Start: 2024-12-16

## 2024-12-17 DIAGNOSIS — F43.21 ADJUSTMENT DISORDER WITH DEPRESSED MOOD: ICD-10-CM

## 2024-12-17 DIAGNOSIS — G47.00 INSOMNIA, UNSPECIFIED TYPE: ICD-10-CM

## 2024-12-17 RX ORDER — TRAZODONE HYDROCHLORIDE 50 MG/1
50 TABLET, FILM COATED ORAL NIGHTLY
Qty: 90 TABLET | Refills: 1 | Status: SHIPPED | OUTPATIENT
Start: 2024-12-17

## 2024-12-17 NOTE — TELEPHONE ENCOUNTER
traZODone (DESYREL) 50 MG tablet     [facility]/PHARMACY #[id number] - [place], [place] - [address] [place] [address] - P [phone] - F [phone]      No refills left at [facility]. Script was transferred out to [facility], but patient is not using mail order pharmacy (see 11/06/2024 telephone encounter)         Note        You  Bandar Arriola8 hours ago (3:35 PM)     DD  Please have your pharmacy send the refill request. This is the most accurate and fastest way to get refills.      This [place] message has not been read.     Bandar Alexis [facility] Clinical Staff (supporting Kiran Tapia MD)21 hours ago (12:11 PM)     CF  I need refill Trazodone 50mg please thank you

## 2025-01-02 ENCOUNTER — OFFICE VISIT (OUTPATIENT)
Age: 65
End: 2025-01-02
Payer: MEDICARE

## 2025-01-02 VITALS
TEMPERATURE: 97 F | HEART RATE: 84 BPM | BODY MASS INDEX: 26.29 KG/M2 | HEIGHT: 64 IN | DIASTOLIC BLOOD PRESSURE: 72 MMHG | WEIGHT: 154 LBS | SYSTOLIC BLOOD PRESSURE: 136 MMHG | RESPIRATION RATE: 16 BRPM | OXYGEN SATURATION: 98 %

## 2025-01-02 DIAGNOSIS — B18.1 CHRONIC VIRAL HEPATITIS B WITHOUT DELTA AGENT AND WITHOUT COMA (HCC): ICD-10-CM

## 2025-01-02 DIAGNOSIS — G47.00 INSOMNIA, UNSPECIFIED TYPE: ICD-10-CM

## 2025-01-02 DIAGNOSIS — M81.0 OSTEOPOROSIS, UNSPECIFIED OSTEOPOROSIS TYPE, UNSPECIFIED PATHOLOGICAL FRACTURE PRESENCE: ICD-10-CM

## 2025-01-02 DIAGNOSIS — M19.042 PRIMARY OSTEOARTHRITIS OF LEFT HAND: ICD-10-CM

## 2025-01-02 DIAGNOSIS — Z00.00 MEDICARE ANNUAL WELLNESS VISIT, SUBSEQUENT: Primary | ICD-10-CM

## 2025-01-02 DIAGNOSIS — H10.11 ALLERGIC CONJUNCTIVITIS OF RIGHT EYE: ICD-10-CM

## 2025-01-02 DIAGNOSIS — E66.3 OVERWEIGHT (BMI 25.0-29.9): ICD-10-CM

## 2025-01-02 DIAGNOSIS — M81.0 AGE-RELATED OSTEOPOROSIS WITHOUT CURRENT PATHOLOGICAL FRACTURE: ICD-10-CM

## 2025-01-02 DIAGNOSIS — I10 ESSENTIAL (PRIMARY) HYPERTENSION: ICD-10-CM

## 2025-01-02 PROCEDURE — 3078F DIAST BP <80 MM HG: CPT | Performed by: FAMILY MEDICINE

## 2025-01-02 PROCEDURE — 99214 OFFICE O/P EST MOD 30 MIN: CPT | Performed by: FAMILY MEDICINE

## 2025-01-02 PROCEDURE — 3075F SYST BP GE 130 - 139MM HG: CPT | Performed by: FAMILY MEDICINE

## 2025-01-02 PROCEDURE — 1123F ACP DISCUSS/DSCN MKR DOCD: CPT | Performed by: FAMILY MEDICINE

## 2025-01-02 PROCEDURE — G0439 PPPS, SUBSEQ VISIT: HCPCS | Performed by: FAMILY MEDICINE

## 2025-01-02 RX ORDER — ALENDRONATE SODIUM 70 MG/1
70 TABLET ORAL
Qty: 12 TABLET | Refills: 1 | Status: SHIPPED | OUTPATIENT
Start: 2025-01-02

## 2025-01-02 RX ORDER — ERGOCALCIFEROL 1.25 MG/1
50000 CAPSULE, LIQUID FILLED ORAL WEEKLY
Qty: 12 CAPSULE | Refills: 1 | Status: SHIPPED | OUTPATIENT
Start: 2025-01-02

## 2025-01-02 RX ORDER — KETOTIFEN FUMARATE 0.35 MG/ML
1 SOLUTION/ DROPS OPHTHALMIC 2 TIMES DAILY
Qty: 5 ML | Refills: 0 | Status: SHIPPED | OUTPATIENT
Start: 2025-01-02

## 2025-01-02 RX ORDER — CETIRIZINE HYDROCHLORIDE 10 MG/1
10 TABLET ORAL DAILY
Qty: 90 TABLET | Refills: 0 | Status: SHIPPED | OUTPATIENT
Start: 2025-01-02

## 2025-01-02 ASSESSMENT — PATIENT HEALTH QUESTIONNAIRE - PHQ9
SUM OF ALL RESPONSES TO PHQ QUESTIONS 1-9: 0
2. FEELING DOWN, DEPRESSED OR HOPELESS: NOT AT ALL
1. LITTLE INTEREST OR PLEASURE IN DOING THINGS: NOT AT ALL
SUM OF ALL RESPONSES TO PHQ9 QUESTIONS 1 & 2: 0
SUM OF ALL RESPONSES TO PHQ QUESTIONS 1-9: 0

## 2025-01-02 NOTE — PROGRESS NOTES
Identified pt with two pt identifiers(name and )    Chief Complaint   Patient presents with    Medicare AWV    Follow-up    Finger Pain     Thumb on left hand    Eye Problem     Right eye swollen.    Discuss Medications     She has some medication questions about vit D and if she should continue.         Health Maintenance Due   Topic    DTaP/Tdap/Td vaccine (1 - Tdap)    Respiratory Syncytial Virus (RSV) Pregnant or age 60 yrs+ (1 - Risk 60-74 years 1-dose series)    Annual Wellness Visit (Medicare Advantage)        Wt Readings from Last 3 Encounters:   25 69.9 kg (154 lb)   24 68 kg (150 lb)   10/25/24 68 kg (150 lb)     Temp Readings from Last 3 Encounters:   25 97 °F (36.1 °C) (Temporal)   24 97.2 °F (36.2 °C) (Temporal)   10/25/24 98 °F (36.7 °C) (Temporal)     BP Readings from Last 3 Encounters:   25 136/72   24 134/85   10/25/24 126/72     Pulse Readings from Last 3 Encounters:   25 84   24 77   10/25/24 85           Depression Screening:  :         2025     9:35 AM 2024    10:31 AM 10/25/2024     1:07 PM 2024     8:27 AM 2024     9:03 AM 3/18/2024     8:54 AM 2023     9:35 AM   PHQ-9 Questionaire   Little interest or pleasure in doing things 0 0 0 3 0 0 0   Feeling down, depressed, or hopeless 0 0 0 2 0 0 0   Trouble falling or staying asleep, or sleeping too much   0 1      Feeling tired or having little energy   0 1      Poor appetite or overeating   0 1      Feeling bad about yourself - or that you are a failure or have let yourself or your family down   0 2      Trouble concentrating on things, such as reading the newspaper or watching television   0 1      Moving or speaking so slowly that other people could have noticed. Or the opposite - being so fidgety or restless that you have been moving around a lot more than usual   0 1      Thoughts that you would be better off dead, or of hurting yourself in some way   0 1      PHQ-9

## 2025-01-02 NOTE — PROGRESS NOTES
[facility]  [facility] [place] [address]  Phone: [phone]  Fax: [phone]        Chief Complaint   Patient presents with    Medicare AWV    Follow-up    Finger Pain     Thumb on left hand    Eye Problem     Right eye swollen.    Discuss Medications     She has some medication questions about vit D and if she should continue.      She is a 64 y.o. female who presents for follow up visit.    He is complaining of right eyelid swelling and puffiness.  Eyes are constantly irritated.  Has tried some OTC eye drops--not helping.  Vision is unchanged.  Goes for her yearly eye exam.  Never had allergies.  No recent illness.  Says that this started after she blew leaves.    Also complaining of pain in her left thumb.  Has been swelling.  Has been going on for 1 month.  Ice has helped.  Notes increased swelling in her fingers.  Has not taken anything for this.  Has been doing a lot more work and helping out at the [employer] over the past few weeks--thinks that this is making things worse.    Continues on the trazodone.  Reports that this is helping with her mood and sleep.  Says that her son is doing better and is working.    Prior to Visit Medications    Medication Sig Taking? Authorizing Provider   vitamin D (ERGOCALCIFEROL) 1.25 MG (32041 UT) CAPS capsule Take 1 capsule by mouth Once a week at 5 PM Yes Kiran Tapia MD   ketotifen fumarate (ZADITOR) 0.035 % ophthalmic solution Place 1 drop into the right eye 2 times daily Yes Kiran Tapia MD   cetirizine (ZYRTEC) 10 MG tablet Take 1 tablet by mouth daily Yes Kiran Tapia MD   diclofenac sodium (VOLTAREN) 1 % GEL Apply 4 g topically 4 times daily Yes Kiran Tapia MD   alendronate (FOSAMAX) 70 MG tablet Take 1 tablet by mouth every 7 days Yes Kiran Tapia MD   traZODone (DESYREL) 50 MG tablet Take 1 tablet by mouth nightly Yes Kiran Tapia MD   albuterol sulfate HFA (PROVENTIL;VENTOLIN;PROAIR) 108 (90 Base) MCG/ACT inhaler

## 2025-01-02 NOTE — PATIENT INSTRUCTIONS
chances of quitting for good. Quitting is one of the most important things you can do to protect your heart. It is never too late to quit. Try to avoid secondhand smoke too.     Stay at a weight that's healthy for you. Talk to your doctor if you need help losing weight.     Try to get 7 to 9 hours of sleep each night.     Limit alcohol to 2 drinks a day for men and 1 drink a day for women. Too much alcohol can cause health problems.     Manage other health problems such as diabetes, high blood pressure, and high cholesterol. If you think you may have a problem with alcohol or drug use, talk to your doctor.   Medicines    Take your medicines exactly as prescribed. Call your doctor if you think you are having a problem with your medicine.     If your doctor recommends aspirin, take the amount directed each day. Make sure you take aspirin and not another kind of pain reliever, such as acetaminophen (Tylenol).   When should you call for help?   Call 911 if you have symptoms of a heart attack. These may include:    Chest pain or pressure, or a strange feeling in the chest.     Sweating.     Shortness of breath.     Pain, pressure, or a strange feeling in the back, neck, jaw, or upper belly or in one or both shoulders or arms.     Lightheadedness or sudden weakness.     A fast or irregular heartbeat.   After you call 911, the  may tell you to chew 1 adult-strength or 2 to 4 low-dose aspirin. Wait for an ambulance. Do not try to drive yourself.  Watch closely for changes in your health, and be sure to contact your doctor if you have any problems.  Where can you learn more?  Go to https://www.healthLogoworks.net/patientEd and enter F075 to learn more about \"A Healthy Heart: Care Instructions.\"  Current as of: June 24, 2023  Content Version: 14.2  © 2024 [employer].   Care instructions adapted under license by [employer]. If you have questions about a medical condition or this instruction, always ask your

## 2025-01-06 ENCOUNTER — TRANSCRIBE ORDERS (OUTPATIENT)
Facility: HOSPITAL | Age: 65
End: 2025-01-06

## 2025-01-06 DIAGNOSIS — M54.16 LUMBAR RADICULOPATHY: Primary | ICD-10-CM

## 2025-01-09 ENCOUNTER — TELEPHONE (OUTPATIENT)
Age: 65
End: 2025-01-09

## 2025-01-09 DIAGNOSIS — H02.843 SWELLING OF EYELID, RIGHT: Primary | ICD-10-CM

## 2025-01-09 NOTE — TELEPHONE ENCOUNTER
Pt called and said under her right eye it is still very red. Pt would like to know next steps. Please advise.

## 2025-01-17 DIAGNOSIS — I10 PRIMARY HYPERTENSION: ICD-10-CM

## 2025-01-20 RX ORDER — VALSARTAN 160 MG/1
160 TABLET ORAL DAILY
Qty: 90 TABLET | Refills: 1 | Status: SHIPPED | OUTPATIENT
Start: 2025-01-20

## 2025-02-05 SDOH — ECONOMIC STABILITY: FOOD INSECURITY: WITHIN THE PAST 12 MONTHS, THE FOOD YOU BOUGHT JUST DIDN'T LAST AND YOU DIDN'T HAVE MONEY TO GET MORE.: SOMETIMES TRUE

## 2025-02-05 SDOH — ECONOMIC STABILITY: FOOD INSECURITY: WITHIN THE PAST 12 MONTHS, YOU WORRIED THAT YOUR FOOD WOULD RUN OUT BEFORE YOU GOT MONEY TO BUY MORE.: SOMETIMES TRUE

## 2025-02-05 SDOH — ECONOMIC STABILITY: INCOME INSECURITY: IN THE LAST 12 MONTHS, WAS THERE A TIME WHEN YOU WERE NOT ABLE TO PAY THE MORTGAGE OR RENT ON TIME?: NO

## 2025-02-05 SDOH — ECONOMIC STABILITY: TRANSPORTATION INSECURITY
IN THE PAST 12 MONTHS, HAS THE LACK OF TRANSPORTATION KEPT YOU FROM MEDICAL APPOINTMENTS OR FROM GETTING MEDICATIONS?: NO

## 2025-02-06 ENCOUNTER — OFFICE VISIT (OUTPATIENT)
Age: 65
End: 2025-02-06
Payer: MEDICARE

## 2025-02-06 VITALS
OXYGEN SATURATION: 97 % | DIASTOLIC BLOOD PRESSURE: 70 MMHG | HEIGHT: 64 IN | TEMPERATURE: 97.9 F | WEIGHT: 156 LBS | HEART RATE: 92 BPM | BODY MASS INDEX: 26.63 KG/M2 | SYSTOLIC BLOOD PRESSURE: 112 MMHG | RESPIRATION RATE: 16 BRPM

## 2025-02-06 DIAGNOSIS — H01.005 BLEPHARITIS OF LOWER EYELIDS OF BOTH EYES, UNSPECIFIED TYPE: Primary | ICD-10-CM

## 2025-02-06 DIAGNOSIS — H01.002 BLEPHARITIS OF LOWER EYELIDS OF BOTH EYES, UNSPECIFIED TYPE: Primary | ICD-10-CM

## 2025-02-06 DIAGNOSIS — E66.3 OVERWEIGHT (BMI 25.0-29.9): ICD-10-CM

## 2025-02-06 DIAGNOSIS — I10 ESSENTIAL (PRIMARY) HYPERTENSION: ICD-10-CM

## 2025-02-06 PROCEDURE — 99213 OFFICE O/P EST LOW 20 MIN: CPT | Performed by: FAMILY MEDICINE

## 2025-02-06 PROCEDURE — 3074F SYST BP LT 130 MM HG: CPT | Performed by: FAMILY MEDICINE

## 2025-02-06 PROCEDURE — 3078F DIAST BP <80 MM HG: CPT | Performed by: FAMILY MEDICINE

## 2025-02-06 PROCEDURE — G2211 COMPLEX E/M VISIT ADD ON: HCPCS | Performed by: FAMILY MEDICINE

## 2025-02-06 PROCEDURE — 1123F ACP DISCUSS/DSCN MKR DOCD: CPT | Performed by: FAMILY MEDICINE

## 2025-02-06 RX ORDER — TOBRAMYCIN AND DEXAMETHASONE 3; 1 MG/ML; MG/ML
SUSPENSION/ DROPS OPHTHALMIC
COMMUNITY
Start: 2025-01-13 | End: 2025-02-06

## 2025-02-06 RX ORDER — BACITRACIN ZINC AND POLYMYXIN B SULFATE 500; 10000 [USP'U]/G; [USP'U]/G
OINTMENT OPHTHALMIC 2 TIMES DAILY
Qty: 3.5 G | Refills: 0 | Status: SHIPPED | OUTPATIENT
Start: 2025-02-06 | End: 2025-02-16

## 2025-02-06 ASSESSMENT — PATIENT HEALTH QUESTIONNAIRE - PHQ9
2. FEELING DOWN, DEPRESSED OR HOPELESS: NOT AT ALL
SUM OF ALL RESPONSES TO PHQ QUESTIONS 1-9: 0
SUM OF ALL RESPONSES TO PHQ QUESTIONS 1-9: 0
SUM OF ALL RESPONSES TO PHQ9 QUESTIONS 1 & 2: 0
1. LITTLE INTEREST OR PLEASURE IN DOING THINGS: NOT AT ALL
SUM OF ALL RESPONSES TO PHQ QUESTIONS 1-9: 0
SUM OF ALL RESPONSES TO PHQ QUESTIONS 1-9: 0

## 2025-02-06 NOTE — PROGRESS NOTES
Identified pt with two pt identifiers(name and )    Chief Complaint   Patient presents with    Follow-up     Print AVS for resource.     Allergic Reaction     Around eyes        Health Maintenance Due   Topic    DTaP/Tdap/Td vaccine (1 - Tdap)    Respiratory Syncytial Virus (RSV) Pregnant or age 60 yrs+ (1 - Risk 60-74 years 1-dose series)       Wt Readings from Last 3 Encounters:   25 70.8 kg (156 lb)   25 69.9 kg (154 lb)   24 68 kg (150 lb)     Temp Readings from Last 3 Encounters:   25 97.9 °F (36.6 °C) (Temporal)   25 97 °F (36.1 °C) (Temporal)   24 97.2 °F (36.2 °C) (Temporal)     BP Readings from Last 3 Encounters:   25 112/70   25 136/72   24 134/85     Pulse Readings from Last 3 Encounters:   25 92   25 84   24 77           Depression Screening:  :         2025     1:04 PM 2025     9:35 AM 2024    10:31 AM 10/25/2024     1:07 PM 2024     8:27 AM 2024     9:03 AM 3/18/2024     8:54 AM   PHQ-9 Questionaire   Little interest or pleasure in doing things 0 0 0 0 3 0 0   Feeling down, depressed, or hopeless 0 0 0 0 2 0 0   Trouble falling or staying asleep, or sleeping too much    0 1     Feeling tired or having little energy    0 1     Poor appetite or overeating    0 1     Feeling bad about yourself - or that you are a failure or have let yourself or your family down    0 2     Trouble concentrating on things, such as reading the newspaper or watching television    0 1     Moving or speaking so slowly that other people could have noticed. Or the opposite - being so fidgety or restless that you have been moving around a lot more than usual    0 1     Thoughts that you would be better off dead, or of hurting yourself in some way    0 1     PHQ-9 Total Score 0 0 0 0 13 0 0   If you checked off any problems, how difficult have these problems made it for you to do your work, take care of things at home, or get along

## 2025-02-06 NOTE — PATIENT INSTRUCTIONS
[facility] Food Resources*  (Call [facility]/ 211 if need more resources.)   SNAP (formerly Food [place])  What they offer: SNAP is used like cash to buy eligible food items from authorized retailers.  Apply for benefits online: https://www.commonhelp.[place].gov/  Apply for benefits by phone: [phone] (M-F 8AM - 7PM; Sat 9AM - 12PM)  [employer] Hunger Hotline  What they offer:  The [employer] Hunger Hotline connects individuals in need of food with a local food pantry or program across [address] [facility] [address] [facility] in [facility].   Website: https://GroupMe/store-/ (search zip code)   Hunger Hotline Inquiry Form: https://GroupMe/hunger-hotline/  Hunger Hotline Phone Number:  804-521-2500 x [phone] (M-F 9:00AM-4:00PM)    Meals on Wheels  Meals on Wheels is a program that delivers meals to individuals who have no reliable means for maintaining a healthy diet.  Services are provided by area.   [employer] Service Area:   [facility], and [place].  [facility], [facility], [facility], [place], and [place]  Website:  https://Side.Cr.org/how-we-help/meals-on-wheels/  To Apply:  Ages 18-59:  Apply online: https://Side.Cr.[employer]/meals-on-wheels-application-form/  Apply by phone: [phone]  To Apply:  Ages 60+:  Apply Online: [email or web address].Cr.[employer]/meals-on-wheels-application-form/  Apply By Phone: [phone] (M-F 8:30AM-5PM)  For [facility], [facility], [place], [place], [place] and [place]: You may also apply by calling [place], [facility] Agency on [phone]  For [facility], and [place] as well as [facility], [facility], [place], [place] and [place]:    Contact [facility] Agency on [phone]    [place] Aging Service Area:   [place], [place], [place], [place],

## 2025-02-06 NOTE — PROGRESS NOTES
[facility] [address] [place] [address]  Phone: [phone]  Fax: [phone]        Chief Complaint   Patient presents with    Follow-up     Print AVS for resource.     Allergic Reaction     Around eyes     She is a 64 y.o. female who presents for acute visit    Presents for continued lower eyelid swelling.  Right is worse.  There is increased tenderness, no drainage.  We discussed allergic conjunctivitis at her 1/2 visit.  Treated at that time with ketotifen drops. No improvement with these.  Saw her eye doctor between then and today's visit. They prescribed tobradex drops. Says she has had no improvement with this.  Says that the swelling is starting to spread to her lower left eyelid now.    Does not remember the name/credentials/location of the eye doctor that she saw.    Prior to Visit Medications    Medication Sig Taking? Authorizing Provider   bacitracin-polymyxin b (POLYSPORIN) [id number] UNIT/GM ophthalmic ointment Place into both eyes 2 times daily for 10 days Every 12 hours. Yes Kiran Tapia MD   valsartan (DIOVAN) 160 MG tablet TAKE 1 TABLET BY MOUTH EVERY DAY Yes Kiran Tapia MD   vitamin D (ERGOCALCIFEROL) 1.25 MG (87309 UT) CAPS capsule Take 1 capsule by mouth Once a week at 5 PM Yes Kiran Tapia MD   ketotifen fumarate (ZADITOR) 0.035 % ophthalmic solution Place 1 drop into the right eye 2 times daily Yes Kiran Tapia MD   cetirizine (ZYRTEC) 10 MG tablet Take 1 tablet by mouth daily Yes Kiran Tapia MD   diclofenac sodium (VOLTAREN) 1 % GEL Apply 4 g topically 4 times daily Yes Kiran Tapia MD   alendronate (FOSAMAX) 70 MG tablet Take 1 tablet by mouth every 7 days Yes Kiran Tapia MD   traZODone (DESYREL) 50 MG tablet Take 1 tablet by mouth nightly Yes Kiran Tapia MD   albuterol sulfate HFA (PROVENTIL;VENTOLIN;PROAIR) 108 (90 Base) MCG/ACT inhaler INHALE TWO PUFFS BY MOUTH EVERY 6 HOURS AS NEEDED FOR WHEEZING Yes Kiran Tapia MD   ibuprofen

## 2025-04-01 ENCOUNTER — OFFICE VISIT (OUTPATIENT)
Age: 65
End: 2025-04-01
Payer: MEDICARE

## 2025-04-01 VITALS
OXYGEN SATURATION: 98 % | HEIGHT: 65 IN | BODY MASS INDEX: 25.83 KG/M2 | HEART RATE: 73 BPM | SYSTOLIC BLOOD PRESSURE: 132 MMHG | RESPIRATION RATE: 16 BRPM | WEIGHT: 155 LBS | TEMPERATURE: 98.1 F | DIASTOLIC BLOOD PRESSURE: 84 MMHG

## 2025-04-01 DIAGNOSIS — M79.671 PAIN OF RIGHT HEEL: Primary | ICD-10-CM

## 2025-04-01 DIAGNOSIS — Z23 ENCOUNTER FOR IMMUNIZATION: ICD-10-CM

## 2025-04-01 PROCEDURE — 1123F ACP DISCUSS/DSCN MKR DOCD: CPT | Performed by: FAMILY MEDICINE

## 2025-04-01 PROCEDURE — 99213 OFFICE O/P EST LOW 20 MIN: CPT | Performed by: FAMILY MEDICINE

## 2025-04-01 PROCEDURE — 3075F SYST BP GE 130 - 139MM HG: CPT | Performed by: FAMILY MEDICINE

## 2025-04-01 PROCEDURE — 90632 HEPA VACCINE ADULT IM: CPT | Performed by: FAMILY MEDICINE

## 2025-04-01 PROCEDURE — 3079F DIAST BP 80-89 MM HG: CPT | Performed by: FAMILY MEDICINE

## 2025-04-01 PROCEDURE — PBSHW HEP A, HAVRIX, (AGE 19 YRS+), IM: Performed by: FAMILY MEDICINE

## 2025-04-01 PROCEDURE — G2211 COMPLEX E/M VISIT ADD ON: HCPCS | Performed by: FAMILY MEDICINE

## 2025-04-01 ASSESSMENT — PATIENT HEALTH QUESTIONNAIRE - PHQ9
SUM OF ALL RESPONSES TO PHQ QUESTIONS 1-9: 6
8. MOVING OR SPEAKING SO SLOWLY THAT OTHER PEOPLE COULD HAVE NOTICED. OR THE OPPOSITE, BEING SO FIGETY OR RESTLESS THAT YOU HAVE BEEN MOVING AROUND A LOT MORE THAN USUAL: NEARLY EVERY DAY
4. FEELING TIRED OR HAVING LITTLE ENERGY: SEVERAL DAYS
5. POOR APPETITE OR OVEREATING: SEVERAL DAYS
1. LITTLE INTEREST OR PLEASURE IN DOING THINGS: SEVERAL DAYS
SUM OF ALL RESPONSES TO PHQ QUESTIONS 1-9: 6
4. FEELING TIRED OR HAVING LITTLE ENERGY: SEVERAL DAYS
7. TROUBLE CONCENTRATING ON THINGS, SUCH AS READING THE NEWSPAPER OR WATCHING TELEVISION: NOT AT ALL
SUM OF ALL RESPONSES TO PHQ QUESTIONS 1-9: 6
6. FEELING BAD ABOUT YOURSELF - OR THAT YOU ARE A FAILURE OR HAVE LET YOURSELF OR YOUR FAMILY DOWN: NOT AT ALL
7. TROUBLE CONCENTRATING ON THINGS, SUCH AS READING THE NEWSPAPER OR WATCHING TELEVISION: NOT AT ALL
1. LITTLE INTEREST OR PLEASURE IN DOING THINGS: SEVERAL DAYS
10. IF YOU CHECKED OFF ANY PROBLEMS, HOW DIFFICULT HAVE THESE PROBLEMS MADE IT FOR YOU TO DO YOUR WORK, TAKE CARE OF THINGS AT HOME, OR GET ALONG WITH OTHER PEOPLE: NOT DIFFICULT AT ALL
8. MOVING OR SPEAKING SO SLOWLY THAT OTHER PEOPLE COULD HAVE NOTICED. OR THE OPPOSITE - BEING SO FIDGETY OR RESTLESS THAT YOU HAVE BEEN MOVING AROUND A LOT MORE THAN USUAL: NEARLY EVERY DAY
9. THOUGHTS THAT YOU WOULD BE BETTER OFF DEAD, OR OF HURTING YOURSELF: NOT AT ALL
SUM OF ALL RESPONSES TO PHQ QUESTIONS 1-9: 6
5. POOR APPETITE OR OVEREATING: SEVERAL DAYS
10. IF YOU CHECKED OFF ANY PROBLEMS, HOW DIFFICULT HAVE THESE PROBLEMS MADE IT FOR YOU TO DO YOUR WORK, TAKE CARE OF THINGS AT HOME, OR GET ALONG WITH OTHER PEOPLE: NOT DIFFICULT AT ALL
SUM OF ALL RESPONSES TO PHQ QUESTIONS 1-9: 6
3. TROUBLE FALLING OR STAYING ASLEEP: NOT AT ALL
6. FEELING BAD ABOUT YOURSELF - OR THAT YOU ARE A FAILURE OR HAVE LET YOURSELF OR YOUR FAMILY DOWN: NOT AT ALL
9. THOUGHTS THAT YOU WOULD BE BETTER OFF DEAD, OR OF HURTING YOURSELF: NOT AT ALL
3. TROUBLE FALLING OR STAYING ASLEEP: NOT AT ALL
2. FEELING DOWN, DEPRESSED OR HOPELESS: NOT AT ALL
2. FEELING DOWN, DEPRESSED OR HOPELESS: NOT AT ALL

## 2025-04-01 NOTE — PROGRESS NOTES
[facility]  [address] [place] [address]  Phone: [phone]  Fax: [phone]        Chief Complaint   Patient presents with    Foot Pain     Right foot.      She is a 64 y.o. female who presents for acute visit.    Has pain in her right heel for the past 1 week.  No injury.  Hurts when she walks on it.  Worse in the morning as as she starts walking, better as she walks frequently/later in the day. Has been doing some dog sitting and has to walk 3 large gonzalez retrievers (all separately) over the past couple of weeks.    Shoes are old, worn.    Prior to Visit Medications    Medication Sig Taking? Authorizing Provider   valsartan (DIOVAN) 160 MG tablet TAKE 1 TABLET BY MOUTH EVERY DAY Yes Kiran Tapia MD   ketotifen fumarate (ZADITOR) 0.035 % ophthalmic solution Place 1 drop into the right eye 2 times daily Yes Kiran Tapia MD   cetirizine (ZYRTEC) 10 MG tablet Take 1 tablet by mouth daily Yes Kiran Tapia MD   diclofenac sodium (VOLTAREN) 1 % GEL Apply 4 g topically 4 times daily Yes Kiran Tapia MD   alendronate (FOSAMAX) 70 MG tablet Take 1 tablet by mouth every 7 days Yes Kiran Tapia MD   traZODone (DESYREL) 50 MG tablet Take 1 tablet by mouth nightly Yes Kiran Tapia MD   albuterol sulfate HFA (PROVENTIL;VENTOLIN;PROAIR) 108 (90 Base) MCG/ACT inhaler INHALE TWO PUFFS BY MOUTH EVERY 6 HOURS AS NEEDED FOR WHEEZING Yes Kiran Tapia MD   ibuprofen (ADVIL;MOTRIN) 600 MG tablet TAKE 1 TABLET BY MOUTH EVERY 8 HOURS AS NEEDED FOR PAIN Yes Kiran Tapia MD   montelukast (SINGULAIR) 10 MG tablet Take 1 tablet by mouth daily Yes Kiran Tapia MD       No Known Allergies      Reviewed PmHx, RxHx, FmHx, SocHx, AllgHx and updated and dated in the chart.      Objective:     Vitals:    04/01/25 0833   BP: 132/84   BP Site: Left Upper Arm   Patient Position: Sitting   BP Cuff Size: Large Adult   Pulse: 73   Resp: 16   Temp: 98.1 °F (36.7 °C)   TempSrc: Temporal   SpO2: 98%   Weight:

## 2025-04-01 NOTE — PROGRESS NOTES
Identified pt with two pt identifiers(name and )    Chief Complaint   Patient presents with    Foot Pain     Right foot.         Health Maintenance Due   Topic    DTaP/Tdap/Td vaccine (1 - Tdap)    Respiratory Syncytial Virus (RSV) Pregnant or age 60 yrs+ (1 - Risk 60-74 years 1-dose series)    Hepatitis A vaccine (2 of 2 - Risk 2-dose series)       Wt Readings from Last 3 Encounters:   25 70.3 kg (155 lb)   25 70.8 kg (156 lb)   25 69.9 kg (154 lb)     Temp Readings from Last 3 Encounters:   25 98.1 °F (36.7 °C) (Temporal)   25 97.9 °F (36.6 °C) (Temporal)   25 97 °F (36.1 °C) (Temporal)     BP Readings from Last 3 Encounters:   25 132/84   25 112/70   25 136/72     Pulse Readings from Last 3 Encounters:   25 73   25 92   25 84           Depression Screening:  :         2025     6:37 AM 2025     1:04 PM 2025     9:35 AM 2024    10:31 AM 10/25/2024     1:07 PM 2024     8:27 AM 2024     9:03 AM   PHQ-9 Questionaire   Little interest or pleasure in doing things 1 0 0 0 0 3 0   Feeling down, depressed, or hopeless 0 0 0 0 0 2 0   Trouble falling or staying asleep, or sleeping too much 0    0 1    Feeling tired or having little energy 1    0 1    Poor appetite or overeating 1    0 1    Feeling bad about yourself - or that you are a failure or have let yourself or your family down 0    0 2    Trouble concentrating on things, such as reading the newspaper or watching television 0    0 1    Moving or speaking so slowly that other people could have noticed. Or the opposite - being so fidgety or restless that you have been moving around a lot more than usual 3    0 1    Thoughts that you would be better off dead, or of hurting yourself in some way 0    0 1    PHQ-9 Total Score 6  0 0 0 0 13 0   If you checked off any problems, how difficult have these problems made it for you to do your work, take care of things at home, or

## 2025-06-04 ENCOUNTER — OFFICE VISIT (OUTPATIENT)
Age: 65
End: 2025-06-04
Payer: MEDICARE

## 2025-06-04 VITALS
HEIGHT: 65 IN | SYSTOLIC BLOOD PRESSURE: 137 MMHG | TEMPERATURE: 97.7 F | WEIGHT: 151 LBS | OXYGEN SATURATION: 97 % | DIASTOLIC BLOOD PRESSURE: 90 MMHG | HEART RATE: 74 BPM | BODY MASS INDEX: 25.16 KG/M2

## 2025-06-04 DIAGNOSIS — Z11.59 ENCOUNTER FOR SCREENING FOR OTHER VIRAL DISEASES: ICD-10-CM

## 2025-06-04 DIAGNOSIS — B18.1 CHRONIC VIRAL HEPATITIS B WITHOUT DELTA AGENT AND WITHOUT COMA (HCC): Primary | ICD-10-CM

## 2025-06-04 DIAGNOSIS — Z72.89 OTHER PROBLEMS RELATED TO LIFESTYLE: ICD-10-CM

## 2025-06-04 PROCEDURE — 99214 OFFICE O/P EST MOD 30 MIN: CPT | Performed by: NURSE PRACTITIONER

## 2025-06-04 PROCEDURE — 1123F ACP DISCUSS/DSCN MKR DOCD: CPT | Performed by: NURSE PRACTITIONER

## 2025-06-04 PROCEDURE — 3075F SYST BP GE 130 - 139MM HG: CPT | Performed by: NURSE PRACTITIONER

## 2025-06-04 PROCEDURE — 3080F DIAST BP >= 90 MM HG: CPT | Performed by: NURSE PRACTITIONER

## 2025-06-04 ASSESSMENT — PATIENT HEALTH QUESTIONNAIRE - PHQ9
SUM OF ALL RESPONSES TO PHQ QUESTIONS 1-9: 0
SUM OF ALL RESPONSES TO PHQ QUESTIONS 1-9: 0
DEPRESSION UNABLE TO ASSESS: FUNCTIONAL CAPACITY MOTIVATION LIMITS ACCURACY
SUM OF ALL RESPONSES TO PHQ QUESTIONS 1-9: 0
1. LITTLE INTEREST OR PLEASURE IN DOING THINGS: NOT AT ALL
SUM OF ALL RESPONSES TO PHQ QUESTIONS 1-9: 0
2. FEELING DOWN, DEPRESSED OR HOPELESS: NOT AT ALL

## 2025-06-04 NOTE — PROGRESS NOTES
[facility]      Santhosh Byers MD, FACP, FACG, FAASLD      MOR LawrenceC    Harriett Matamoros, [facility]   Monique Ratjayme, [facility]   Leah Allen, TOBY-C  Kentrell Krishnan FNP-C   Marichuy Rodriguez, [facility]   at [facility]   [address], [address]   [place]  [address] [phone]   FAX: [phone]  [facility]   [address], [address]   [place]  [address] [phone]   FAX: [phone]     Patient Care Team:  Kiran Tapia MD as PCP - General (Family Medicine)  Kiran Tapia MD as PCP - Empaneled Provider    Patient Active Problem List   Diagnosis    Chronic hepatitis B (HCC)    Arthritis    Hypertension    History of hip replacement    History of total right knee replacement    Liver cyst    Age-related osteoporosis without current pathological fracture    Vitamin D deficiency     Bandar Bennett is being seen at [facility] for management of chronic HBV. The active problem list, all pertinent past medical history, medications, radiologic findings and laboratory findings related to the liver disorder were reviewed and discussed with the patient.      The patient is a 64 y.o.  female who has tested positive for HBV in 1980s.     Risk factors for acquiring HBV are immigration from [facility].      Imaging of the liver was ordered by Dr. Byers 9/2021. The results demonstrate a heterogeneous mildly hyperechoic liver with several large cysts measuring 6.7 x 5.1 x  4.6 cm, 5.4 x 5.2 x 5.3 cm which contains echogenic foci, and 3.3 x 2.6 x 2.4 cm. MRI 3/2022 demonstrated multiple cysts with no concerning characteristics. A CT of Abdomen was performed 9/2022 which again demonstrated cysts but no concerning mass or lesion     The patient has never received treatment for chronic HBV.  HBV

## 2025-06-04 NOTE — PROGRESS NOTES
Chief Complaint   Patient presents with    Follow-up     Vitals:    06/04/25 1055   BP: (!) 137/90   Pulse: 74   Temp: 97.7 °F (36.5 °C)   SpO2: 97%     Have you been to the ER, urgent care clinic since your last visit?  Hospitalized since your last visit?   NO    Have you seen or consulted any other health care providers outside our system since your last visit?   NO

## 2025-06-05 LAB
AFP-TM SERPL-MCNC: 2.7 NG/ML (ref 0–9.2)
ALBUMIN SERPL-MCNC: 4.5 G/DL (ref 3.9–4.9)
ALP SERPL-CCNC: 105 IU/L (ref 44–121)
ALT SERPL-CCNC: 18 IU/L (ref 0–32)
AST SERPL-CCNC: 19 IU/L (ref 0–40)
BASOPHILS # BLD AUTO: 0.1 X10E3/UL (ref 0–0.2)
BASOPHILS NFR BLD AUTO: 1 %
BILIRUB DIRECT SERPL-MCNC: 0.15 MG/DL (ref 0–0.4)
BILIRUB SERPL-MCNC: 0.4 MG/DL (ref 0–1.2)
BUN SERPL-MCNC: 16 MG/DL (ref 8–27)
BUN/CREAT SERPL: 23 (ref 12–28)
CALCIUM SERPL-MCNC: 9.5 MG/DL (ref 8.7–10.3)
CHLORIDE SERPL-SCNC: 104 MMOL/L (ref 96–106)
CO2 SERPL-SCNC: 18 MMOL/L (ref 20–29)
CREAT SERPL-MCNC: 0.7 MG/DL (ref 0.57–1)
EGFRCR SERPLBLD CKD-EPI 2021: 96 ML/MIN/1.73
EOSINOPHIL # BLD AUTO: 0.2 X10E3/UL (ref 0–0.4)
EOSINOPHIL NFR BLD AUTO: 4 %
ERYTHROCYTE [DISTWIDTH] IN BLOOD BY AUTOMATED COUNT: 12.7 % (ref 11.7–15.4)
GLUCOSE SERPL-MCNC: 90 MG/DL (ref 70–99)
HBV DNA SERPL NAA+PROBE-ACNC: NORMAL IU/ML
HBV DNA SERPL NAA+PROBE-LOG IU: NORMAL LOG10 IU/ML
HBV SURFACE AB SER QL: NON REACTIVE
HBV SURFACE AG SERPL QL IA: NEGATIVE
HCT VFR BLD AUTO: 47 % (ref 34–46.6)
HGB BLD-MCNC: 15.5 G/DL (ref 11.1–15.9)
IMM GRANULOCYTES # BLD AUTO: 0 X10E3/UL (ref 0–0.1)
IMM GRANULOCYTES NFR BLD AUTO: 0 %
INR PPP: 0.9 (ref 0.9–1.2)
LYMPHOCYTES # BLD AUTO: 1.8 X10E3/UL (ref 0.7–3.1)
LYMPHOCYTES NFR BLD AUTO: 34 %
MCH RBC QN AUTO: 30 PG (ref 26.6–33)
MCHC RBC AUTO-ENTMCNC: 33 G/DL (ref 31.5–35.7)
MCV RBC AUTO: 91 FL (ref 79–97)
MONOCYTES # BLD AUTO: 0.4 X10E3/UL (ref 0.1–0.9)
MONOCYTES NFR BLD AUTO: 9 %
NEUTROPHILS # BLD AUTO: 2.7 X10E3/UL (ref 1.4–7)
NEUTROPHILS NFR BLD AUTO: 52 %
PLATELET # BLD AUTO: 249 X10E3/UL (ref 150–450)
POTASSIUM SERPL-SCNC: 4.3 MMOL/L (ref 3.5–5.2)
PROT SERPL-MCNC: 7 G/DL (ref 6–8.5)
PROTHROMBIN TIME: 10.4 SEC (ref 9.1–12)
RBC # BLD AUTO: 5.17 X10E6/UL (ref 3.77–5.28)
REF LAB TEST REF RANGE: NORMAL
SODIUM SERPL-SCNC: 140 MMOL/L (ref 134–144)
WBC # BLD AUTO: 5.2 X10E3/UL (ref 3.4–10.8)

## 2025-06-09 ENCOUNTER — ANESTHESIA EVENT (OUTPATIENT)
Facility: HOSPITAL | Age: 65
End: 2025-06-09
Payer: MEDICARE

## 2025-06-09 ENCOUNTER — HOSPITAL ENCOUNTER (OUTPATIENT)
Facility: HOSPITAL | Age: 65
Setting detail: OUTPATIENT SURGERY
Discharge: HOME | End: 2025-06-09
Attending: INTERNAL MEDICINE | Admitting: INTERNAL MEDICINE
Payer: MEDICARE

## 2025-06-09 ENCOUNTER — ANESTHESIA (OUTPATIENT)
Facility: HOSPITAL | Age: 65
End: 2025-06-09
Payer: MEDICARE

## 2025-06-09 ENCOUNTER — RESULTS FOLLOW-UP (OUTPATIENT)
Age: 65
End: 2025-06-09

## 2025-06-09 VITALS
OXYGEN SATURATION: 100 % | WEIGHT: 150.13 LBS | DIASTOLIC BLOOD PRESSURE: 71 MMHG | HEART RATE: 68 BPM | HEIGHT: 65 IN | RESPIRATION RATE: 16 BRPM | SYSTOLIC BLOOD PRESSURE: 107 MMHG | TEMPERATURE: 97.8 F | BODY MASS INDEX: 25.01 KG/M2

## 2025-06-09 PROCEDURE — 6360000002 HC RX W HCPCS: Performed by: NURSE ANESTHETIST, CERTIFIED REGISTERED

## 2025-06-09 PROCEDURE — 7100000011 HC PHASE II RECOVERY - ADDTL 15 MIN: Performed by: INTERNAL MEDICINE

## 2025-06-09 PROCEDURE — 88305 TISSUE EXAM BY PATHOLOGIST: CPT

## 2025-06-09 PROCEDURE — 2709999900 HC NON-CHARGEABLE SUPPLY: Performed by: INTERNAL MEDICINE

## 2025-06-09 PROCEDURE — 7100000010 HC PHASE II RECOVERY - FIRST 15 MIN: Performed by: INTERNAL MEDICINE

## 2025-06-09 PROCEDURE — 3600007512: Performed by: INTERNAL MEDICINE

## 2025-06-09 PROCEDURE — 3600007502: Performed by: INTERNAL MEDICINE

## 2025-06-09 PROCEDURE — 3700000001 HC ADD 15 MINUTES (ANESTHESIA): Performed by: INTERNAL MEDICINE

## 2025-06-09 PROCEDURE — 3700000000 HC ANESTHESIA ATTENDED CARE: Performed by: INTERNAL MEDICINE

## 2025-06-09 RX ORDER — SODIUM CHLORIDE 9 MG/ML
INJECTION, SOLUTION INTRAVENOUS CONTINUOUS
Status: DISCONTINUED | OUTPATIENT
Start: 2025-06-09 | End: 2025-06-09 | Stop reason: HOSPADM

## 2025-06-09 RX ORDER — PROPOFOL 10 MG/ML
INJECTION, EMULSION INTRAVENOUS
Status: DISCONTINUED | OUTPATIENT
Start: 2025-06-09 | End: 2025-06-09 | Stop reason: SDUPTHER

## 2025-06-09 RX ADMIN — PROPOFOL 70 MG: 10 INJECTION, EMULSION INTRAVENOUS at 13:48

## 2025-06-09 RX ADMIN — PROPOFOL 50 MG: 10 INJECTION, EMULSION INTRAVENOUS at 13:52

## 2025-06-09 RX ADMIN — PROPOFOL 120 MCG/KG/MIN: 10 INJECTION, EMULSION INTRAVENOUS at 13:49

## 2025-06-09 ASSESSMENT — LIFESTYLE VARIABLES: SMOKING_STATUS: 0

## 2025-06-09 ASSESSMENT — PAIN - FUNCTIONAL ASSESSMENT: PAIN_FUNCTIONAL_ASSESSMENT: 0-10

## 2025-06-09 NOTE — DISCHARGE INSTRUCTIONS
[facility]  Brendan Rich M.D.  [phone]          COLON DISCHARGE INSTRUCTIONS       2025    Bandar Bennett  :  1960  [facility] Medical Record Number:  [id number]  COLONOSCOPY FINDINGS:  Your colonoscopy showed two polyps that were removed and sent to pathology, one polyp was about 10 mm in size, the other polyp about 2 mm in size, otherwise normal mucosa.    DISCOMFORT:  Redness at IV site- apply warm compress to area; if redness or soreness persist- contact your physician  There may be a slight amount of blood passed from the rectum  Gaseous discomfort- walking, belching will help relieve any discomfort  You may not operate a vehicle for 12 hours  You may not engage in an occupation involving machinery or appliances for rest of today  You may not drink alcoholic beverages for at least 12 hours  Avoid making any critical decisions for at least 24 hour  DIET:   High fiber diet   - however -  remember your colon is empty and a heavy meal will produce gas.   Avoid these foods:  vegetables, fried / greasy foods, carbonated drinks for today     ACTIVITY:  You may resume your normal daily activities it is recommended that you spend the remainder of the day resting -  avoid any strenuous activity.    CALL M.D.  ANY SIGN OF:   Increasing pain, nausea, vomiting  Abdominal distension (swelling)  New increased bleeding (oral or rectal)  Fever (chills)  Pain in chest area  Bloody discharge from nose or mouth   Shortness of breath    Follow-up Instructions:   Call Dr. Rich if any questions or problems.   Telephone # [phone]  Biopsy results will be available in  5 to 7 days  Likely repeat colonoscopy in 3 years.

## 2025-06-09 NOTE — ANESTHESIA PRE PROCEDURE
SURGERY Right     COLONOSCOPY N/A 1/21/2020    COLONOSCOPY performed by Brendan Rich MD at [facility] ENDOSCOPY    JOINT REPLACEMENT      TOTAL KNEE ARTHROPLASTY Right        Social History:    Social History     Tobacco Use    Smoking status: Never    Smokeless tobacco: Never   Substance Use Topics    Alcohol use: Never                                Counseling given: Not Answered      Vital Signs (Current): There were no vitals filed for this visit.                                           BP Readings from Last 3 Encounters:   06/04/25 (!) 137/90   04/01/25 132/84   02/06/25 112/70       NPO Status:                                                                                 BMI:   Wt Readings from Last 3 Encounters:   06/04/25 68.5 kg (151 lb)   04/01/25 70.3 kg (155 lb)   02/06/25 70.8 kg (156 lb)     There is no height or weight on file to calculate BMI.    CBC:   Lab Results   Component Value Date/Time    WBC 5.2 06/04/2025 12:00 AM    RBC 5.17 06/04/2025 12:00 AM    HGB 15.5 06/04/2025 12:00 AM    HCT 47.0 06/04/2025 12:00 AM    MCV 91 06/04/2025 12:00 AM    RDW 12.7 06/04/2025 12:00 AM     06/04/2025 12:00 AM       CMP:   Lab Results   Component Value Date/Time     06/04/2025 12:00 AM    K 4.3 06/04/2025 12:00 AM     06/04/2025 12:00 AM    CO2 18 06/04/2025 12:00 AM    BUN 16 06/04/2025 12:00 AM    CREATININE 0.70 06/04/2025 12:00 AM    GFRAA >60 09/21/2021 10:21 AM    AGRATIO 1.2 09/21/2021 10:21 AM    LABGLOM 96 06/04/2025 12:00 AM    LABGLOM >60 03/18/2024 09:47 AM    LABGLOM 95 09/19/2023 09:29 AM    GLUCOSE 90 06/04/2025 12:00 AM    CALCIUM 9.5 06/04/2025 12:00 AM    BILITOT 0.4 06/04/2025 12:00 AM    ALKPHOS 105 06/04/2025 12:00 AM    AST 19 06/04/2025 12:00 AM    ALT 18 06/04/2025 12:00 AM       POC Tests: No results for input(s): \"POCGLU\", \"POCNA\", \"POCK\", \"POCCL\", \"POCBUN\", \"POCHEMO\", \"POCHCT\" in the last 72 hours.    Coags:   Lab Results   Component Value Date/Time    PROTIME 10.4

## 2025-06-09 NOTE — OP NOTE
None.     EBL:  None.    Impression:  Two polyps removed and sent to pathology, otherwise mucosa within normal.    Recommendations:  -If adenoma is present, repeat colonoscopy in 3 years.   -High fiber diet.    -Resume current medication(s)    Discharge Disposition:  Home in the company of a  when able to ambulate.    Brendan Rich MD  6/9/2025  2:11 PM

## 2025-06-09 NOTE — H&P
\"INR\", \"APTT\" in the last 72 hours.    Invalid input(s): \"PTP\"    Patient Active Problem List   Diagnosis    Chronic hepatitis B (HCC)    Arthritis    Hypertension    History of hip replacement    History of total right knee replacement    Liver cyst    Age-related osteoporosis without current pathological fracture    Vitamin D deficiency      Assessment:     Colon cancer screening and colon polyp surveillance   Plan:     Colonoscopy today.     Signed By: Brendan Rich MD     6/9/2025  1:46 PM

## 2025-06-09 NOTE — ANESTHESIA POSTPROCEDURE EVALUATION
Department of Anesthesiology  Postprocedure Note    Patient: Bandar Bennett  MRN: [id number]  YOB: 1960  Date of evaluation: 6/9/2025    Procedure Summary       Date: 06/09/25 Room / Location: [facility] ENDO 03 / [facility] ENDOSCOPY    Anesthesia Start: 1344 Anesthesia Stop: 1410    Procedures:       COLONOSCOPY BIOPSY (Lower GI Region)      COLONOSCOPY POLYPECTOMY SNARE/BIOPSY (Lower GI Region) Diagnosis:       Hx of colonic polyps      (Hx of colonic polyps [Z86.0100])    Surgeons: Brendan Rich MD Responsible Provider: Amrit Brooks MD    Anesthesia Type: MAC ASA Status: 2            Anesthesia Type: No value filed.    Swati Phase I: Swati Score: 10    Swati Phase II: Swati Score: 9    Anesthesia Post Evaluation    Patient location during evaluation: PACU  Patient participation: complete - patient participated  Level of consciousness: awake  Pain score: 0  Airway patency: patent  Nausea & Vomiting: no nausea and no vomiting  Cardiovascular status: blood pressure returned to baseline  Respiratory status: acceptable  Hydration status: euvolemic  Pain management: adequate    No notable events documented.

## 2025-06-10 ENCOUNTER — HOSPITAL ENCOUNTER (OUTPATIENT)
Facility: HOSPITAL | Age: 65
Discharge: HOME | End: 2025-06-13
Payer: MEDICARE

## 2025-06-10 DIAGNOSIS — B18.1 CHRONIC VIRAL HEPATITIS B WITHOUT DELTA AGENT AND WITHOUT COMA (HCC): ICD-10-CM

## 2025-06-10 PROCEDURE — 76700 US EXAM ABDOM COMPLETE: CPT

## 2025-06-12 DIAGNOSIS — F43.21 ADJUSTMENT DISORDER WITH DEPRESSED MOOD: ICD-10-CM

## 2025-06-12 DIAGNOSIS — G47.00 INSOMNIA, UNSPECIFIED TYPE: ICD-10-CM

## 2025-06-12 RX ORDER — TRAZODONE HYDROCHLORIDE 50 MG/1
50 TABLET ORAL NIGHTLY
Qty: 90 TABLET | Refills: 1 | Status: SHIPPED | OUTPATIENT
Start: 2025-06-12

## 2025-06-19 DIAGNOSIS — M81.0 OSTEOPOROSIS, UNSPECIFIED OSTEOPOROSIS TYPE, UNSPECIFIED PATHOLOGICAL FRACTURE PRESENCE: ICD-10-CM

## 2025-06-19 RX ORDER — ALENDRONATE SODIUM 70 MG/1
TABLET ORAL
Qty: 12 TABLET | Refills: 1 | Status: SHIPPED | OUTPATIENT
Start: 2025-06-19

## 2025-07-02 ASSESSMENT — PATIENT HEALTH QUESTIONNAIRE - PHQ9
4. FEELING TIRED OR HAVING LITTLE ENERGY: NOT AT ALL
8. MOVING OR SPEAKING SO SLOWLY THAT OTHER PEOPLE COULD HAVE NOTICED. OR THE OPPOSITE - BEING SO FIDGETY OR RESTLESS THAT YOU HAVE BEEN MOVING AROUND A LOT MORE THAN USUAL: NOT AT ALL
SUM OF ALL RESPONSES TO PHQ QUESTIONS 1-9: 1
8. MOVING OR SPEAKING SO SLOWLY THAT OTHER PEOPLE COULD HAVE NOTICED. OR THE OPPOSITE, BEING SO FIGETY OR RESTLESS THAT YOU HAVE BEEN MOVING AROUND A LOT MORE THAN USUAL: NOT AT ALL
9. THOUGHTS THAT YOU WOULD BE BETTER OFF DEAD, OR OF HURTING YOURSELF: NOT AT ALL
7. TROUBLE CONCENTRATING ON THINGS, SUCH AS READING THE NEWSPAPER OR WATCHING TELEVISION: NOT AT ALL
1. LITTLE INTEREST OR PLEASURE IN DOING THINGS: SEVERAL DAYS
10. IF YOU CHECKED OFF ANY PROBLEMS, HOW DIFFICULT HAVE THESE PROBLEMS MADE IT FOR YOU TO DO YOUR WORK, TAKE CARE OF THINGS AT HOME, OR GET ALONG WITH OTHER PEOPLE: NOT DIFFICULT AT ALL
9. THOUGHTS THAT YOU WOULD BE BETTER OFF DEAD, OR OF HURTING YOURSELF: NOT AT ALL
1. LITTLE INTEREST OR PLEASURE IN DOING THINGS: SEVERAL DAYS
3. TROUBLE FALLING OR STAYING ASLEEP: NOT AT ALL
SUM OF ALL RESPONSES TO PHQ QUESTIONS 1-9: 1
2. FEELING DOWN, DEPRESSED OR HOPELESS: NOT AT ALL
10. IF YOU CHECKED OFF ANY PROBLEMS, HOW DIFFICULT HAVE THESE PROBLEMS MADE IT FOR YOU TO DO YOUR WORK, TAKE CARE OF THINGS AT HOME, OR GET ALONG WITH OTHER PEOPLE: NOT DIFFICULT AT ALL
5. POOR APPETITE OR OVEREATING: NOT AT ALL
SUM OF ALL RESPONSES TO PHQ QUESTIONS 1-9: 1
3. TROUBLE FALLING OR STAYING ASLEEP: NOT AT ALL
6. FEELING BAD ABOUT YOURSELF - OR THAT YOU ARE A FAILURE OR HAVE LET YOURSELF OR YOUR FAMILY DOWN: NOT AT ALL
2. FEELING DOWN, DEPRESSED OR HOPELESS: NOT AT ALL
SUM OF ALL RESPONSES TO PHQ QUESTIONS 1-9: 1
6. FEELING BAD ABOUT YOURSELF - OR THAT YOU ARE A FAILURE OR HAVE LET YOURSELF OR YOUR FAMILY DOWN: NOT AT ALL
SUM OF ALL RESPONSES TO PHQ QUESTIONS 1-9: 1
4. FEELING TIRED OR HAVING LITTLE ENERGY: NOT AT ALL
5. POOR APPETITE OR OVEREATING: NOT AT ALL
7. TROUBLE CONCENTRATING ON THINGS, SUCH AS READING THE NEWSPAPER OR WATCHING TELEVISION: NOT AT ALL

## 2025-07-03 ENCOUNTER — OFFICE VISIT (OUTPATIENT)
Age: 65
End: 2025-07-03
Payer: MEDICARE

## 2025-07-03 ENCOUNTER — LAB (OUTPATIENT)
Age: 65
End: 2025-07-03
Payer: MEDICARE

## 2025-07-03 VITALS
DIASTOLIC BLOOD PRESSURE: 70 MMHG | WEIGHT: 150 LBS | OXYGEN SATURATION: 99 % | BODY MASS INDEX: 24.99 KG/M2 | HEIGHT: 65 IN | SYSTOLIC BLOOD PRESSURE: 132 MMHG | TEMPERATURE: 97.9 F | RESPIRATION RATE: 16 BRPM | HEART RATE: 84 BPM

## 2025-07-03 DIAGNOSIS — I10 PRIMARY HYPERTENSION: ICD-10-CM

## 2025-07-03 DIAGNOSIS — E78.5 HYPERLIPIDEMIA, UNSPECIFIED HYPERLIPIDEMIA TYPE: ICD-10-CM

## 2025-07-03 DIAGNOSIS — G47.00 INSOMNIA, UNSPECIFIED TYPE: ICD-10-CM

## 2025-07-03 DIAGNOSIS — I10 PRIMARY HYPERTENSION: Primary | ICD-10-CM

## 2025-07-03 DIAGNOSIS — M81.0 AGE-RELATED OSTEOPOROSIS WITHOUT CURRENT PATHOLOGICAL FRACTURE: ICD-10-CM

## 2025-07-03 DIAGNOSIS — B18.1 CHRONIC HEPATITIS B (HCC): ICD-10-CM

## 2025-07-03 DIAGNOSIS — L30.9 DERMATITIS OF LIP: ICD-10-CM

## 2025-07-03 DIAGNOSIS — J30.2 SEASONAL ALLERGIC RHINITIS, UNSPECIFIED TRIGGER: ICD-10-CM

## 2025-07-03 PROCEDURE — 1123F ACP DISCUSS/DSCN MKR DOCD: CPT | Performed by: FAMILY MEDICINE

## 2025-07-03 PROCEDURE — 99214 OFFICE O/P EST MOD 30 MIN: CPT | Performed by: FAMILY MEDICINE

## 2025-07-03 PROCEDURE — 3075F SYST BP GE 130 - 139MM HG: CPT | Performed by: FAMILY MEDICINE

## 2025-07-03 PROCEDURE — G2211 COMPLEX E/M VISIT ADD ON: HCPCS | Performed by: FAMILY MEDICINE

## 2025-07-03 PROCEDURE — 3078F DIAST BP <80 MM HG: CPT | Performed by: FAMILY MEDICINE

## 2025-07-03 RX ORDER — LEVOCETIRIZINE DIHYDROCHLORIDE 5 MG/1
5 TABLET, FILM COATED ORAL NIGHTLY
Qty: 30 TABLET | Refills: 2 | Status: SHIPPED | OUTPATIENT
Start: 2025-07-03

## 2025-07-03 RX ORDER — HYDROCORTISONE 25 MG/G
OINTMENT TOPICAL
Qty: 30 G | Refills: 1 | Status: SHIPPED | OUTPATIENT
Start: 2025-07-03 | End: 2025-07-10

## 2025-07-03 NOTE — PROGRESS NOTES
[facility]  [address] [place] [address]  Phone: [phone]  Fax: [phone]        Chief Complaint   Patient presents with    Follow-up     Chronics and she would like to have labs.     Oral Swelling     Pt complains of stinging on her right side of upper lip.      She is a 64 y.o. female who presents for follow up visit.    Presents for HTN follow up.  Taking medications as prescribed and reports no side effects.  Denies chest pain, headache, visual disturbances.  Does not check BP at home.    She is complaining increased burning, swelling of her right upper lip.  Has been going on for 2 months. Vaseline not helping.  Says area feels dry.    I have been following for periorbital swelling in the past.  Says that she saw the eye doctor and was given drops that help.    No significant allergy history.    Admits that her sleep is not as good as it should be.  Trazodone is helping some. Makes her too drowsy in the mornings after she takes it.    Prior to Visit Medications    Medication Sig Taking? Authorizing Provider   hydrocortisone 2.5 % ointment Apply topically 2 times daily. Yes Kiran Tapia MD   levocetirizine (XYZAL ALLERGY 24HR) 5 MG tablet Take 1 tablet by mouth nightly Yes Kiran Tapia MD   alendronate (FOSAMAX) 70 MG tablet TAKE 1 TABLET BY MOUTH ONE TIME PER WEEK Yes Kiran Tapia MD   traZODone (DESYREL) 50 MG tablet TAKE 1 TABLET BY MOUTH EVERY DAY AT NIGHT Yes Kiran Tapia MD   valsartan (DIOVAN) 160 MG tablet TAKE 1 TABLET BY MOUTH EVERY DAY Yes Kiran Tapia MD   ibuprofen (ADVIL;MOTRIN) 600 MG tablet TAKE 1 TABLET BY MOUTH EVERY 8 HOURS AS NEEDED FOR PAIN Yes Kiran Tapia MD       No Known Allergies      Reviewed PmHx, RxHx, FmHx, SocHx, AllgHx and updated and dated in the chart.      Objective:     Vitals:    07/03/25 0926   BP: 132/70   Pulse: 84   Resp: 16   Temp: 97.9 °F (36.6 °C)   TempSrc: Temporal   SpO2: 99%   Weight: 68 kg (150 lb)   Height: 1.651 m

## 2025-07-03 NOTE — PROGRESS NOTES
Identified pt with two pt identifiers(name and )    Chief Complaint   Patient presents with    Follow-up     Chronics and she would like to have labs.     Oral Swelling     Pt complains of stinging on her right side of upper lip.         Health Maintenance Due   Topic    DTaP/Tdap/Td vaccine (1 - Tdap)    Respiratory Syncytial Virus (RSV) Pregnant or age 60 yrs+ (1 - Risk 60-74 years 1-dose series)    COVID-19 Vaccine ( season)       Wt Readings from Last 3 Encounters:   25 68 kg (150 lb)   25 68.1 kg (150 lb 2.1 oz)   25 68.5 kg (151 lb)     Temp Readings from Last 3 Encounters:   25 97.9 °F (36.6 °C) (Temporal)   25 97.8 °F (36.6 °C)   25 97.7 °F (36.5 °C)     BP Readings from Last 3 Encounters:   25 132/70   25 107/71   25 (!) 137/90     Pulse Readings from Last 3 Encounters:   25 84   25 68   25 74           Depression Screening:  :         2025     3:21 PM 2025    10:58 AM 2025     6:37 AM 2025     1:04 PM 2025     9:35 AM 2024    10:31 AM 10/25/2024     1:07 PM   PHQ-9 Questionaire   Little interest or pleasure in doing things 1 0 1 0 0 0 0   Feeling down, depressed, or hopeless 0 0 0 0 0 0 0   Trouble falling or staying asleep, or sleeping too much 0  0    0   Feeling tired or having little energy 0  1    0   Poor appetite or overeating 0  1    0   Feeling bad about yourself - or that you are a failure or have let yourself or your family down 0  0    0   Trouble concentrating on things, such as reading the newspaper or watching television 0  0    0   Moving or speaking so slowly that other people could have noticed. Or the opposite - being so fidgety or restless that you have been moving around a lot more than usual 0  3    0   Thoughts that you would be better off dead, or of hurting yourself in some way 0  0    0   PHQ-9 Total Score 1  0 6  0 0 0 0   If you checked off any problems, how difficult

## 2025-07-04 LAB
ALBUMIN SERPL-MCNC: 4.5 G/DL (ref 3.9–4.9)
ALP SERPL-CCNC: 87 IU/L (ref 44–121)
ALT SERPL-CCNC: 20 IU/L (ref 0–32)
AST SERPL-CCNC: 20 IU/L (ref 0–40)
BASOPHILS # BLD AUTO: 0.1 X10E3/UL (ref 0–0.2)
BASOPHILS NFR BLD AUTO: 1 %
BILIRUB SERPL-MCNC: 0.6 MG/DL (ref 0–1.2)
BUN SERPL-MCNC: 17 MG/DL (ref 8–27)
BUN/CREAT SERPL: 27 (ref 12–28)
CALCIUM SERPL-MCNC: 9.3 MG/DL (ref 8.7–10.3)
CHLORIDE SERPL-SCNC: 103 MMOL/L (ref 96–106)
CHOLEST SERPL-MCNC: 219 MG/DL (ref 100–199)
CO2 SERPL-SCNC: 25 MMOL/L (ref 20–29)
CREAT SERPL-MCNC: 0.64 MG/DL (ref 0.57–1)
EGFRCR SERPLBLD CKD-EPI 2021: 98 ML/MIN/1.73
EOSINOPHIL # BLD AUTO: 0.3 X10E3/UL (ref 0–0.4)
EOSINOPHIL NFR BLD AUTO: 5 %
ERYTHROCYTE [DISTWIDTH] IN BLOOD BY AUTOMATED COUNT: 12.6 % (ref 11.7–15.4)
GLOBULIN SER CALC-MCNC: 2.5 G/DL (ref 1.5–4.5)
GLUCOSE SERPL-MCNC: 92 MG/DL (ref 70–99)
HCT VFR BLD AUTO: 47 % (ref 34–46.6)
HDLC SERPL-MCNC: 38 MG/DL
HGB BLD-MCNC: 15.3 G/DL (ref 11.1–15.9)
IMM GRANULOCYTES # BLD AUTO: 0 X10E3/UL (ref 0–0.1)
IMM GRANULOCYTES NFR BLD AUTO: 0 %
IMP & REVIEW OF LAB RESULTS: NORMAL
LDLC SERPL CALC-MCNC: 142 MG/DL (ref 0–99)
LYMPHOCYTES # BLD AUTO: 2.1 X10E3/UL (ref 0.7–3.1)
LYMPHOCYTES NFR BLD AUTO: 37 %
MCH RBC QN AUTO: 30.5 PG (ref 26.6–33)
MCHC RBC AUTO-ENTMCNC: 32.6 G/DL (ref 31.5–35.7)
MCV RBC AUTO: 94 FL (ref 79–97)
MONOCYTES # BLD AUTO: 0.5 X10E3/UL (ref 0.1–0.9)
MONOCYTES NFR BLD AUTO: 10 %
NEUTROPHILS # BLD AUTO: 2.6 X10E3/UL (ref 1.4–7)
NEUTROPHILS NFR BLD AUTO: 47 %
PLATELET # BLD AUTO: 242 X10E3/UL (ref 150–450)
POTASSIUM SERPL-SCNC: 4.7 MMOL/L (ref 3.5–5.2)
PROT SERPL-MCNC: 7 G/DL (ref 6–8.5)
RBC # BLD AUTO: 5.01 X10E6/UL (ref 3.77–5.28)
SODIUM SERPL-SCNC: 141 MMOL/L (ref 134–144)
SPECIMEN STATUS REPORT: NORMAL
TRIGL SERPL-MCNC: 215 MG/DL (ref 0–149)
VLDLC SERPL CALC-MCNC: 39 MG/DL (ref 5–40)
WBC # BLD AUTO: 5.5 X10E3/UL (ref 3.4–10.8)

## 2025-07-06 ENCOUNTER — RESULTS FOLLOW-UP (OUTPATIENT)
Age: 65
End: 2025-07-06

## 2025-07-06 DIAGNOSIS — M81.0 OSTEOPOROSIS, UNSPECIFIED OSTEOPOROSIS TYPE, UNSPECIFIED PATHOLOGICAL FRACTURE PRESENCE: Primary | ICD-10-CM

## 2025-07-06 LAB — 25(OH)D3+25(OH)D2 SERPL-MCNC: 29.8 NG/ML (ref 30–100)

## 2025-07-06 RX ORDER — ERGOCALCIFEROL 1.25 MG/1
50000 CAPSULE, LIQUID FILLED ORAL WEEKLY
Qty: 12 CAPSULE | Refills: 1 | Status: SHIPPED | OUTPATIENT
Start: 2025-07-06 | End: 2025-07-07

## 2025-07-07 DIAGNOSIS — M81.0 OSTEOPOROSIS, UNSPECIFIED OSTEOPOROSIS TYPE, UNSPECIFIED PATHOLOGICAL FRACTURE PRESENCE: ICD-10-CM

## 2025-07-07 RX ORDER — ERGOCALCIFEROL 1.25 MG/1
50000 CAPSULE, LIQUID FILLED ORAL WEEKLY
Qty: 12 CAPSULE | Refills: 1 | Status: SHIPPED | OUTPATIENT
Start: 2025-07-07

## 2025-07-13 DIAGNOSIS — I10 PRIMARY HYPERTENSION: ICD-10-CM

## 2025-07-14 RX ORDER — VALSARTAN 160 MG/1
160 TABLET ORAL DAILY
Qty: 90 TABLET | Refills: 1 | Status: SHIPPED | OUTPATIENT
Start: 2025-07-14

## 2025-07-24 ENCOUNTER — TELEPHONE (OUTPATIENT)
Age: 65
End: 2025-07-24

## 2025-07-24 NOTE — TELEPHONE ENCOUNTER
LVM for Pt stating that we do accept [employer] but for them to be sure they would need to contact their insurance company

## 2025-07-24 NOTE — TELEPHONE ENCOUNTER
----- Message from SHAHRAM GÓMEZ MA sent at 7/23/2025  4:00 PM EDT -----  Regarding: FW: ECC Message to Provider  Please help her with insurance info. Thank you  ----- Message -----  From: Fabi Harley MA  Sent: 7/23/2025   1:53 PM EDT  To: Krysten Rea MA  Subject: FW: ECC Message to Provider                        ----- Message -----  From: Christy Diaz  Sent: 7/23/2025  11:28 AM EDT  To: [facility] Clinical Staff  Subject: ECC Message to Provider                          ECC Message to Provider    Relationship to Patient: Self     Additional Information : Pt is confirming if the practice or her pcp will accept the insurance [employer] PPO. Insurance number - [id number]  --------------------------------------------------------------------------------------------------------------------------    Call Back Information: OK to leave message on voicemail  Preferred Call Back Number: Phone tel:[phone]

## (undated) DEVICE — 3M™ CUROS™ DISINFECTING CAP FOR NEEDLELESS CONNECTORS 270/CARTON 20 CARTONS/CASE CFF1-270: Brand: CUROS™

## (undated) DEVICE — ELECTRODE,RADIOTRANSLUCENT,FOAM,3PK: Brand: MEDLINE

## (undated) DEVICE — BAG BELONG PT PERS CLEAR HANDL

## (undated) DEVICE — BAG SPEC BIOHZRD 10 X 10 IN --

## (undated) DEVICE — Device

## (undated) DEVICE — SIMPLICITY FLUFF UNDERPAD 23X36, MODERATE: Brand: SIMPLICITY

## (undated) DEVICE — CANN NASAL O2 CAPNOGRAPHY AD -- FILTERLINE

## (undated) DEVICE — 1200 GUARD II KIT W/5MM TUBE W/O VAC TUBE: Brand: GUARDIAN

## (undated) DEVICE — SYR 5ML 1/5 GRAD LL NSAF LF --

## (undated) DEVICE — SET ADMIN 16ML TBNG L100IN 2 Y INJ SITE IV PIGGY BK DISP

## (undated) DEVICE — CATH IV AUTOGRD BC PNK 20GA 25 -- INSYTE

## (undated) DEVICE — SOLIDIFIER MEDC 1200ML -- CONVERT TO 356117

## (undated) DEVICE — SNARE ENDOSCP DIA9MM SHTH DIA2.4MM CLD FOR POLYP EXACTO

## (undated) DEVICE — FORCEPS BX L240CM JAW DIA2.8MM L CAP W/ NDL MIC MESH TOOTH

## (undated) DEVICE — KIT COLON W/ 1.1OZ LUB AND 2 END